# Patient Record
Sex: FEMALE | Race: WHITE | Employment: OTHER | URBAN - METROPOLITAN AREA
[De-identification: names, ages, dates, MRNs, and addresses within clinical notes are randomized per-mention and may not be internally consistent; named-entity substitution may affect disease eponyms.]

---

## 2017-01-12 ENCOUNTER — TELEPHONE (OUTPATIENT)
Dept: INTERNAL MEDICINE CLINIC | Age: 57
End: 2017-01-12

## 2017-01-12 DIAGNOSIS — G89.4 CHRONIC PAIN SYNDROME: ICD-10-CM

## 2017-01-12 RX ORDER — HYDROCODONE BITARTRATE AND ACETAMINOPHEN 10; 325 MG/1; MG/1
1 TABLET ORAL 3 TIMES DAILY PRN
Qty: 90 TABLET | Refills: 0 | Status: SHIPPED | OUTPATIENT
Start: 2017-01-12 | End: 2017-02-15 | Stop reason: SDUPTHER

## 2017-02-15 ENCOUNTER — OFFICE VISIT (OUTPATIENT)
Dept: INTERNAL MEDICINE CLINIC | Age: 57
End: 2017-02-15

## 2017-02-15 VITALS
BODY MASS INDEX: 23.84 KG/M2 | TEMPERATURE: 98.1 F | DIASTOLIC BLOOD PRESSURE: 74 MMHG | SYSTOLIC BLOOD PRESSURE: 118 MMHG | HEART RATE: 68 BPM | WEIGHT: 127.2 LBS | RESPIRATION RATE: 16 BRPM

## 2017-02-15 DIAGNOSIS — M51.16 LUMBAR DISC DISEASE WITH RADICULOPATHY: Primary | ICD-10-CM

## 2017-02-15 DIAGNOSIS — I10 ESSENTIAL HYPERTENSION: ICD-10-CM

## 2017-02-15 DIAGNOSIS — G89.4 CHRONIC PAIN SYNDROME: ICD-10-CM

## 2017-02-15 DIAGNOSIS — F17.200 SMOKER: ICD-10-CM

## 2017-02-15 PROCEDURE — 99213 OFFICE O/P EST LOW 20 MIN: CPT | Performed by: INTERNAL MEDICINE

## 2017-02-15 RX ORDER — HYDROCODONE BITARTRATE AND ACETAMINOPHEN 10; 325 MG/1; MG/1
1 TABLET ORAL 3 TIMES DAILY PRN
Qty: 90 TABLET | Refills: 0 | Status: SHIPPED | OUTPATIENT
Start: 2017-02-15 | End: 2017-03-14 | Stop reason: SDUPTHER

## 2017-02-18 ASSESSMENT — ENCOUNTER SYMPTOMS
SHORTNESS OF BREATH: 0
CONSTIPATION: 1
COUGH: 0
WHEEZING: 0
DIARRHEA: 0
CHEST TIGHTNESS: 0
VOMITING: 0
BACK PAIN: 1
ABDOMINAL PAIN: 0
NAUSEA: 0

## 2017-03-14 ENCOUNTER — TELEPHONE (OUTPATIENT)
Dept: INTERNAL MEDICINE CLINIC | Age: 57
End: 2017-03-14

## 2017-03-14 DIAGNOSIS — G89.4 CHRONIC PAIN SYNDROME: ICD-10-CM

## 2017-03-14 RX ORDER — HYDROCODONE BITARTRATE AND ACETAMINOPHEN 10; 325 MG/1; MG/1
1 TABLET ORAL 3 TIMES DAILY PRN
Qty: 90 TABLET | Refills: 0 | Status: SHIPPED | OUTPATIENT
Start: 2017-03-14 | End: 2017-04-13 | Stop reason: SDUPTHER

## 2017-04-13 DIAGNOSIS — G89.4 CHRONIC PAIN SYNDROME: ICD-10-CM

## 2017-04-13 RX ORDER — HYDROCODONE BITARTRATE AND ACETAMINOPHEN 10; 325 MG/1; MG/1
1 TABLET ORAL 3 TIMES DAILY PRN
Qty: 90 TABLET | Refills: 0 | Status: SHIPPED | OUTPATIENT
Start: 2017-04-13 | End: 2017-05-15 | Stop reason: SDUPTHER

## 2017-05-15 ENCOUNTER — OFFICE VISIT (OUTPATIENT)
Dept: INTERNAL MEDICINE CLINIC | Age: 57
End: 2017-05-15

## 2017-05-15 VITALS
SYSTOLIC BLOOD PRESSURE: 154 MMHG | BODY MASS INDEX: 24.08 KG/M2 | TEMPERATURE: 97.7 F | DIASTOLIC BLOOD PRESSURE: 84 MMHG | WEIGHT: 128.5 LBS | HEART RATE: 88 BPM

## 2017-05-15 DIAGNOSIS — G89.4 CHRONIC PAIN SYNDROME: ICD-10-CM

## 2017-05-15 DIAGNOSIS — N39.0 URINARY TRACT INFECTION, SITE UNSPECIFIED: ICD-10-CM

## 2017-05-15 DIAGNOSIS — I10 ESSENTIAL HYPERTENSION: ICD-10-CM

## 2017-05-15 DIAGNOSIS — D22.9 ATYPICAL MOLE: ICD-10-CM

## 2017-05-15 DIAGNOSIS — F17.200 SMOKER: ICD-10-CM

## 2017-05-15 DIAGNOSIS — N20.0 KIDNEY STONE: Primary | ICD-10-CM

## 2017-05-15 LAB
BILIRUBIN, POC: NORMAL
BLOOD URINE, POC: NORMAL
CLARITY, POC: CLEAR
COLOR, POC: NORMAL
GLUCOSE URINE, POC: NORMAL
KETONES, POC: NORMAL
LEUKOCYTE EST, POC: NORMAL
NITRITE, POC: NORMAL
PH, POC: 7
PROTEIN, POC: NORMAL
SPECIFIC GRAVITY, POC: 1.01
UROBILINOGEN, POC: 0.2

## 2017-05-15 PROCEDURE — 3014F SCREEN MAMMO DOC REV: CPT | Performed by: INTERNAL MEDICINE

## 2017-05-15 PROCEDURE — G8427 DOCREV CUR MEDS BY ELIG CLIN: HCPCS | Performed by: INTERNAL MEDICINE

## 2017-05-15 PROCEDURE — 81002 URINALYSIS NONAUTO W/O SCOPE: CPT | Performed by: INTERNAL MEDICINE

## 2017-05-15 PROCEDURE — 3017F COLORECTAL CA SCREEN DOC REV: CPT | Performed by: INTERNAL MEDICINE

## 2017-05-15 PROCEDURE — G8420 CALC BMI NORM PARAMETERS: HCPCS | Performed by: INTERNAL MEDICINE

## 2017-05-15 PROCEDURE — 99214 OFFICE O/P EST MOD 30 MIN: CPT | Performed by: INTERNAL MEDICINE

## 2017-05-15 PROCEDURE — 4004F PT TOBACCO SCREEN RCVD TLK: CPT | Performed by: INTERNAL MEDICINE

## 2017-05-15 RX ORDER — VARENICLINE TARTRATE 25 MG
KIT ORAL
Qty: 1 EACH | Refills: 0 | Status: SHIPPED | OUTPATIENT
Start: 2017-05-15 | End: 2017-09-11 | Stop reason: SINTOL

## 2017-05-15 RX ORDER — CEFUROXIME AXETIL 250 MG/1
250 TABLET ORAL 2 TIMES DAILY WITH MEALS
Qty: 14 TABLET | Refills: 0 | Status: SHIPPED | OUTPATIENT
Start: 2017-05-15 | End: 2017-08-03 | Stop reason: ALTCHOICE

## 2017-05-15 RX ORDER — LISINOPRIL 20 MG/1
20 TABLET ORAL DAILY
Qty: 30 TABLET | Refills: 0
Start: 2017-05-15 | End: 2017-08-03 | Stop reason: DRUGHIGH

## 2017-05-15 RX ORDER — HYDROCODONE BITARTRATE AND ACETAMINOPHEN 10; 325 MG/1; MG/1
1 TABLET ORAL 3 TIMES DAILY PRN
Qty: 90 TABLET | Refills: 0 | Status: SHIPPED | OUTPATIENT
Start: 2017-05-15 | End: 2017-06-14 | Stop reason: SDUPTHER

## 2017-05-16 ENCOUNTER — TELEPHONE (OUTPATIENT)
Dept: INTERNAL MEDICINE CLINIC | Age: 57
End: 2017-05-16

## 2017-05-16 DIAGNOSIS — N20.0 KIDNEY STONE: Primary | ICD-10-CM

## 2017-05-17 LAB — URINE CULTURE, ROUTINE: NORMAL

## 2017-05-19 ASSESSMENT — ENCOUNTER SYMPTOMS
BACK PAIN: 1
NAUSEA: 0
SHORTNESS OF BREATH: 0
CONSTIPATION: 1
DIARRHEA: 0
VOMITING: 0
WHEEZING: 0
COUGH: 0
ABDOMINAL PAIN: 0
CHEST TIGHTNESS: 0

## 2017-06-14 ENCOUNTER — TELEPHONE (OUTPATIENT)
Dept: INTERNAL MEDICINE CLINIC | Age: 57
End: 2017-06-14

## 2017-06-14 DIAGNOSIS — G89.4 CHRONIC PAIN SYNDROME: ICD-10-CM

## 2017-06-14 RX ORDER — HYDROCODONE BITARTRATE AND ACETAMINOPHEN 10; 325 MG/1; MG/1
1 TABLET ORAL 3 TIMES DAILY PRN
Qty: 90 TABLET | Refills: 0 | Status: SHIPPED | OUTPATIENT
Start: 2017-06-14 | End: 2017-07-13 | Stop reason: SDUPTHER

## 2017-06-22 ENCOUNTER — TELEPHONE (OUTPATIENT)
Dept: INTERNAL MEDICINE CLINIC | Age: 57
End: 2017-06-22

## 2017-06-22 DIAGNOSIS — N30.00 ACUTE CYSTITIS WITHOUT HEMATURIA: Primary | ICD-10-CM

## 2017-07-11 DIAGNOSIS — G89.4 CHRONIC PAIN SYNDROME: ICD-10-CM

## 2017-07-13 RX ORDER — HYDROCODONE BITARTRATE AND ACETAMINOPHEN 10; 325 MG/1; MG/1
1 TABLET ORAL 3 TIMES DAILY PRN
Qty: 90 TABLET | Refills: 0 | Status: SHIPPED | OUTPATIENT
Start: 2017-07-13 | End: 2017-08-03 | Stop reason: SDUPTHER

## 2017-08-03 ENCOUNTER — OFFICE VISIT (OUTPATIENT)
Dept: INTERNAL MEDICINE CLINIC | Age: 57
End: 2017-08-03

## 2017-08-03 VITALS
TEMPERATURE: 97.8 F | WEIGHT: 126.6 LBS | DIASTOLIC BLOOD PRESSURE: 88 MMHG | BODY MASS INDEX: 23.9 KG/M2 | HEIGHT: 61 IN | SYSTOLIC BLOOD PRESSURE: 150 MMHG | HEART RATE: 87 BPM

## 2017-08-03 DIAGNOSIS — M54.31 SCIATICA OF RIGHT SIDE: ICD-10-CM

## 2017-08-03 DIAGNOSIS — G89.4 CHRONIC PAIN SYNDROME: ICD-10-CM

## 2017-08-03 DIAGNOSIS — I10 ESSENTIAL HYPERTENSION: Primary | ICD-10-CM

## 2017-08-03 DIAGNOSIS — J44.9 CHRONIC OBSTRUCTIVE PULMONARY DISEASE, UNSPECIFIED COPD TYPE (HCC): ICD-10-CM

## 2017-08-03 DIAGNOSIS — F17.219 NICOTINE DEPENDENCE, CIGARETTES, WITH UNSPECIFIED NICOTINE-INDUCED DISORDERS: ICD-10-CM

## 2017-08-03 DIAGNOSIS — F41.9 ANXIETY: ICD-10-CM

## 2017-08-03 PROCEDURE — G8420 CALC BMI NORM PARAMETERS: HCPCS | Performed by: INTERNAL MEDICINE

## 2017-08-03 PROCEDURE — 3017F COLORECTAL CA SCREEN DOC REV: CPT | Performed by: INTERNAL MEDICINE

## 2017-08-03 PROCEDURE — G0296 VISIT TO DETERM LDCT ELIG: HCPCS | Performed by: INTERNAL MEDICINE

## 2017-08-03 PROCEDURE — 3014F SCREEN MAMMO DOC REV: CPT | Performed by: INTERNAL MEDICINE

## 2017-08-03 PROCEDURE — 3023F SPIROM DOC REV: CPT | Performed by: INTERNAL MEDICINE

## 2017-08-03 PROCEDURE — G8427 DOCREV CUR MEDS BY ELIG CLIN: HCPCS | Performed by: INTERNAL MEDICINE

## 2017-08-03 PROCEDURE — 99214 OFFICE O/P EST MOD 30 MIN: CPT | Performed by: INTERNAL MEDICINE

## 2017-08-03 PROCEDURE — G8926 SPIRO NO PERF OR DOC: HCPCS | Performed by: INTERNAL MEDICINE

## 2017-08-03 PROCEDURE — 4004F PT TOBACCO SCREEN RCVD TLK: CPT | Performed by: INTERNAL MEDICINE

## 2017-08-03 RX ORDER — LISINOPRIL 40 MG/1
40 TABLET ORAL DAILY
Qty: 30 TABLET | Refills: 3 | Status: SHIPPED | OUTPATIENT
Start: 2017-08-03 | End: 2017-12-11 | Stop reason: SDUPTHER

## 2017-08-03 RX ORDER — HYDROCODONE BITARTRATE AND ACETAMINOPHEN 10; 325 MG/1; MG/1
1 TABLET ORAL 3 TIMES DAILY PRN
Qty: 90 TABLET | Refills: 0 | Status: SHIPPED | OUTPATIENT
Start: 2017-08-03 | End: 2017-09-11 | Stop reason: SDUPTHER

## 2017-08-06 ASSESSMENT — ENCOUNTER SYMPTOMS
NAUSEA: 0
ABDOMINAL PAIN: 0
WHEEZING: 0
VOMITING: 0
COUGH: 1
DIARRHEA: 0
CONSTIPATION: 1
BACK PAIN: 1
CHEST TIGHTNESS: 0
SHORTNESS OF BREATH: 1

## 2017-09-11 ENCOUNTER — OFFICE VISIT (OUTPATIENT)
Dept: INTERNAL MEDICINE CLINIC | Age: 57
End: 2017-09-11

## 2017-09-11 VITALS
HEART RATE: 72 BPM | TEMPERATURE: 98.1 F | WEIGHT: 124.4 LBS | BODY MASS INDEX: 23.49 KG/M2 | SYSTOLIC BLOOD PRESSURE: 108 MMHG | HEIGHT: 61 IN | RESPIRATION RATE: 16 BRPM | DIASTOLIC BLOOD PRESSURE: 64 MMHG

## 2017-09-11 DIAGNOSIS — I10 ESSENTIAL HYPERTENSION: ICD-10-CM

## 2017-09-11 DIAGNOSIS — M54.31 SCIATICA OF RIGHT SIDE: ICD-10-CM

## 2017-09-11 DIAGNOSIS — Z12.4 SCREENING FOR CERVICAL CANCER: ICD-10-CM

## 2017-09-11 DIAGNOSIS — M25.551 RIGHT HIP PAIN: ICD-10-CM

## 2017-09-11 DIAGNOSIS — F17.200 SMOKER: ICD-10-CM

## 2017-09-11 DIAGNOSIS — F33.40 RECURRENT MAJOR DEPRESSIVE DISORDER, IN REMISSION (HCC): ICD-10-CM

## 2017-09-11 DIAGNOSIS — G89.4 CHRONIC PAIN SYNDROME: ICD-10-CM

## 2017-09-11 DIAGNOSIS — Z00.00 PHYSICAL EXAM, ANNUAL: Primary | ICD-10-CM

## 2017-09-11 PROCEDURE — 99396 PREV VISIT EST AGE 40-64: CPT | Performed by: INTERNAL MEDICINE

## 2017-09-11 RX ORDER — HYDROCODONE BITARTRATE AND ACETAMINOPHEN 10; 325 MG/1; MG/1
1 TABLET ORAL 3 TIMES DAILY PRN
Qty: 90 TABLET | Refills: 0 | Status: SHIPPED | OUTPATIENT
Start: 2017-09-11 | End: 2017-10-12 | Stop reason: SDUPTHER

## 2017-09-12 ASSESSMENT — ENCOUNTER SYMPTOMS
NAUSEA: 0
VOMITING: 0
COUGH: 0
CHEST TIGHTNESS: 0
TROUBLE SWALLOWING: 0
SHORTNESS OF BREATH: 0
ABDOMINAL PAIN: 0
SORE THROAT: 0
CONSTIPATION: 0
BACK PAIN: 1
DIARRHEA: 0

## 2017-09-13 RX ORDER — METRONIDAZOLE 500 MG/1
500 TABLET ORAL 3 TIMES DAILY
Qty: 30 TABLET | Refills: 0 | Status: SHIPPED | OUTPATIENT
Start: 2017-09-13 | End: 2017-09-19 | Stop reason: SINTOL

## 2017-09-15 LAB
CHOLESTEROL, TOTAL: 192 MG/DL
CHOLESTEROL, TOTAL: 192 MG/DL
CHOLESTEROL/HDL RATIO: 4
CHOLESTEROL/HDL RATIO: 4
HDLC SERPL-MCNC: 44 MG/DL (ref 35–70)
HDLC SERPL-MCNC: 44 MG/DL (ref 35–70)
LDL CHOLESTEROL CALCULATED: 122 MG/DL (ref 0–160)
LDL CHOLESTEROL CALCULATED: 122 MG/DL (ref 0–160)
TRIGL SERPL-MCNC: 126 MG/DL
TRIGL SERPL-MCNC: 126 MG/DL
VLDLC SERPL CALC-MCNC: 25 MG/DL
VLDLC SERPL CALC-MCNC: 25 MG/DL

## 2017-09-19 ENCOUNTER — TELEPHONE (OUTPATIENT)
Dept: INTERNAL MEDICINE CLINIC | Age: 57
End: 2017-09-19

## 2017-09-19 RX ORDER — CLINDAMYCIN HYDROCHLORIDE 300 MG/1
300 CAPSULE ORAL 2 TIMES DAILY
Qty: 14 CAPSULE | Refills: 0 | Status: SHIPPED | OUTPATIENT
Start: 2017-09-19 | End: 2017-09-26

## 2017-09-22 ENCOUNTER — TELEPHONE (OUTPATIENT)
Dept: INTERNAL MEDICINE CLINIC | Age: 57
End: 2017-09-22

## 2017-09-22 DIAGNOSIS — N30.00 ACUTE CYSTITIS WITHOUT HEMATURIA: Primary | ICD-10-CM

## 2017-09-22 RX ORDER — NITROFURANTOIN 25; 75 MG/1; MG/1
100 CAPSULE ORAL 2 TIMES DAILY
Qty: 14 CAPSULE | Refills: 0 | Status: SHIPPED | OUTPATIENT
Start: 2017-09-22 | End: 2017-09-29

## 2017-10-12 ENCOUNTER — TELEPHONE (OUTPATIENT)
Dept: INTERNAL MEDICINE CLINIC | Age: 57
End: 2017-10-12

## 2017-10-12 DIAGNOSIS — G89.4 CHRONIC PAIN SYNDROME: ICD-10-CM

## 2017-10-12 RX ORDER — HYDROCODONE BITARTRATE AND ACETAMINOPHEN 10; 325 MG/1; MG/1
1 TABLET ORAL 3 TIMES DAILY PRN
Qty: 90 TABLET | Refills: 0 | Status: SHIPPED | OUTPATIENT
Start: 2017-10-12 | End: 2017-11-27 | Stop reason: SDUPTHER

## 2017-11-27 ENCOUNTER — TELEPHONE (OUTPATIENT)
Dept: INTERNAL MEDICINE CLINIC | Age: 57
End: 2017-11-27

## 2017-11-27 DIAGNOSIS — G89.4 CHRONIC PAIN SYNDROME: ICD-10-CM

## 2017-11-27 RX ORDER — HYDROCODONE BITARTRATE AND ACETAMINOPHEN 10; 325 MG/1; MG/1
1 TABLET ORAL 3 TIMES DAILY PRN
Qty: 90 TABLET | Refills: 0 | Status: SHIPPED | OUTPATIENT
Start: 2017-11-27 | End: 2018-01-03 | Stop reason: SDUPTHER

## 2017-12-11 ENCOUNTER — OFFICE VISIT (OUTPATIENT)
Dept: INTERNAL MEDICINE CLINIC | Age: 57
End: 2017-12-11

## 2017-12-11 VITALS
WEIGHT: 126 LBS | SYSTOLIC BLOOD PRESSURE: 110 MMHG | HEIGHT: 61 IN | HEART RATE: 92 BPM | DIASTOLIC BLOOD PRESSURE: 70 MMHG | TEMPERATURE: 98.1 F | BODY MASS INDEX: 23.79 KG/M2 | OXYGEN SATURATION: 99 %

## 2017-12-11 DIAGNOSIS — R90.82 WHITE MATTER DISEASE: ICD-10-CM

## 2017-12-11 DIAGNOSIS — F17.200 SMOKER: ICD-10-CM

## 2017-12-11 DIAGNOSIS — G89.4 CHRONIC PAIN SYNDROME: ICD-10-CM

## 2017-12-11 DIAGNOSIS — F41.9 ANXIETY: ICD-10-CM

## 2017-12-11 DIAGNOSIS — R25.1 OCCASIONAL TREMORS: ICD-10-CM

## 2017-12-11 DIAGNOSIS — I10 ESSENTIAL HYPERTENSION: ICD-10-CM

## 2017-12-11 DIAGNOSIS — M54.31 SCIATICA OF RIGHT SIDE: ICD-10-CM

## 2017-12-11 DIAGNOSIS — H53.9 VISION CHANGES: Primary | ICD-10-CM

## 2017-12-11 DIAGNOSIS — L98.9 LESION OF FINGER: ICD-10-CM

## 2017-12-11 PROCEDURE — G8420 CALC BMI NORM PARAMETERS: HCPCS | Performed by: INTERNAL MEDICINE

## 2017-12-11 PROCEDURE — G8484 FLU IMMUNIZE NO ADMIN: HCPCS | Performed by: INTERNAL MEDICINE

## 2017-12-11 PROCEDURE — 99214 OFFICE O/P EST MOD 30 MIN: CPT | Performed by: INTERNAL MEDICINE

## 2017-12-11 PROCEDURE — G8427 DOCREV CUR MEDS BY ELIG CLIN: HCPCS | Performed by: INTERNAL MEDICINE

## 2017-12-11 PROCEDURE — 4004F PT TOBACCO SCREEN RCVD TLK: CPT | Performed by: INTERNAL MEDICINE

## 2017-12-11 PROCEDURE — 3014F SCREEN MAMMO DOC REV: CPT | Performed by: INTERNAL MEDICINE

## 2017-12-11 PROCEDURE — 3017F COLORECTAL CA SCREEN DOC REV: CPT | Performed by: INTERNAL MEDICINE

## 2017-12-11 RX ORDER — NICOTINE 21 MG/24HR
1 PATCH, TRANSDERMAL 24 HOURS TRANSDERMAL EVERY 24 HOURS
Qty: 30 PATCH | Refills: 0 | Status: SHIPPED | OUTPATIENT
Start: 2017-12-11 | End: 2018-06-13

## 2017-12-11 RX ORDER — LISINOPRIL 40 MG/1
40 TABLET ORAL DAILY
Qty: 90 TABLET | Refills: 1 | Status: SHIPPED | OUTPATIENT
Start: 2017-12-11 | End: 2018-08-01 | Stop reason: SDUPTHER

## 2017-12-11 RX ORDER — POLYETHYLENE GLYCOL 3350 17 G
2 POWDER IN PACKET (EA) ORAL EVERY 4 HOURS PRN
Qty: 100 EACH | Refills: 3 | Status: SHIPPED | OUTPATIENT
Start: 2017-12-11 | End: 2018-06-13

## 2017-12-11 ASSESSMENT — ENCOUNTER SYMPTOMS: BACK PAIN: 1

## 2017-12-11 NOTE — PATIENT INSTRUCTIONS
Please call your pharmacy if you need any refills of your medication(s). Please call our office at [phone] if you don't hear from us about your test results. Bring an accurate list of your medications with you at every appointment to ensure that we have the correct information.     Our office hours are: Monday - Friday 8:30 am- 5 pm    Phone lines turn on at 8:30 am

## 2017-12-11 NOTE — PROGRESS NOTES
SUBJECTIVE:  Sergio Quinonez is a 65 y.o. female being evaluated for:    Chief Complaint   Patient presents with    3 Month Follow-Up     medication refill, HTN, Chronic pain, has been having some visional problems concerned about whit brain matter       HPI   Patient having visual disturbances again. Described as looking through a fog. MRI in past with white matter changes. MIld jerking      Index finger on right hand with lesion there for 3 weeks  Started after deer hunting. Sore using neosporin. Will not heal  Arthritic in the area      Chronic pan right low back into groin. Numbness in vaginal area worseining. TO have surgery but unable to stop smoking     Depression doing better     Allergies   Allergen Reactions    Avelox [Moxifloxacin] Swelling and Rash    Chantix [Varenicline]      irritability and worsening depression     Codeine Nausea Only    Sulfa Antibiotics Swelling and Rash     Current Outpatient Prescriptions   Medication Sig Dispense Refill    nicotine (NICODERM CQ) 21 MG/24HR Place 1 patch onto the skin every 24 hours 30 patch 0    nicotine polacrilex (COMMIT) 2 MG lozenge Take 1 lozenge by mouth every 4 hours as needed for Smoking cessation 100 each 3    sertraline (ZOLOFT) 50 MG tablet Take 1 tablet by mouth daily 90 tablet 1    lisinopril (PRINIVIL;ZESTRIL) 40 MG tablet Take 1 tablet by mouth daily 90 tablet 1    HYDROcodone-acetaminophen (NORCO)  MG per tablet Take 1 tablet by mouth 3 times daily as needed for Pain . 90 tablet 0    umeclidinium-vilanterol (ANORO ELLIPTA) 62.5-25 MCG/INH AEPB inhaler Inhale 1 puff into the lungs daily 1 each 5     No current facility-administered medications for this visit. Social History     Social History    Marital status:      Spouse name: N/A    Number of children: N/A    Years of education: N/A     Occupational History    Not on file.      Social History Main Topics    Smoking status: Current Every Day Smoker Packs/day: 1.00     Years: 34.00     Types: Cigarettes    Smokeless tobacco: Never Used    Alcohol use No    Drug use: No    Sexual activity: Not on file     Other Topics Concern    Not on file     Social History Narrative    No narrative on file      Past Medical History:   Diagnosis Date    Anxiety     Chronic back pain     Chronic pain     Depression     GERD (gastroesophageal reflux disease)     Lumbar disc disease     Migraine     Osteoarthritis     Sciatica of right side     Tobacco abuse     Unspecified essential hypertension      Past Surgical History:   Procedure Laterality Date    APPENDECTOMY      KNEE SURGERY Left     SHOULDER SURGERY Left        Review of Systems   Constitutional: Positive for activity change. Negative for fatigue. HENT: Negative for nosebleeds and sore throat. Eyes: Positive for visual disturbance. Respiratory: Negative for cough and shortness of breath. Cardiovascular: Negative for chest pain, palpitations and leg swelling. Gastrointestinal: Negative for abdominal pain, constipation, diarrhea, nausea and vomiting. Genitourinary: Negative for dysuria, vaginal discharge and vaginal pain. Musculoskeletal: Positive for arthralgias (hip ), back pain and gait problem. Skin:        Nodule on distal PIP joint index finger right hand   Neurological: Positive for tremors. Negative for dizziness, light-headedness and headaches. Paraesthesias down right leg    Psychiatric/Behavioral: Positive for sleep disturbance. Negative for dysphoric mood and suicidal ideas. The patient is nervous/anxious. OBJECTIVE:  /70 (Site: Left Arm, Position: Sitting, Cuff Size: Medium Adult)   Pulse 92   Temp 98.1 °F (36.7 °C) (Oral)   Ht 5' 1.25\" (1.556 m)   Wt 126 lb (57.2 kg)   LMP  (Approximate)   SpO2 99%   BMI 23.61 kg/m²      Body mass index is 23.61 kg/m².      Physical Exam    ASSESSMENT/PLAN:    Apurva Zaidi was seen today for 3 month Bring an accurate list of your medications with you at every appointment to ensure that we have the correct information.     Our office hours are: Monday - Friday 8:30 am- 5 pm    Phone lines turn on at 8:30 am

## 2017-12-13 ASSESSMENT — ENCOUNTER SYMPTOMS
NAUSEA: 0
VOMITING: 0
DIARRHEA: 0
COUGH: 0
CONSTIPATION: 0
ABDOMINAL PAIN: 0
SORE THROAT: 0
SHORTNESS OF BREATH: 0

## 2018-01-03 ENCOUNTER — OFFICE VISIT (OUTPATIENT)
Dept: INTERNAL MEDICINE CLINIC | Age: 58
End: 2018-01-03

## 2018-01-03 VITALS
OXYGEN SATURATION: 93 % | HEART RATE: 80 BPM | SYSTOLIC BLOOD PRESSURE: 112 MMHG | WEIGHT: 128 LBS | TEMPERATURE: 97.9 F | DIASTOLIC BLOOD PRESSURE: 70 MMHG | BODY MASS INDEX: 24.17 KG/M2 | HEIGHT: 61 IN

## 2018-01-03 DIAGNOSIS — G89.4 CHRONIC PAIN SYNDROME: ICD-10-CM

## 2018-01-03 DIAGNOSIS — J01.10 ACUTE NON-RECURRENT FRONTAL SINUSITIS: Primary | ICD-10-CM

## 2018-01-03 PROCEDURE — G8427 DOCREV CUR MEDS BY ELIG CLIN: HCPCS | Performed by: INTERNAL MEDICINE

## 2018-01-03 PROCEDURE — 4004F PT TOBACCO SCREEN RCVD TLK: CPT | Performed by: INTERNAL MEDICINE

## 2018-01-03 PROCEDURE — G8420 CALC BMI NORM PARAMETERS: HCPCS | Performed by: INTERNAL MEDICINE

## 2018-01-03 PROCEDURE — 3014F SCREEN MAMMO DOC REV: CPT | Performed by: INTERNAL MEDICINE

## 2018-01-03 PROCEDURE — 99213 OFFICE O/P EST LOW 20 MIN: CPT | Performed by: INTERNAL MEDICINE

## 2018-01-03 PROCEDURE — 3017F COLORECTAL CA SCREEN DOC REV: CPT | Performed by: INTERNAL MEDICINE

## 2018-01-03 PROCEDURE — G8484 FLU IMMUNIZE NO ADMIN: HCPCS | Performed by: INTERNAL MEDICINE

## 2018-01-03 RX ORDER — PREDNISONE 20 MG/1
20 TABLET ORAL DAILY
Qty: 10 TABLET | Refills: 0 | Status: SHIPPED | OUTPATIENT
Start: 2018-01-03 | End: 2018-01-13

## 2018-01-03 RX ORDER — AMOXICILLIN AND CLAVULANATE POTASSIUM 875; 125 MG/1; MG/1
1 TABLET, FILM COATED ORAL 2 TIMES DAILY
Qty: 20 TABLET | Refills: 0 | Status: SHIPPED | OUTPATIENT
Start: 2018-01-03 | End: 2018-01-13

## 2018-01-03 RX ORDER — HYDROCODONE BITARTRATE AND ACETAMINOPHEN 10; 325 MG/1; MG/1
1 TABLET ORAL 3 TIMES DAILY PRN
Qty: 90 TABLET | Refills: 0 | Status: SHIPPED | OUTPATIENT
Start: 2018-01-03 | End: 2018-02-12 | Stop reason: SDUPTHER

## 2018-01-03 NOTE — PROGRESS NOTES
SUBJECTIVE:  Shad Nicholas is a 65 y.o. female being evaluated for:    Chief Complaint   Patient presents with    Sinusitis     feeling bad for 3 weeks and getting worse, head and chest congestion    Cough       HPI   Sick for 3 weeks. Head congestion. Nasal drainage is green. Terrible headache between and above her eyes. No ear pain. DIzzy with blowing her nose. COuhging Not productive but hears rattling  No wheezing  No SOB  No fevers or chills.  with similar symptoms Smoking 1/2 pack a day. Needs to stop smoking to have spine surgeyr   Allergies   Allergen Reactions    Avelox [Moxifloxacin] Swelling and Rash    Chantix [Varenicline]      irritability and worsening depression     Codeine Nausea Only    Sulfa Antibiotics Swelling and Rash     Current Outpatient Prescriptions   Medication Sig Dispense Refill    nicotine (NICODERM CQ) 21 MG/24HR Place 1 patch onto the skin every 24 hours 30 patch 0    nicotine polacrilex (COMMIT) 2 MG lozenge Take 1 lozenge by mouth every 4 hours as needed for Smoking cessation 100 each 3    sertraline (ZOLOFT) 50 MG tablet Take 1 tablet by mouth daily 90 tablet 1    lisinopril (PRINIVIL;ZESTRIL) 40 MG tablet Take 1 tablet by mouth daily 90 tablet 1    HYDROcodone-acetaminophen (NORCO)  MG per tablet Take 1 tablet by mouth 3 times daily as needed for Pain . 90 tablet 0    umeclidinium-vilanterol (ANORO ELLIPTA) 62.5-25 MCG/INH AEPB inhaler Inhale 1 puff into the lungs daily 1 each 5     No current facility-administered medications for this visit. Social History     Social History    Marital status:      Spouse name: N/A    Number of children: N/A    Years of education: N/A     Occupational History    Not on file.      Social History Main Topics    Smoking status: Current Every Day Smoker     Packs/day: 1.00     Years: 34.00     Types: Cigarettes    Smokeless tobacco: Never Used    Alcohol use No    Drug use: No    Sexual activity: Not on file     Other Topics Concern    Not on file     Social History Narrative    No narrative on file      Past Medical History:   Diagnosis Date    Anxiety     Chronic back pain     Chronic pain     Depression     GERD (gastroesophageal reflux disease)     Lumbar disc disease     Migraine     Osteoarthritis     Sciatica of right side     Tobacco abuse     Unspecified essential hypertension      Past Surgical History:   Procedure Laterality Date    APPENDECTOMY      KNEE SURGERY Left     SHOULDER SURGERY Left        Review of Systems    OBJECTIVE:  /70   Pulse 80   Temp 97.9 °F (36.6 °C) (Oral)   Ht 5' 1.25\" (1.556 m)   Wt 128 lb (58.1 kg)   LMP  (Approximate)   SpO2 93%   BMI 23.99 kg/m²      Body mass index is 23.99 kg/m². Physical Exam    ASSESSMENT/PLAN:    There are no diagnoses linked to this encounter. No orders of the defined types were placed in this encounter. No Follow-up on file. Patient Instructions   Please call your pharmacy if you need any refills of your medication(s). Please call our office at [phone] if you don't hear from us about your test results. Bring an accurate list of your medications with you at every appointment to ensure that we have the correct information.     Our office hours are: Monday - Friday 8:30 am- 5 pm    Phone lines turn on at 8:30 am

## 2018-01-16 ENCOUNTER — TELEPHONE (OUTPATIENT)
Dept: INTERNAL MEDICINE CLINIC | Age: 58
End: 2018-01-16

## 2018-01-16 RX ORDER — AMOXICILLIN 875 MG/1
875 TABLET, COATED ORAL 2 TIMES DAILY
Qty: 20 TABLET | Refills: 0 | Status: SHIPPED | OUTPATIENT
Start: 2018-01-16 | End: 2018-01-18 | Stop reason: SDUPTHER

## 2018-01-16 RX ORDER — METHYLPREDNISOLONE 4 MG/1
TABLET ORAL
Qty: 1 KIT | Refills: 0 | Status: SHIPPED | OUTPATIENT
Start: 2018-01-16 | End: 2018-01-18 | Stop reason: SDUPTHER

## 2018-01-18 ENCOUNTER — TELEPHONE (OUTPATIENT)
Dept: INTERNAL MEDICINE CLINIC | Age: 58
End: 2018-01-18

## 2018-01-18 RX ORDER — METHYLPREDNISOLONE 4 MG/1
TABLET ORAL
Qty: 1 KIT | Refills: 0 | Status: SHIPPED | OUTPATIENT
Start: 2018-01-18 | End: 2018-01-24

## 2018-01-18 RX ORDER — AMOXICILLIN 875 MG/1
875 TABLET, COATED ORAL 2 TIMES DAILY
Qty: 20 TABLET | Refills: 0 | Status: SHIPPED | OUTPATIENT
Start: 2018-01-18 | End: 2018-03-13

## 2018-02-12 ENCOUNTER — TELEPHONE (OUTPATIENT)
Dept: INTERNAL MEDICINE CLINIC | Age: 58
End: 2018-02-12

## 2018-02-12 DIAGNOSIS — G89.4 CHRONIC PAIN SYNDROME: ICD-10-CM

## 2018-02-12 RX ORDER — HYDROCODONE BITARTRATE AND ACETAMINOPHEN 10; 325 MG/1; MG/1
1 TABLET ORAL 3 TIMES DAILY PRN
Qty: 90 TABLET | Refills: 0 | Status: SHIPPED | OUTPATIENT
Start: 2018-02-12 | End: 2018-03-13 | Stop reason: DRUGHIGH

## 2018-03-13 ENCOUNTER — OFFICE VISIT (OUTPATIENT)
Dept: INTERNAL MEDICINE CLINIC | Age: 58
End: 2018-03-13

## 2018-03-13 VITALS
WEIGHT: 129 LBS | HEART RATE: 90 BPM | HEIGHT: 61 IN | DIASTOLIC BLOOD PRESSURE: 70 MMHG | TEMPERATURE: 97.9 F | OXYGEN SATURATION: 96 % | BODY MASS INDEX: 24.35 KG/M2 | SYSTOLIC BLOOD PRESSURE: 110 MMHG

## 2018-03-13 DIAGNOSIS — J44.9 CHRONIC OBSTRUCTIVE PULMONARY DISEASE, UNSPECIFIED COPD TYPE (HCC): ICD-10-CM

## 2018-03-13 DIAGNOSIS — M51.16 LUMBAR DISC DISEASE WITH RADICULOPATHY: ICD-10-CM

## 2018-03-13 DIAGNOSIS — C43.71 MALIGNANT MELANOMA OF RIGHT LOWER EXTREMITY INCLUDING HIP (HCC): ICD-10-CM

## 2018-03-13 DIAGNOSIS — G89.4 CHRONIC PAIN SYNDROME: Primary | ICD-10-CM

## 2018-03-13 DIAGNOSIS — F17.200 SMOKER: ICD-10-CM

## 2018-03-13 DIAGNOSIS — A49.02 MRSA (METHICILLIN RESISTANT STAPHYLOCOCCUS AUREUS) INFECTION: ICD-10-CM

## 2018-03-13 DIAGNOSIS — I10 ESSENTIAL HYPERTENSION: ICD-10-CM

## 2018-03-13 PROCEDURE — 99214 OFFICE O/P EST MOD 30 MIN: CPT | Performed by: INTERNAL MEDICINE

## 2018-03-13 RX ORDER — ALBUTEROL SULFATE 90 UG/1
2 AEROSOL, METERED RESPIRATORY (INHALATION) EVERY 6 HOURS PRN
Qty: 1 INHALER | Refills: 3 | Status: SHIPPED | OUTPATIENT
Start: 2018-03-13 | End: 2020-01-28 | Stop reason: SDUPTHER

## 2018-03-13 RX ORDER — HYDROCODONE BITARTRATE AND ACETAMINOPHEN 7.5; 325 MG/1; MG/1
1 TABLET ORAL EVERY 6 HOURS PRN
Qty: 90 TABLET | Refills: 0 | Status: SHIPPED | OUTPATIENT
Start: 2018-03-13 | End: 2018-04-16 | Stop reason: SDUPTHER

## 2018-03-13 RX ORDER — BUDESONIDE AND FORMOTEROL FUMARATE DIHYDRATE 160; 4.5 UG/1; UG/1
2 AEROSOL RESPIRATORY (INHALATION) 2 TIMES DAILY
Qty: 1 INHALER | Refills: 5 | Status: SHIPPED | OUTPATIENT
Start: 2018-03-13 | End: 2019-05-09 | Stop reason: SDUPTHER

## 2018-03-13 ASSESSMENT — PATIENT HEALTH QUESTIONNAIRE - PHQ9
SUM OF ALL RESPONSES TO PHQ QUESTIONS 1-9: 0
2. FEELING DOWN, DEPRESSED OR HOPELESS: 0
1. LITTLE INTEREST OR PLEASURE IN DOING THINGS: 0
SUM OF ALL RESPONSES TO PHQ9 QUESTIONS 1 & 2: 0

## 2018-03-13 NOTE — PROGRESS NOTES
Years: 34.00     Types: Cigarettes    Smokeless tobacco: Never Used    Alcohol use No    Drug use: No    Sexual activity: Not on file     Other Topics Concern    Not on file     Social History Narrative    No narrative on file      Past Medical History:   Diagnosis Date    Anxiety     Chronic back pain     Chronic pain     Depression     GERD (gastroesophageal reflux disease)     Lumbar disc disease     Migraine     Osteoarthritis     Sciatica of right side     Tobacco abuse     Unspecified essential hypertension      Past Surgical History:   Procedure Laterality Date    APPENDECTOMY      KNEE SURGERY Left     SHOULDER SURGERY Left        Review of Systems   Constitutional: Negative for fatigue. HENT: Negative for nosebleeds. Eyes: Positive for visual disturbance. Respiratory: Negative for cough and shortness of breath. Cardiovascular: Negative for chest pain, palpitations and leg swelling. Gastrointestinal: Negative for abdominal pain, constipation, diarrhea, nausea and vomiting. Musculoskeletal: Positive for arthralgias (hip ), back pain and gait problem. Skin: Positive for wound. Neurological: Negative for dizziness, light-headedness and headaches. Paraesthesias down right leg    Psychiatric/Behavioral: Positive for sleep disturbance. Negative for dysphoric mood and suicidal ideas. OBJECTIVE:  /70 (Site: Left Arm, Position: Sitting, Cuff Size: Medium Adult)   Pulse 90   Temp 97.9 °F (36.6 °C) (Oral)   Ht 5' 1.25\" (1.556 m)   Wt 129 lb (58.5 kg)   LMP  (Approximate)   SpO2 96%   BMI 24.18 kg/m²      Body mass index is 24.18 kg/m². Physical Exam   Constitutional: She is oriented to person, place, and time. She appears well-developed and well-nourished. No distress. HENT:   Head: Normocephalic and atraumatic. Eyes: Conjunctivae are normal.   Neck: Neck supple. No thyromegaly present. Cardiovascular: Normal rate and regular rhythm.   Exam albuterol sulfate HFA (PROVENTIL HFA) 108 (90 Base) MCG/ACT inhaler     Sig: Inhale 2 puffs into the lungs every 6 hours as needed for Wheezing     Dispense:  1 Inhaler     Refill:  3    budesonide-formoterol (SYMBICORT) 160-4.5 MCG/ACT AERO     Sig: Inhale 2 puffs into the lungs 2 times daily Rinse mouth after use. Dispense:  1 Inhaler     Refill:  5        Return in about 3 months (around 6/13/2018), or if symptoms worsen or fail to improve. Patient Instructions   Please call your pharmacy if you need any refills of your medication(s). Please call our office at [phone] if you don't hear from us about your test results. Bring an accurate list of your medications with you at every appointment to ensure that we have the correct information.     Our office hours are: Monday - Friday 8:30 am- 5 pm    Phone lines turn on at 8:30 am

## 2018-03-17 ASSESSMENT — ENCOUNTER SYMPTOMS
DIARRHEA: 0
BACK PAIN: 1
VOMITING: 0
NAUSEA: 0
CONSTIPATION: 0
COUGH: 0
SHORTNESS OF BREATH: 0
ABDOMINAL PAIN: 0

## 2018-04-16 ENCOUNTER — TELEPHONE (OUTPATIENT)
Dept: INTERNAL MEDICINE CLINIC | Age: 58
End: 2018-04-16

## 2018-04-16 DIAGNOSIS — G89.4 CHRONIC PAIN SYNDROME: ICD-10-CM

## 2018-04-16 RX ORDER — HYDROCODONE BITARTRATE AND ACETAMINOPHEN 7.5; 325 MG/1; MG/1
1 TABLET ORAL EVERY 6 HOURS PRN
Qty: 90 TABLET | Refills: 0 | Status: SHIPPED | OUTPATIENT
Start: 2018-04-16 | End: 2018-05-15 | Stop reason: SDUPTHER

## 2018-05-15 ENCOUNTER — TELEPHONE (OUTPATIENT)
Dept: INTERNAL MEDICINE CLINIC | Age: 58
End: 2018-05-15

## 2018-05-15 DIAGNOSIS — G89.4 CHRONIC PAIN SYNDROME: ICD-10-CM

## 2018-05-15 RX ORDER — HYDROCODONE BITARTRATE AND ACETAMINOPHEN 7.5; 325 MG/1; MG/1
1 TABLET ORAL EVERY 6 HOURS PRN
Qty: 90 TABLET | Refills: 0 | Status: SHIPPED | OUTPATIENT
Start: 2018-05-15 | End: 2018-06-13 | Stop reason: SDUPTHER

## 2018-06-13 ENCOUNTER — OFFICE VISIT (OUTPATIENT)
Dept: INTERNAL MEDICINE CLINIC | Age: 58
End: 2018-06-13

## 2018-06-13 VITALS
RESPIRATION RATE: 16 BRPM | WEIGHT: 131.4 LBS | DIASTOLIC BLOOD PRESSURE: 76 MMHG | BODY MASS INDEX: 24.63 KG/M2 | SYSTOLIC BLOOD PRESSURE: 122 MMHG | TEMPERATURE: 98.4 F | HEART RATE: 75 BPM

## 2018-06-13 DIAGNOSIS — G89.4 CHRONIC PAIN SYNDROME: ICD-10-CM

## 2018-06-13 DIAGNOSIS — J44.1 COPD EXACERBATION (HCC): Primary | ICD-10-CM

## 2018-06-13 DIAGNOSIS — M54.31 SCIATICA OF RIGHT SIDE: ICD-10-CM

## 2018-06-13 DIAGNOSIS — I10 ESSENTIAL HYPERTENSION: ICD-10-CM

## 2018-06-13 DIAGNOSIS — R31.29 OTHER MICROSCOPIC HEMATURIA: ICD-10-CM

## 2018-06-13 DIAGNOSIS — R33.9 URINARY RETENTION: ICD-10-CM

## 2018-06-13 DIAGNOSIS — F41.9 ANXIETY: ICD-10-CM

## 2018-06-13 LAB
BILIRUBIN, POC: NORMAL
BLOOD URINE, POC: NORMAL
CLARITY, POC: CLEAR
COLOR, POC: YELLOW
GLUCOSE URINE, POC: NORMAL
KETONES, POC: NORMAL
LEUKOCYTE EST, POC: NORMAL
NITRITE, POC: NORMAL
PH, POC: 6
PROTEIN, POC: NORMAL
SPECIFIC GRAVITY, POC: 1.01
UROBILINOGEN, POC: NORMAL

## 2018-06-13 PROCEDURE — 99214 OFFICE O/P EST MOD 30 MIN: CPT | Performed by: INTERNAL MEDICINE

## 2018-06-13 PROCEDURE — 81002 URINALYSIS NONAUTO W/O SCOPE: CPT | Performed by: INTERNAL MEDICINE

## 2018-06-13 RX ORDER — SERTRALINE HYDROCHLORIDE 100 MG/1
100 TABLET, FILM COATED ORAL DAILY
Qty: 30 TABLET | Refills: 3 | Status: SHIPPED | OUTPATIENT
Start: 2018-06-13 | End: 2019-01-17 | Stop reason: SDUPTHER

## 2018-06-13 RX ORDER — PREDNISONE 20 MG/1
20 TABLET ORAL DAILY
Qty: 10 TABLET | Refills: 0 | Status: SHIPPED | OUTPATIENT
Start: 2018-06-13 | End: 2018-06-23

## 2018-06-13 RX ORDER — HYDROCODONE BITARTRATE AND ACETAMINOPHEN 7.5; 325 MG/1; MG/1
1 TABLET ORAL EVERY 8 HOURS PRN
Qty: 90 TABLET | Refills: 0 | Status: SHIPPED | OUTPATIENT
Start: 2018-06-13 | End: 2018-07-30 | Stop reason: SDUPTHER

## 2018-06-15 LAB — URINE CULTURE, ROUTINE: NORMAL

## 2018-06-17 ASSESSMENT — ENCOUNTER SYMPTOMS
COUGH: 1
BACK PAIN: 1
CONSTIPATION: 1
CHEST TIGHTNESS: 1
ABDOMINAL PAIN: 0
NAUSEA: 0
VOMITING: 0
SORE THROAT: 1
SHORTNESS OF BREATH: 1
DIARRHEA: 0
RHINORRHEA: 1
WHEEZING: 0

## 2018-06-28 ENCOUNTER — TELEPHONE (OUTPATIENT)
Dept: INTERNAL MEDICINE CLINIC | Age: 58
End: 2018-06-28

## 2018-06-28 RX ORDER — OMEPRAZOLE 20 MG/1
20 CAPSULE, DELAYED RELEASE ORAL
Qty: 30 CAPSULE | Refills: 3 | Status: SHIPPED | OUTPATIENT
Start: 2018-06-28 | End: 2018-11-05 | Stop reason: SDUPTHER

## 2018-07-16 ENCOUNTER — TELEPHONE (OUTPATIENT)
Dept: INTERNAL MEDICINE CLINIC | Age: 58
End: 2018-07-16

## 2018-07-30 ENCOUNTER — TELEPHONE (OUTPATIENT)
Dept: INTERNAL MEDICINE CLINIC | Age: 58
End: 2018-07-30

## 2018-07-30 DIAGNOSIS — G89.4 CHRONIC PAIN SYNDROME: ICD-10-CM

## 2018-07-30 RX ORDER — HYDROCODONE BITARTRATE AND ACETAMINOPHEN 7.5; 325 MG/1; MG/1
1 TABLET ORAL EVERY 8 HOURS PRN
Qty: 90 TABLET | Refills: 0 | Status: SHIPPED | OUTPATIENT
Start: 2018-07-30 | End: 2018-09-05 | Stop reason: SDUPTHER

## 2018-08-01 DIAGNOSIS — I10 ESSENTIAL HYPERTENSION: ICD-10-CM

## 2018-08-02 RX ORDER — LISINOPRIL 40 MG/1
40 TABLET ORAL DAILY
Qty: 90 TABLET | Refills: 0 | Status: SHIPPED | OUTPATIENT
Start: 2018-08-02 | End: 2018-11-06 | Stop reason: SDUPTHER

## 2018-09-05 ENCOUNTER — TELEPHONE (OUTPATIENT)
Dept: INTERNAL MEDICINE CLINIC | Age: 58
End: 2018-09-05

## 2018-09-05 DIAGNOSIS — G89.4 CHRONIC PAIN SYNDROME: ICD-10-CM

## 2018-09-05 RX ORDER — HYDROCODONE BITARTRATE AND ACETAMINOPHEN 7.5; 325 MG/1; MG/1
1 TABLET ORAL EVERY 8 HOURS PRN
Qty: 90 TABLET | Refills: 0 | Status: SHIPPED | OUTPATIENT
Start: 2018-09-05 | End: 2018-11-05 | Stop reason: SDUPTHER

## 2018-09-05 NOTE — TELEPHONE ENCOUNTER
Pt needs a new rx on HYDROcodone-acetaminophen (NORCO) 7.5-325 MG per tablet to be sent to [facility] in [place].

## 2018-10-17 ENCOUNTER — TELEPHONE (OUTPATIENT)
Dept: INTERNAL MEDICINE CLINIC | Age: 58
End: 2018-10-17

## 2018-10-17 DIAGNOSIS — R33.9 URINARY RETENTION: ICD-10-CM

## 2018-10-17 DIAGNOSIS — M51.16 LUMBAR DISC DISEASE WITH RADICULOPATHY: ICD-10-CM

## 2018-10-17 DIAGNOSIS — M54.31 SCIATICA OF RIGHT SIDE: ICD-10-CM

## 2018-10-17 DIAGNOSIS — G89.4 CHRONIC PAIN SYNDROME: Primary | ICD-10-CM

## 2018-10-17 NOTE — TELEPHONE ENCOUNTER
Patient states she will precert the injections herself.  We just need to fax the order to her at [phone]

## 2018-10-17 NOTE — TELEPHONE ENCOUNTER
Patient calling and would like Dr. Ray Rubio to order 3 Lesi injections for her back pain. She states she is due to have back surgery but needs something for pain till she can have surgery. She will have it done at [facility]. She states she will precert it herself .  Fax order to [phone]

## 2018-10-18 ENCOUNTER — TELEPHONE (OUTPATIENT)
Dept: INTERNAL MEDICINE CLINIC | Age: 58
End: 2018-10-18

## 2018-11-01 ENCOUNTER — TELEPHONE (OUTPATIENT)
Dept: INTERNAL MEDICINE CLINIC | Age: 58
End: 2018-11-01

## 2018-11-05 ENCOUNTER — OFFICE VISIT (OUTPATIENT)
Dept: INTERNAL MEDICINE CLINIC | Age: 58
End: 2018-11-05
Payer: COMMERCIAL

## 2018-11-05 VITALS
TEMPERATURE: 98.1 F | SYSTOLIC BLOOD PRESSURE: 100 MMHG | WEIGHT: 130.2 LBS | OXYGEN SATURATION: 98 % | RESPIRATION RATE: 16 BRPM | BODY MASS INDEX: 24.4 KG/M2 | DIASTOLIC BLOOD PRESSURE: 68 MMHG | HEART RATE: 68 BPM

## 2018-11-05 DIAGNOSIS — Z87.891 PERSONAL HISTORY OF TOBACCO USE: ICD-10-CM

## 2018-11-05 DIAGNOSIS — I10 ESSENTIAL HYPERTENSION: ICD-10-CM

## 2018-11-05 DIAGNOSIS — C44.90 SKIN CANCER: ICD-10-CM

## 2018-11-05 DIAGNOSIS — M54.31 SCIATICA, RIGHT SIDE: ICD-10-CM

## 2018-11-05 DIAGNOSIS — H53.9 VISION CHANGES: ICD-10-CM

## 2018-11-05 DIAGNOSIS — G89.4 CHRONIC PAIN SYNDROME: ICD-10-CM

## 2018-11-05 DIAGNOSIS — G93.9 CHRONIC NON-SPECIFIC WHITE MATTER LESIONS ON MRI: ICD-10-CM

## 2018-11-05 DIAGNOSIS — R41.3 MEMORY LOSS: Primary | ICD-10-CM

## 2018-11-05 DIAGNOSIS — Z12.39 BREAST CANCER SCREENING: ICD-10-CM

## 2018-11-05 DIAGNOSIS — I65.21 ATHEROSCLEROSIS OF RIGHT CAROTID ARTERY: ICD-10-CM

## 2018-11-05 DIAGNOSIS — R47.01 APHASIA: ICD-10-CM

## 2018-11-05 DIAGNOSIS — M51.16 LUMBAR DISC DISEASE WITH RADICULOPATHY: ICD-10-CM

## 2018-11-05 DIAGNOSIS — F17.200 SMOKER: ICD-10-CM

## 2018-11-05 PROCEDURE — G0296 VISIT TO DETERM LDCT ELIG: HCPCS | Performed by: INTERNAL MEDICINE

## 2018-11-05 PROCEDURE — 99214 OFFICE O/P EST MOD 30 MIN: CPT | Performed by: INTERNAL MEDICINE

## 2018-11-05 RX ORDER — OMEPRAZOLE 20 MG/1
20 CAPSULE, DELAYED RELEASE ORAL
Qty: 30 CAPSULE | Refills: 5 | Status: SHIPPED | OUTPATIENT
Start: 2018-11-05 | End: 2021-02-16 | Stop reason: SDUPTHER

## 2018-11-05 RX ORDER — HYDROCODONE BITARTRATE AND ACETAMINOPHEN 7.5; 325 MG/1; MG/1
1 TABLET ORAL EVERY 8 HOURS PRN
Qty: 90 TABLET | Refills: 0 | Status: SHIPPED | OUTPATIENT
Start: 2018-11-05 | End: 2019-01-02 | Stop reason: SDUPTHER

## 2018-11-05 NOTE — PROGRESS NOTES
SUBJECTIVE:  Devere Sandifer is a 65 y.o. female being evaluated for:    Chief Complaint   Patient presents with    Results     Patient here to discuss her Vascular screenings.  Back Pain     Patient not able to get her LESI approved she didn't have all the clinical information needed. Patient wants to do thru [facility]. HPI   Back is doing terrible  Pain shooting down her right leg TRied to get epidural covered and could not  Pain is bad and now having numbness in her vagina  Cannot have surgery as cannot stop smoking  Smoking 1 pack a day. Did vascular screening test at [facility] with  Large plaque in right internal carotid   Vision looks as though looking through a thick sheet of plastic  Just got new glasses for this Numbness in vagina and right leg which is likely from back  Not anywhere else      Continued problems with memory   Mixing up her words  No further migraines  Saw neurologist in [place]  And felt due to hx of migraines  Problem is worsening   Allergies   Allergen Reactions    Avelox [Moxifloxacin] Swelling and Rash    Chantix [Varenicline]      irritability and worsening depression     Codeine Nausea Only    Sulfa Antibiotics Swelling and Rash     Current Outpatient Prescriptions   Medication Sig Dispense Refill    HYDROcodone-acetaminophen (NORCO) 7.5-325 MG per tablet Take 1 tablet by mouth every 8 hours as needed for Pain for up to 30 days. . 90 tablet 0    omeprazole (PRILOSEC) 20 MG delayed release capsule Take 1 capsule by mouth daily (with breakfast) 30 capsule 5    lisinopril (PRINIVIL;ZESTRIL) 40 MG tablet TAKE 1 TABLET BY MOUTH DAILY 90 tablet 0    sertraline (ZOLOFT) 100 MG tablet Take 1 tablet by mouth daily 30 tablet 3    albuterol sulfate HFA (PROVENTIL HFA) 108 (90 Base) MCG/ACT inhaler Inhale 2 puffs into the lungs every 6 hours as needed for Wheezing 1 Inhaler 3    budesonide-formoterol (SYMBICORT) 160-4.5 MCG/ACT AERO Inhale 2 puffs into the lungs 2 times daily Rinse mouth after use. 1 Inhaler 5     No current facility-administered medications for this visit. Social History     Social History    Marital status:      Spouse name: N/A    Number of children: N/A    Years of education: N/A     Occupational History    Not on file. Social History Main Topics    Smoking status: Current Every Day Smoker     Packs/day: 1.00     Years: 34.00     Types: Cigarettes    Smokeless tobacco: Never Used    Alcohol use No    Drug use: No    Sexual activity: Not on file     Other Topics Concern    Not on file     Social History Narrative    No narrative on file      Past Medical History:   Diagnosis Date    Anxiety     Chronic back pain     Chronic pain     Depression     GERD (gastroesophageal reflux disease)     Lumbar disc disease     Migraine     Osteoarthritis     Sciatica of right side     Tobacco abuse     Unspecified essential hypertension      Past Surgical History:   Procedure Laterality Date    APPENDECTOMY      KNEE SURGERY Left     SHOULDER SURGERY Left        Review of Systems   Constitutional: Negative for fatigue, fever and unexpected weight change. HENT: Negative for nosebleeds. Eyes: Positive for visual disturbance. Respiratory: Negative for cough, chest tightness, shortness of breath and wheezing. Cardiovascular: Negative for chest pain, palpitations and leg swelling. Gastrointestinal: Positive for constipation (Chronic). Negative for abdominal pain, diarrhea, nausea and vomiting. Genitourinary: Negative for difficulty urinating and vaginal pain (Numbness). Stress incontinence   Musculoskeletal: Positive for arthralgias (hip ), back pain and gait problem. Skin: Positive for color change (Changing mole on her neck). Neurological: Positive for speech difficulty (Using the wrong words) and headaches (Have actually improved). Negative for dizziness and light-headedness.         Right sciatica Forgetful   Psychiatric/Behavioral: Positive for decreased concentration and sleep disturbance. Negative for dysphoric mood and suicidal ideas. The patient is nervous/anxious. OBJECTIVE:  /68 (Site: Right Upper Arm, Position: Sitting, Cuff Size: Medium Adult)   Pulse 68   Temp 98.1 °F (36.7 °C) (Oral)   Resp 16   Wt 130 lb 3.2 oz (59.1 kg)   LMP  (Approximate)   SpO2 98%   BMI 24.40 kg/m²      Body mass index is 24.4 kg/m². Physical Exam   Constitutional: She is oriented to person, place, and time. She appears well-developed and well-nourished. HENT:   Head: Normocephalic and atraumatic. Right Ear: External ear normal.   Left Ear: External ear normal.   Mouth/Throat: Oropharynx is clear and moist.   Tympanic membranes clear   Eyes: Pupils are equal, round, and reactive to light. Conjunctivae and EOM are normal.   Neck: Normal range of motion. Neck supple. No thyromegaly present. Cardiovascular: Normal rate, regular rhythm and normal heart sounds. Exam reveals no gallop and no friction rub. No murmur heard. Pulmonary/Chest: Effort normal. She exhibits no tenderness. Decreased and bronchial breath sounds   Abdominal: Soft. She exhibits no distension. There is no tenderness. No hepatosplenomegaly   Musculoskeletal: She exhibits tenderness (Right paraspinal). She exhibits no edema. Decreased flexion and extension of back   Lymphadenopathy:     She has no cervical adenopathy. Neurological: She is alert and oriented to person, place, and time. No cranial nerve deficit. Coordination normal.   Skin: Skin is warm and dry. Atypical mole on neck   Psychiatric: She has a normal mood and affect. Her behavior is normal.       ASSESSMENT/PLAN:    Radha Schaefer was seen today for results and back pain. Diagnoses and all orders for this visit:    Memory loss  -     MRI BRAIN WO CONTRAST; Future    Aphasia  -     MRI BRAIN WO CONTRAST;  Future    Vision changes  -     MRI BRAIN WO

## 2018-11-05 NOTE — PATIENT INSTRUCTIONS
undetected and untreated. If you are still smoking, the single most important thing that you can do to reduce your risk of dying of lung cancer is to quit. For this screening to be covered by Medicare and most other insurers, strict criteria must be met. If you do not meet these criteria, but still wish to undergo LDCT testing, you will be required to sign a waiver indicating your willingness to pay for the scan.

## 2018-11-12 ASSESSMENT — ENCOUNTER SYMPTOMS
BACK PAIN: 1
CONSTIPATION: 1
COLOR CHANGE: 1
DIARRHEA: 0
COUGH: 0
SHORTNESS OF BREATH: 0
NAUSEA: 0
ABDOMINAL PAIN: 0
CHEST TIGHTNESS: 0
VOMITING: 0
WHEEZING: 0

## 2018-11-13 DIAGNOSIS — G93.9 CHRONIC NON-SPECIFIC WHITE MATTER LESIONS ON MRI: ICD-10-CM

## 2018-11-13 DIAGNOSIS — R47.01 APHASIA: ICD-10-CM

## 2018-11-13 DIAGNOSIS — H53.9 VISION CHANGES: ICD-10-CM

## 2018-11-13 DIAGNOSIS — R41.3 MEMORY LOSS: ICD-10-CM

## 2018-11-14 ENCOUNTER — TELEPHONE (OUTPATIENT)
Dept: INTERNAL MEDICINE CLINIC | Age: 58
End: 2018-11-14

## 2018-12-05 DIAGNOSIS — Q76.49 VERTEBRAL ANOMALY: Primary | ICD-10-CM

## 2018-12-05 DIAGNOSIS — I77.1 SUBCLAVIAN ARTERIAL STENOSIS (HCC): ICD-10-CM

## 2018-12-06 ENCOUNTER — TELEPHONE (OUTPATIENT)
Dept: INTERNAL MEDICINE CLINIC | Age: 58
End: 2018-12-06

## 2018-12-06 DIAGNOSIS — I77.9 VERTEBRAL ARTERY DISEASE (HCC): Primary | ICD-10-CM

## 2018-12-06 DIAGNOSIS — I77.1 SUBCLAVIAN ARTERIAL STENOSIS (HCC): ICD-10-CM

## 2018-12-06 NOTE — TELEPHONE ENCOUNTER
----- Message from Anderson Olivares MD sent at 12/5/2018  6:29 PM EST -----  Carotids show some minimal disease but there is some vertebral and subclavian arterial disease. With problems I would send to a vascular surgeon.  Question if one at [facility] is all right

## 2018-12-06 NOTE — TELEPHONE ENCOUNTER
patient informed of results. Patient will see Dr Prince Alvarez at [facility].     Fax to patient @ [phone]

## 2019-01-02 ENCOUNTER — TELEPHONE (OUTPATIENT)
Dept: INTERNAL MEDICINE CLINIC | Age: 59
End: 2019-01-02

## 2019-01-02 DIAGNOSIS — G89.4 CHRONIC PAIN SYNDROME: ICD-10-CM

## 2019-01-02 RX ORDER — HYDROCODONE BITARTRATE AND ACETAMINOPHEN 7.5; 325 MG/1; MG/1
1 TABLET ORAL EVERY 8 HOURS PRN
Qty: 90 TABLET | Refills: 0 | Status: SHIPPED | OUTPATIENT
Start: 2019-01-02 | End: 2019-02-14 | Stop reason: SDUPTHER

## 2019-01-17 DIAGNOSIS — F41.9 ANXIETY: ICD-10-CM

## 2019-01-17 RX ORDER — SERTRALINE HYDROCHLORIDE 100 MG/1
100 TABLET, FILM COATED ORAL DAILY
Qty: 90 TABLET | Refills: 1 | Status: SHIPPED | OUTPATIENT
Start: 2019-01-17 | End: 2019-08-20 | Stop reason: SDUPTHER

## 2019-02-12 DIAGNOSIS — I10 ESSENTIAL HYPERTENSION: ICD-10-CM

## 2019-02-12 RX ORDER — LISINOPRIL 40 MG/1
40 TABLET ORAL DAILY
Qty: 90 TABLET | Refills: 0 | Status: SHIPPED | OUTPATIENT
Start: 2019-02-12 | End: 2019-05-09 | Stop reason: SDUPTHER

## 2019-02-14 ENCOUNTER — OFFICE VISIT (OUTPATIENT)
Dept: INTERNAL MEDICINE CLINIC | Age: 59
End: 2019-02-14
Payer: COMMERCIAL

## 2019-02-14 VITALS
HEART RATE: 94 BPM | OXYGEN SATURATION: 99 % | BODY MASS INDEX: 25.53 KG/M2 | TEMPERATURE: 98.4 F | WEIGHT: 136.2 LBS | RESPIRATION RATE: 16 BRPM | DIASTOLIC BLOOD PRESSURE: 66 MMHG | SYSTOLIC BLOOD PRESSURE: 112 MMHG

## 2019-02-14 DIAGNOSIS — R07.9 CHEST PAIN, UNSPECIFIED TYPE: Primary | ICD-10-CM

## 2019-02-14 DIAGNOSIS — G89.4 CHRONIC PAIN SYNDROME: ICD-10-CM

## 2019-02-14 DIAGNOSIS — R06.09 DOE (DYSPNEA ON EXERTION): ICD-10-CM

## 2019-02-14 DIAGNOSIS — H65.91 RIGHT SEROUS OTITIS MEDIA, UNSPECIFIED CHRONICITY: ICD-10-CM

## 2019-02-14 DIAGNOSIS — R39.89 URINARY PROBLEM: ICD-10-CM

## 2019-02-14 LAB
BILIRUBIN, POC: NORMAL
BLOOD URINE, POC: NORMAL
CLARITY, POC: CLEAR
COLOR, POC: NORMAL
GLUCOSE URINE, POC: NORMAL
KETONES, POC: NORMAL
LEUKOCYTE EST, POC: NORMAL
NITRITE, POC: NORMAL
PH, POC: 7.5
PROTEIN, POC: NORMAL
SPECIFIC GRAVITY, POC: 1.02
UROBILINOGEN, POC: 0.2

## 2019-02-14 PROCEDURE — 81002 URINALYSIS NONAUTO W/O SCOPE: CPT | Performed by: INTERNAL MEDICINE

## 2019-02-14 PROCEDURE — 93000 ELECTROCARDIOGRAM COMPLETE: CPT | Performed by: INTERNAL MEDICINE

## 2019-02-14 PROCEDURE — 99214 OFFICE O/P EST MOD 30 MIN: CPT | Performed by: INTERNAL MEDICINE

## 2019-02-14 RX ORDER — FLUTICASONE PROPIONATE 50 MCG
2 SPRAY, SUSPENSION (ML) NASAL DAILY
Qty: 3 BOTTLE | Refills: 1 | Status: SHIPPED | OUTPATIENT
Start: 2019-02-14 | End: 2021-02-16

## 2019-02-14 RX ORDER — ASPIRIN 81 MG/1
81 TABLET ORAL DAILY
Qty: 30 TABLET | Refills: 0 | Status: SHIPPED | OUTPATIENT
Start: 2019-02-14 | End: 2019-08-21 | Stop reason: SDUPTHER

## 2019-02-14 RX ORDER — HYDROCODONE BITARTRATE AND ACETAMINOPHEN 7.5; 325 MG/1; MG/1
1 TABLET ORAL EVERY 8 HOURS PRN
Qty: 90 TABLET | Refills: 0 | Status: SHIPPED | OUTPATIENT
Start: 2019-02-14 | End: 2019-03-19 | Stop reason: SDUPTHER

## 2019-02-14 ASSESSMENT — ENCOUNTER SYMPTOMS: BACK PAIN: 1

## 2019-02-18 DIAGNOSIS — E78.5 HYPERLIPIDEMIA, UNSPECIFIED HYPERLIPIDEMIA TYPE: Primary | ICD-10-CM

## 2019-02-18 RX ORDER — ATORVASTATIN CALCIUM 10 MG/1
10 TABLET, FILM COATED ORAL DAILY
Qty: 30 TABLET | Refills: 3 | Status: SHIPPED | OUTPATIENT
Start: 2019-02-18 | End: 2019-11-13 | Stop reason: SDUPTHER

## 2019-02-24 ASSESSMENT — ENCOUNTER SYMPTOMS
NAUSEA: 0
CONSTIPATION: 1
VOMITING: 0
ABDOMINAL PAIN: 0
SHORTNESS OF BREATH: 1
COUGH: 1

## 2019-03-13 ENCOUNTER — TELEPHONE (OUTPATIENT)
Dept: INTERNAL MEDICINE CLINIC | Age: 59
End: 2019-03-13

## 2019-03-19 ENCOUNTER — TELEPHONE (OUTPATIENT)
Dept: INTERNAL MEDICINE CLINIC | Age: 59
End: 2019-03-19

## 2019-03-19 DIAGNOSIS — G89.4 CHRONIC PAIN SYNDROME: ICD-10-CM

## 2019-03-19 RX ORDER — HYDROCODONE BITARTRATE AND ACETAMINOPHEN 7.5; 325 MG/1; MG/1
1 TABLET ORAL EVERY 8 HOURS PRN
Qty: 90 TABLET | Refills: 0 | Status: SHIPPED | OUTPATIENT
Start: 2019-03-19 | End: 2019-04-22 | Stop reason: SDUPTHER

## 2019-04-22 ENCOUNTER — TELEPHONE (OUTPATIENT)
Dept: INTERNAL MEDICINE CLINIC | Age: 59
End: 2019-04-22

## 2019-04-22 DIAGNOSIS — G89.4 CHRONIC PAIN SYNDROME: ICD-10-CM

## 2019-04-22 RX ORDER — HYDROCODONE BITARTRATE AND ACETAMINOPHEN 7.5; 325 MG/1; MG/1
1 TABLET ORAL EVERY 8 HOURS PRN
Qty: 90 TABLET | Refills: 0 | Status: SHIPPED | OUTPATIENT
Start: 2019-04-22 | End: 2019-05-29 | Stop reason: SDUPTHER

## 2019-05-09 DIAGNOSIS — I10 ESSENTIAL HYPERTENSION: ICD-10-CM

## 2019-05-09 DIAGNOSIS — J44.9 CHRONIC OBSTRUCTIVE PULMONARY DISEASE, UNSPECIFIED COPD TYPE (HCC): ICD-10-CM

## 2019-05-10 RX ORDER — LISINOPRIL 40 MG/1
40 TABLET ORAL DAILY
Qty: 90 TABLET | Refills: 1 | Status: SHIPPED | OUTPATIENT
Start: 2019-05-10 | End: 2019-09-14 | Stop reason: SDUPTHER

## 2019-05-10 RX ORDER — BUDESONIDE AND FORMOTEROL FUMARATE DIHYDRATE 160; 4.5 UG/1; UG/1
AEROSOL RESPIRATORY (INHALATION)
Qty: 10.2 G | Refills: 5 | Status: SHIPPED | OUTPATIENT
Start: 2019-05-10 | End: 2019-05-29 | Stop reason: CLARIF

## 2019-05-16 ENCOUNTER — TELEPHONE (OUTPATIENT)
Dept: INTERNAL MEDICINE CLINIC | Age: 59
End: 2019-05-16

## 2019-05-16 DIAGNOSIS — M79.89 PAIN AND SWELLING OF LOWER LEG, LEFT: Primary | ICD-10-CM

## 2019-05-16 DIAGNOSIS — M79.662 PAIN AND SWELLING OF LOWER LEG, LEFT: Primary | ICD-10-CM

## 2019-05-16 NOTE — TELEPHONE ENCOUNTER
Fell 10 days ago running the dogs in the woods. Left Lower Leg swollen, painful, feverish, no redness, back of calf tight and firm    Requesting an order for a venous doppler left lower leg, fax to [phone]. Per Dr Sarah Bear ok to order venous doppler left lower leg    Patient was offered an appt for today but wanted to wait to see what the results of the doppler are, she'll call back if she feels she needs to get in today.

## 2019-05-22 ENCOUNTER — TELEPHONE (OUTPATIENT)
Dept: INTERNAL MEDICINE CLINIC | Age: 59
End: 2019-05-22

## 2019-05-22 RX ORDER — AZITHROMYCIN 250 MG/1
250 TABLET, FILM COATED ORAL SEE ADMIN INSTRUCTIONS
Qty: 6 TABLET | Refills: 0 | Status: SHIPPED | OUTPATIENT
Start: 2019-05-22 | End: 2019-05-27

## 2019-05-22 NOTE — TELEPHONE ENCOUNTER
Pt has right ear pain from fluid buildup. Pt wants to know if meds can be sent to [facility] in [place].

## 2019-05-29 ENCOUNTER — OFFICE VISIT (OUTPATIENT)
Dept: INTERNAL MEDICINE CLINIC | Age: 59
End: 2019-05-29
Payer: COMMERCIAL

## 2019-05-29 VITALS
TEMPERATURE: 97.9 F | HEART RATE: 77 BPM | WEIGHT: 135 LBS | OXYGEN SATURATION: 98 % | DIASTOLIC BLOOD PRESSURE: 68 MMHG | SYSTOLIC BLOOD PRESSURE: 106 MMHG | HEIGHT: 61 IN | BODY MASS INDEX: 25.49 KG/M2

## 2019-05-29 DIAGNOSIS — I10 ESSENTIAL HYPERTENSION: ICD-10-CM

## 2019-05-29 DIAGNOSIS — Z12.39 BREAST CANCER SCREENING: ICD-10-CM

## 2019-05-29 DIAGNOSIS — G89.4 CHRONIC PAIN SYNDROME: Primary | ICD-10-CM

## 2019-05-29 DIAGNOSIS — F17.200 SMOKER: ICD-10-CM

## 2019-05-29 DIAGNOSIS — M51.36 DDD (DEGENERATIVE DISC DISEASE), LUMBAR: ICD-10-CM

## 2019-05-29 DIAGNOSIS — Z87.891 PERSONAL HISTORY OF TOBACCO USE: ICD-10-CM

## 2019-05-29 DIAGNOSIS — J44.9 CHRONIC OBSTRUCTIVE PULMONARY DISEASE, UNSPECIFIED COPD TYPE (HCC): ICD-10-CM

## 2019-05-29 PROCEDURE — 99214 OFFICE O/P EST MOD 30 MIN: CPT | Performed by: INTERNAL MEDICINE

## 2019-05-29 RX ORDER — HYDROCODONE BITARTRATE AND ACETAMINOPHEN 7.5; 325 MG/1; MG/1
1 TABLET ORAL EVERY 8 HOURS PRN
COMMUNITY
End: 2019-05-29 | Stop reason: SDUPTHER

## 2019-05-29 RX ORDER — HYDROCODONE BITARTRATE AND ACETAMINOPHEN 7.5; 325 MG/1; MG/1
1 TABLET ORAL EVERY 8 HOURS PRN
Qty: 90 TABLET | Refills: 0 | Status: SHIPPED | OUTPATIENT
Start: 2019-05-29 | End: 2019-06-27 | Stop reason: SDUPTHER

## 2019-05-29 NOTE — PROGRESS NOTES
SUBJECTIVE:  Yosi Chavez is a 63 y.o. female being evaluated for:    Chief Complaint   Patient presents with    Lower Back Pain     3 mo f/u low back pain-refill hydrocodone       HPI   Chronic pain  Low back  Down right leg  Stable  Urinary incontinence. Just happens  Has not seen Dr Cristóbal Dick in a while  Still smoking 1 pack a day. Numb tingling on her left calf Tried injections and helped for only a very short time. Chantix made her crazy in her head. Not able to do her normal activities. Trouble doing her laundry ect     symbicort  helped with breathing issues but no longer covered by her insurance. Allergies   Allergen Reactions    Avelox [Moxifloxacin] Swelling and Rash    Chantix [Varenicline] Other (See Comments)     irritability and worsening depression     Codeine Nausea Only    Sulfa Antibiotics Swelling and Rash     Current Outpatient Medications   Medication Sig Dispense Refill    HYDROcodone-acetaminophen (NORCO) 7.5-325 MG per tablet Take 1 tablet by mouth every 8 hours as needed for Pain for up to 30 days. 90 tablet 0    Fluticasone-Umeclidin-Vilant 100-62.5-25 MCG/INH AEPB Inhale 1 puff into the lungs daily 1 each 5    lisinopril (PRINIVIL;ZESTRIL) 40 MG tablet TAKE 1 TABLET BY MOUTH DAILY 90 tablet 1    atorvastatin (LIPITOR) 10 MG tablet Take 1 tablet by mouth daily 30 tablet 3    fluticasone (FLONASE) 50 MCG/ACT nasal spray 2 sprays by Each Nare route daily 3 Bottle 1    aspirin EC 81 MG EC tablet Take 1 tablet by mouth daily 30 tablet 0    sertraline (ZOLOFT) 100 MG tablet TAKE 1 TABLET BY MOUTH DAILY 90 tablet 1    omeprazole (PRILOSEC) 20 MG delayed release capsule Take 1 capsule by mouth daily (with breakfast) 30 capsule 5    albuterol sulfate HFA (PROVENTIL HFA) 108 (90 Base) MCG/ACT inhaler Inhale 2 puffs into the lungs every 6 hours as needed for Wheezing 1 Inhaler 3     No current facility-administered medications for this visit.           Social History leg       Review of Systems   Constitutional: Positive for activity change and unexpected weight change (Upset with her weight gain due to inactivity). HENT: Positive for ear pain and postnasal drip. Negative for nosebleeds. Respiratory: Positive for cough and shortness of breath. Cardiovascular: Negative for chest pain, palpitations and leg swelling. Gastrointestinal: Positive for constipation (Chronic constipation). Negative for abdominal pain, nausea and vomiting. Genitourinary:        Urinary incontinence   Musculoskeletal: Positive for back pain. Neurological: Negative for dizziness, light-headedness and headaches. Sciatica down her right leg   Psychiatric/Behavioral: Negative for dysphoric mood. Depression has been doing well       OBJECTIVE:  /68   Pulse 77   Temp 97.9 °F (36.6 °C) (Oral)   Ht 5' 1\" (1.549 m)   Wt 135 lb (61.2 kg)   LMP  (LMP Unknown)   SpO2 98%   Breastfeeding? No   BMI 25.51 kg/m²      Body mass index is 25.51 kg/m². Physical Exam   Constitutional: She is oriented to person, place, and time. She appears well-developed and well-nourished. No distress. HENT:   Head: Normocephalic and atraumatic. Eyes: Conjunctivae are normal.   Neck: Normal range of motion. Neck supple. No thyromegaly present. Cardiovascular: Normal rate, regular rhythm and normal heart sounds. Exam reveals no gallop and no friction rub. No murmur heard. Pulmonary/Chest: Effort normal. She exhibits no tenderness. Decreased and bronchial breath sounds   Abdominal: Soft. She exhibits no distension. There is no tenderness. No hepatosplenomegaly   Musculoskeletal: She exhibits tenderness (Right paraspinal). She exhibits no edema. Decreased flexion and extension of back   Lymphadenopathy:     She has no cervical adenopathy. Neurological: She is alert and oriented to person, place, and time. Coordination normal.   Skin: Skin is warm and dry.    Psychiatric: She has a lung cancer. A low-dose CT scan uses much less radiation than a normal CT scan and shows a more detailed image of the lungs than a standard X-ray. The goal of lung cancer screening is to find cancer early, before it has a chance to grow, spread, or cause problems. One large study found that smokers who were screened with low-dose CT scans were less likely to die of lung cancer than those who were screened with standard X-ray. Below is a summary of the things you need to know regarding screening for lung cancer with low-dose computed tomography (LDCT). This is a screening program that involves routine annual screening with LDCT studies of the lung. The LDCTs are done using low-dose radiation that is not thought to increase your cancer risk. If you have other serious medical conditions (other cancers, congestive heart failure) that limit your life expectancy to less than 10 years, you should not undergo lung cancer screening with LDCT. The chance is 20%-60% that the LDCT result will show abnormalities. This would require additional testing which could include repeat imaging or even invasive procedures. Most (about 95%) of \"abnormal\" LDCT results are false in the sense that no lung cancer is ultimately found. Additionally, some (about 10%) of the cancers found would not affect your life expectancy, even if undetected and untreated. If you are still smoking, the single most important thing that you can do to reduce your risk of dying of lung cancer is to quit. For this screening to be covered by Medicare and most other insurers, strict criteria must be met. If you do not meet these criteria, but still wish to undergo LDCT testing, you will be required to sign a waiver indicating your willingness to pay for the scan.      Low Dose CT (LDCT) Lung Screening criteria met   Age 50-69   Pack year smoking >30   Still smoking or less than 15 year since quit   No sign or symptoms of lung cancer   > 11 months since last LDCT     Risks and benefits of lung cancer screening with LDCT scans discussed:    Significance of positive screen - False-positive LDCT results often occur. 95% of all positive results do not lead to a diagnosis of cancer. Usually further imaging can resolve most false-positive results; however, some patients may require invasive procedures. Over diagnosis risk - 10% to 12% of screen-detected lung cancer cases are over diagnosed--that is, the cancer would not have been detected in the patient's lifetime without the screening. Need for follow up screens annually to continue lung cancer screening effectiveness     Risks associated with radiation from annual LDCT- Radiation exposure is about the same as for a mammogram, which is about 1/3 of the annual background radiation exposure from everyday life. Starting screening at age 54 is not likely to increase cancer risk from radiation exposure. Patients with comorbidities resulting in life expectancy of < 10 years, or that would preclude treatment of an abnormality identified on CT, should not be screened due to lack of benefit.     To obtain maximal benefit from this screening, smoking cessation and long-term abstinence from smoking is critical

## 2019-05-29 NOTE — LETTER
MEDICATION AGREEMENT     Dali Adames  [birth year]      For certain conditions, multiple classes of medications may be used to help better manage your symptoms, and to improve your ability to function at home, work and in social settings. However, these medications do have risks, which will be discussed with you, including addiction and dependency. The following prescribed medications need frequent monitoring and will require you to partner and assist in your healthcare. Medication  Dose, instructions and quantity as indicated on current prescription bottle Diagnosis/Reason(s) for Taking Category   HYDROcodone-acetaminophen (NORCO) 7.5-325 MG per tablet  Take 1 tablet by mouth every 8 hours as needed #90 per 30   Chronic pain syndrome (G89.4) DDD (degenerative disc disease), lumbar M51.36                            Benefits and goals of Controlled Substance Medications: There are two potential goals for your treatment: (1) decreased pain and suffering (2) improved daily life functions. There are many possible treatments for your chronic condition(s), and, in addition to controlled substance medications, we will try alternatives such as physical therapy, yoga, massage, home daily exercise, meditation, relaxation techniques, injections, chiropractic manipulations, surgery, cognitive therapy, hypnosis and many medications that are not habit-forming. Use of controlled substance medications may be helpful, but they are unlikely to resolve all of your symptoms or restore all function. Risks of Controlled Substance Medications:    Opioid pain medications: These medications can lead to problems such as addiction/dependence, sedation, lightheadedness/dizziness, memory issues, falls, constipation, nausea, or vomiting. They may also impair the ability to drive or operate machinery.   Additionally, these medications may lower testosterone levels, leading to loss of bone strength, stamina and sex drive. They may cause problems with breathing, sleep apnea and reduced coughing, which are especially dangerous for patients with lung disease. Overdose or dangerous interactions with alcohol and other medications may occur, leading to death. Hyperalgesia may develop, in which patients receiving opioids for the treatment of pain may actually become more sensitive to certain painful stimuli, and in some cases, experience pain from ordinarily non-painful stimuli. Women between the ages of 14-53 who could become pregnant should carefully weigh the risks and benefits of opioids with their physicians, as these medications increase the risk of pregnancy complications, including miscarriage,  delivery and stillbirth. It is also possible for babies to be born addicted to opioids. Opioid dependence withdrawal symptoms may include; feelings of uneasiness, increased pain, irritability, belly pain, diarrhea, sweats and goose-flesh. Benzodiazepines and non-benzodiazepine sleep medications: These medications can lead to problems such as addiction/dependence, sedation, fatigue, lightheadedness, dizziness, incoordination, falls, depression, hallucinations, and impaired judgment, memory and concentration. The ability to drive and operate machinery may also be affected. Abnormal sleep-related behaviors have been reported, including sleep walking, driving, making telephone calls, eating, or having sex while not fully awake. These medications can suppress breathing and worsen sleep apnea, particularly when combined with alcohol or other sedating medications, potentially leading to death. Dependence withdrawal symptoms may include tremors, anxiety, hallucinations and seizures. Stimulants:  Common adverse effects include addiction/dependence, increased blood pressure and heart rate, decreased appetite, nausea, involuntary weight loss, insomnia, irritability, and headaches.   These risks may increase when these medications are combined with other stimulants, such as caffeine pills or energy drinks, certain weight loss supplements and oral decongestants. Dependence withdrawal symptoms may include depressed mood, loss of interest, suicidal thoughts, anxiety, fatigue, appetite changes and agitation. Testosterone replacement therapy:  Potential side effects include increased risk of stroke and heart attack, blood clots, increased blood pressure, increased cholesterol, enlarged prostate, sleep apnea, irritability/aggression and other mood disorders, and decreased fertility. Other:     1. I understand that I have the following responsibilities:  · I will take medications at the dose and frequency prescribed. · I will not increase or change how I take my medications without the approval of the health care provider who signs this Medication Agreement. · I will arrange for refills at the prescribed interval ONLY during regular office hours. I will not ask for refills earlier than agreed, after-hours, on holidays or on weekends. · I will obtain all refills for these medications at  ·  [facility] [address] [place] [address], [facility] [phone] - F [phone]_, with full consent for my provider and pharmacist to exchange information in writing or verbally. · I will not request any pain medications or controlled substances from other providers and will inform this provider of all other medications I am taking. · I will inform my other health care providers that I am taking these medications and of the existence of this [address]. In the event of an emergency, I will provide the same information to the emergency department providers. · I will protect my prescriptions and medications. I understand that lost or misplaced prescriptions will not be replaced.   · I will keep medications only for my own use and will not share them with others. I will keep all medications away from children. · I agree to participate in any medical, psychological or psychiatric assessments recommended by my provider. · I will actively participate in any program designed to improve function, including social, physical, psychological and daily or work activities. 2. I will not use illegal or street drugs or another person's prescription. If I have an addiction problem with drugs or alcohol and my provider asks me to enter a program to address this issue, I agree to follow through. Such programs may include:  · 12-Step program and securing a sponsor  · Individual counseling   · Inpatient or outpatient treatment  · Other:_____________________________________________________________________________________________________________________________________________    If in treatment, I will request that a copy of the programs initial evaluation and treatment recommendations be sent to this provider and will not expect refills until that is received. I will also request written monthly updates be sent to this provider to verify my continuing treatment. 3. I will consent to drug screening upon my providers request to assure I am only taking the prescribed drugs, described in this MEDICATION AGREEMENT. I understand that a drug screen is a laboratory test in which a sample of my urine, blood or saliva is checked to see what drugs I have been taking. 4. I agree that I will treat the providers and staff at this office with respect at all times. I will keep all of my scheduled appointments, but if I need to cancel my appointment, I will do so a minimum of 24 hours before it is scheduled. 5. I understand that this provider may stop prescribing the medications listed if:  · I do not show any improvement in pain, or my activity has not improved. · I develop rapid tolerance or loss of improvement, as described in my treatment plan.

## 2019-06-08 ASSESSMENT — ENCOUNTER SYMPTOMS
COUGH: 1
VOMITING: 0
NAUSEA: 0
BACK PAIN: 1
CONSTIPATION: 1
ABDOMINAL PAIN: 0
SHORTNESS OF BREATH: 1

## 2019-06-27 ENCOUNTER — TELEPHONE (OUTPATIENT)
Dept: INTERNAL MEDICINE CLINIC | Age: 59
End: 2019-06-27

## 2019-06-27 DIAGNOSIS — G89.4 CHRONIC PAIN SYNDROME: ICD-10-CM

## 2019-06-27 DIAGNOSIS — M51.36 DDD (DEGENERATIVE DISC DISEASE), LUMBAR: ICD-10-CM

## 2019-06-27 RX ORDER — HYDROCODONE BITARTRATE AND ACETAMINOPHEN 7.5; 325 MG/1; MG/1
1 TABLET ORAL EVERY 8 HOURS PRN
Qty: 135 TABLET | Refills: 0 | Status: SHIPPED | OUTPATIENT
Start: 2019-06-27 | End: 2019-08-11

## 2019-06-27 NOTE — TELEPHONE ENCOUNTER
Pt needs a new rx on HYDROcodone-acetaminophen (NORCO) 7.5-325 MG per tablet to be sent to [facility] in [place].

## 2019-07-26 ENCOUNTER — TELEPHONE (OUTPATIENT)
Dept: INTERNAL MEDICINE CLINIC | Age: 59
End: 2019-07-26

## 2019-07-26 RX ORDER — AMOXICILLIN 250 MG/1
250 CAPSULE ORAL 3 TIMES DAILY
Qty: 21 CAPSULE | Refills: 0 | Status: SHIPPED | OUTPATIENT
Start: 2019-07-26 | End: 2019-08-02

## 2019-08-12 DIAGNOSIS — G89.4 CHRONIC PAIN SYNDROME: ICD-10-CM

## 2019-08-12 DIAGNOSIS — M51.36 DDD (DEGENERATIVE DISC DISEASE), LUMBAR: ICD-10-CM

## 2019-08-12 RX ORDER — HYDROCODONE BITARTRATE AND ACETAMINOPHEN 7.5; 325 MG/1; MG/1
1 TABLET ORAL EVERY 8 HOURS PRN
Qty: 90 TABLET | Refills: 0 | Status: SHIPPED | OUTPATIENT
Start: 2019-08-12 | End: 2019-09-16 | Stop reason: SDUPTHER

## 2019-08-20 DIAGNOSIS — F41.9 ANXIETY: ICD-10-CM

## 2019-08-20 RX ORDER — SERTRALINE HYDROCHLORIDE 100 MG/1
100 TABLET, FILM COATED ORAL DAILY
Qty: 90 TABLET | Refills: 1 | Status: SHIPPED | OUTPATIENT
Start: 2019-08-20 | End: 2019-12-13 | Stop reason: SDUPTHER

## 2019-08-21 DIAGNOSIS — R07.9 CHEST PAIN, UNSPECIFIED TYPE: ICD-10-CM

## 2019-08-21 DIAGNOSIS — R06.09 DOE (DYSPNEA ON EXERTION): ICD-10-CM

## 2019-08-21 RX ORDER — ASPIRIN 81 MG/1
TABLET, COATED ORAL
Qty: 30 TABLET | Refills: 5 | Status: SHIPPED | OUTPATIENT
Start: 2019-08-21 | End: 2021-02-16

## 2019-09-14 DIAGNOSIS — M51.36 DDD (DEGENERATIVE DISC DISEASE), LUMBAR: ICD-10-CM

## 2019-09-14 DIAGNOSIS — E78.5 HYPERLIPIDEMIA, UNSPECIFIED HYPERLIPIDEMIA TYPE: Primary | ICD-10-CM

## 2019-09-14 DIAGNOSIS — Z79.899 MEDICATION MANAGEMENT: ICD-10-CM

## 2019-09-14 DIAGNOSIS — I10 ESSENTIAL HYPERTENSION: ICD-10-CM

## 2019-09-16 ENCOUNTER — TELEPHONE (OUTPATIENT)
Dept: INTERNAL MEDICINE CLINIC | Age: 59
End: 2019-09-16

## 2019-09-16 DIAGNOSIS — M51.36 DDD (DEGENERATIVE DISC DISEASE), LUMBAR: ICD-10-CM

## 2019-09-16 DIAGNOSIS — G89.4 CHRONIC PAIN SYNDROME: ICD-10-CM

## 2019-09-16 RX ORDER — HYDROCODONE BITARTRATE AND ACETAMINOPHEN 7.5; 325 MG/1; MG/1
1 TABLET ORAL EVERY 8 HOURS PRN
Qty: 90 TABLET | Refills: 0 | Status: SHIPPED | OUTPATIENT
Start: 2019-09-16 | End: 2019-10-16 | Stop reason: SDUPTHER

## 2019-09-16 RX ORDER — LISINOPRIL 40 MG/1
TABLET ORAL
Qty: 90 TABLET | Refills: 0 | Status: SHIPPED | OUTPATIENT
Start: 2019-09-16 | End: 2019-12-13 | Stop reason: SDUPTHER

## 2019-10-02 ENCOUNTER — OFFICE VISIT (OUTPATIENT)
Dept: INTERNAL MEDICINE CLINIC | Age: 59
End: 2019-10-02
Payer: COMMERCIAL

## 2019-10-02 VITALS
DIASTOLIC BLOOD PRESSURE: 72 MMHG | OXYGEN SATURATION: 98 % | SYSTOLIC BLOOD PRESSURE: 106 MMHG | HEART RATE: 88 BPM | BODY MASS INDEX: 25.41 KG/M2 | HEIGHT: 61 IN | TEMPERATURE: 98 F | WEIGHT: 134.6 LBS

## 2019-10-02 DIAGNOSIS — R31.29 OTHER MICROSCOPIC HEMATURIA: ICD-10-CM

## 2019-10-02 DIAGNOSIS — I10 ESSENTIAL HYPERTENSION: ICD-10-CM

## 2019-10-02 DIAGNOSIS — R19.02 ABDOMINAL MASS, LEFT UPPER QUADRANT: ICD-10-CM

## 2019-10-02 DIAGNOSIS — Z87.891 PERSONAL HISTORY OF TOBACCO USE: ICD-10-CM

## 2019-10-02 DIAGNOSIS — R10.12 LEFT UPPER QUADRANT PAIN: ICD-10-CM

## 2019-10-02 DIAGNOSIS — Z85.820 HISTORY OF MELANOMA: ICD-10-CM

## 2019-10-02 DIAGNOSIS — F17.200 SMOKER: ICD-10-CM

## 2019-10-02 DIAGNOSIS — M54.31 SCIATICA OF RIGHT SIDE: ICD-10-CM

## 2019-10-02 DIAGNOSIS — G89.4 CHRONIC PAIN SYNDROME: Primary | ICD-10-CM

## 2019-10-02 DIAGNOSIS — E78.5 HYPERLIPIDEMIA, UNSPECIFIED HYPERLIPIDEMIA TYPE: ICD-10-CM

## 2019-10-02 DIAGNOSIS — R35.0 FREQUENT URINATION: ICD-10-CM

## 2019-10-02 LAB
BILIRUBIN, POC: ABNORMAL
BLOOD URINE, POC: ABNORMAL
CLARITY, POC: CLEAR
COLOR, POC: ABNORMAL
GLUCOSE URINE, POC: ABNORMAL
KETONES, POC: ABNORMAL
LEUKOCYTE EST, POC: ABNORMAL
NITRITE, POC: ABNORMAL
PH, POC: 7
PROTEIN, POC: ABNORMAL
SPECIFIC GRAVITY, POC: 1.01
UROBILINOGEN, POC: 0.2

## 2019-10-02 PROCEDURE — 99214 OFFICE O/P EST MOD 30 MIN: CPT | Performed by: INTERNAL MEDICINE

## 2019-10-02 PROCEDURE — G0296 VISIT TO DETERM LDCT ELIG: HCPCS | Performed by: INTERNAL MEDICINE

## 2019-10-02 PROCEDURE — 90471 IMMUNIZATION ADMIN: CPT | Performed by: INTERNAL MEDICINE

## 2019-10-02 PROCEDURE — 81002 URINALYSIS NONAUTO W/O SCOPE: CPT | Performed by: INTERNAL MEDICINE

## 2019-10-02 PROCEDURE — 90732 PPSV23 VACC 2 YRS+ SUBQ/IM: CPT | Performed by: INTERNAL MEDICINE

## 2019-10-04 LAB — URINE CULTURE, ROUTINE: NORMAL

## 2019-10-09 ENCOUNTER — TELEPHONE (OUTPATIENT)
Dept: INTERNAL MEDICINE CLINIC | Age: 59
End: 2019-10-09

## 2019-10-13 ASSESSMENT — ENCOUNTER SYMPTOMS
SHORTNESS OF BREATH: 0
WHEEZING: 0
NAUSEA: 0
VOMITING: 0
CONSTIPATION: 1
COUGH: 1
BACK PAIN: 1
DIARRHEA: 0
ABDOMINAL PAIN: 1

## 2019-10-16 ENCOUNTER — TELEPHONE (OUTPATIENT)
Dept: INTERNAL MEDICINE CLINIC | Age: 59
End: 2019-10-16

## 2019-10-16 DIAGNOSIS — G89.4 CHRONIC PAIN SYNDROME: ICD-10-CM

## 2019-10-16 DIAGNOSIS — M51.36 DDD (DEGENERATIVE DISC DISEASE), LUMBAR: ICD-10-CM

## 2019-10-16 RX ORDER — HYDROCODONE BITARTRATE AND ACETAMINOPHEN 7.5; 325 MG/1; MG/1
1 TABLET ORAL EVERY 8 HOURS PRN
Qty: 90 TABLET | Refills: 0 | Status: SHIPPED | OUTPATIENT
Start: 2019-10-16 | End: 2019-11-18 | Stop reason: SDUPTHER

## 2019-11-13 RX ORDER — ATORVASTATIN CALCIUM 10 MG/1
10 TABLET, FILM COATED ORAL DAILY
Qty: 30 TABLET | Refills: 2 | Status: SHIPPED | OUTPATIENT
Start: 2019-11-13 | End: 2020-03-12

## 2019-11-18 ENCOUNTER — TELEPHONE (OUTPATIENT)
Dept: INTERNAL MEDICINE CLINIC | Age: 59
End: 2019-11-18

## 2019-11-18 DIAGNOSIS — G89.4 CHRONIC PAIN SYNDROME: ICD-10-CM

## 2019-11-18 DIAGNOSIS — M51.36 DDD (DEGENERATIVE DISC DISEASE), LUMBAR: ICD-10-CM

## 2019-11-18 RX ORDER — HYDROCODONE BITARTRATE AND ACETAMINOPHEN 7.5; 325 MG/1; MG/1
1 TABLET ORAL EVERY 8 HOURS PRN
Qty: 90 TABLET | Refills: 0 | Status: SHIPPED | OUTPATIENT
Start: 2019-11-18 | End: 2019-12-18 | Stop reason: SDUPTHER

## 2019-12-13 DIAGNOSIS — I10 ESSENTIAL HYPERTENSION: ICD-10-CM

## 2019-12-13 DIAGNOSIS — F41.9 ANXIETY: ICD-10-CM

## 2019-12-13 RX ORDER — LISINOPRIL 40 MG/1
TABLET ORAL
Qty: 90 TABLET | Refills: 0 | Status: SHIPPED | OUTPATIENT
Start: 2019-12-13 | End: 2020-03-12

## 2019-12-13 RX ORDER — SERTRALINE HYDROCHLORIDE 100 MG/1
TABLET, FILM COATED ORAL
Qty: 90 TABLET | Refills: 0 | Status: SHIPPED | OUTPATIENT
Start: 2019-12-13 | End: 2020-06-08

## 2019-12-18 DIAGNOSIS — M51.36 DDD (DEGENERATIVE DISC DISEASE), LUMBAR: ICD-10-CM

## 2019-12-18 DIAGNOSIS — G89.4 CHRONIC PAIN SYNDROME: ICD-10-CM

## 2019-12-18 RX ORDER — HYDROCODONE BITARTRATE AND ACETAMINOPHEN 7.5; 325 MG/1; MG/1
1 TABLET ORAL EVERY 8 HOURS PRN
Qty: 90 TABLET | Refills: 0 | Status: SHIPPED | OUTPATIENT
Start: 2019-12-18 | End: 2020-01-15 | Stop reason: SDUPTHER

## 2020-01-02 ENCOUNTER — TELEPHONE (OUTPATIENT)
Dept: INTERNAL MEDICINE CLINIC | Age: 60
End: 2020-01-02

## 2020-01-06 ENCOUNTER — TELEPHONE (OUTPATIENT)
Dept: INTERNAL MEDICINE CLINIC | Age: 60
End: 2020-01-06

## 2020-01-06 NOTE — TELEPHONE ENCOUNTER
Called ins [phone]  ID: [id number]    We don't have her [facility] Ins card, I tried calling the # we have in the system and it will not take the ID # and I am unable to talk to a representative   I have tried calling the patient's ph# listed and cannot leave a message. I will try to call patient again later.

## 2020-01-13 ENCOUNTER — TELEPHONE (OUTPATIENT)
Dept: INTERNAL MEDICINE CLINIC | Age: 60
End: 2020-01-13

## 2020-01-13 NOTE — LETTER
[facility]  [address] [place] [address] [address] [id number]  Phone: [phone]  Fax: [phone]    Rosemary Dominguez MD        January 16, 2020    [address] [place] [address]      Dear Nam Lock: We have not been able to reach you by phone regarding your recent test results. Dr Sreedhar More reviewed your results and it shows Pulmonary nodule is actually decreased in size proving to be benign, probably an infectious or inflammatory process. Continue yearly screening CTs. If you have any questions or concerns, please don't hesitate to call.     Sincerely,        Rosemary Dominguez MD

## 2020-01-15 ENCOUNTER — TELEPHONE (OUTPATIENT)
Dept: INTERNAL MEDICINE CLINIC | Age: 60
End: 2020-01-15

## 2020-01-15 RX ORDER — HYDROCODONE BITARTRATE AND ACETAMINOPHEN 7.5; 325 MG/1; MG/1
1 TABLET ORAL EVERY 8 HOURS PRN
Qty: 90 TABLET | Refills: 0 | Status: SHIPPED | OUTPATIENT
Start: 2020-01-16 | End: 2020-02-18 | Stop reason: SDUPTHER

## 2020-01-15 NOTE — TELEPHONE ENCOUNTER
Pt has a 3 month controlled med f/u on 1-28 but she'll run out of her HYDROcodone-acetaminophen ([address]) 7.5-325 MG per tablet before then. Pt wants to know if dr Marylin Ledezma will refill the med for her since she has an appt scheduled? Pt uses [facility] in [place]. General

## 2020-01-28 ENCOUNTER — OFFICE VISIT (OUTPATIENT)
Dept: INTERNAL MEDICINE CLINIC | Age: 60
End: 2020-01-28
Payer: COMMERCIAL

## 2020-01-28 VITALS
DIASTOLIC BLOOD PRESSURE: 72 MMHG | WEIGHT: 138.6 LBS | RESPIRATION RATE: 16 BRPM | BODY MASS INDEX: 26.19 KG/M2 | SYSTOLIC BLOOD PRESSURE: 134 MMHG | OXYGEN SATURATION: 96 % | TEMPERATURE: 98.3 F | HEART RATE: 78 BPM

## 2020-01-28 PROCEDURE — 99214 OFFICE O/P EST MOD 30 MIN: CPT | Performed by: INTERNAL MEDICINE

## 2020-01-28 RX ORDER — ALBUTEROL SULFATE 90 UG/1
2 AEROSOL, METERED RESPIRATORY (INHALATION) EVERY 6 HOURS PRN
Qty: 1 INHALER | Refills: 3 | Status: SHIPPED | OUTPATIENT
Start: 2020-01-28 | End: 2022-03-15 | Stop reason: SDUPTHER

## 2020-01-28 NOTE — PROGRESS NOTES
activity change and unexpected weight change (Upset with her weight gain due to inactivity). Respiratory: Negative for cough, shortness of breath and wheezing. Cardiovascular: Negative for chest pain, palpitations and leg swelling. Gastrointestinal: Positive for constipation (Chronic and prior to her use of narcotics). Negative for abdominal pain, diarrhea, nausea and vomiting. Genitourinary: Negative for difficulty urinating and dysuria. Urinary incontinence   Musculoskeletal: Positive for back pain. Neurological: Negative for dizziness, light-headedness and headaches. Paresthesias down leg   Psychiatric/Behavioral: Negative for dysphoric mood and suicidal ideas. The patient is not nervous/anxious. Depression has been doing well       OBJECTIVE:  /72 (Site: Left Upper Arm, Position: Sitting, Cuff Size: Medium Adult)   Pulse 78   Temp 98.3 °F (36.8 °C) (Oral)   Resp 16   Wt 138 lb 9.6 oz (62.9 kg)   LMP  (LMP Unknown)   SpO2 96%   BMI 26.19 kg/m²      Body mass index is 26.19 kg/m². Physical Exam  Constitutional:       General: She is not in acute distress. Appearance: Normal appearance. She is well-developed. HENT:      Head: Normocephalic and atraumatic. Mouth/Throat:      Pharynx: Oropharynx is clear. Eyes:      Conjunctiva/sclera: Conjunctivae normal.   Neck:      Musculoskeletal: Normal range of motion and neck supple. Thyroid: No thyromegaly. Vascular: No carotid bruit. Comments: Thyromegaly not present  Cardiovascular:      Rate and Rhythm: Normal rate and regular rhythm. Heart sounds: Normal heart sounds. No murmur. No friction rub. No gallop. Pulmonary:      Effort: Pulmonary effort is normal.   Chest:      Chest wall: No tenderness. Abdominal:      General: There is no distension. Palpations: Abdomen is soft. Tenderness: There is no abdominal tenderness.       Comments: No hepatosplenomegaly   Musculoskeletal: 5    nicotine (NICOTROL) 10 MG inhaler     Sig: Inhale 1 puff into the lungs as needed for Smoking cessation     Dispense:  100 Inhaler     Refill:  1        Return in about 3 months (around 4/28/2020), or if symptoms worsen or fail to improve. Patient Instructions   Please call your pharmacy if you need any refills of your medication(s). Please call our office at [phone] if you don't hear from us about your test results. Bring an accurate list of your medications with you at every appointment to ensure that we have the correct information.     Our office hours are: Monday - Friday 8:30 am- 5 pm    Phone lines turn on at 8:30 am

## 2020-02-09 ASSESSMENT — ENCOUNTER SYMPTOMS
WHEEZING: 0
SHORTNESS OF BREATH: 0
VOMITING: 0
COUGH: 0
CONSTIPATION: 1
ABDOMINAL PAIN: 0
BACK PAIN: 1
NAUSEA: 0
DIARRHEA: 0

## 2020-02-18 RX ORDER — HYDROCODONE BITARTRATE AND ACETAMINOPHEN 7.5; 325 MG/1; MG/1
1 TABLET ORAL EVERY 8 HOURS PRN
Qty: 90 TABLET | Refills: 0 | Status: SHIPPED | OUTPATIENT
Start: 2020-02-18 | End: 2020-03-17 | Stop reason: SDUPTHER

## 2020-02-18 RX ORDER — HYDROCODONE BITARTRATE AND ACETAMINOPHEN 7.5; 325 MG/1; MG/1
1 TABLET ORAL EVERY 8 HOURS PRN
Qty: 90 TABLET | Refills: 0 | Status: SHIPPED | OUTPATIENT
Start: 2020-02-18 | End: 2020-02-18 | Stop reason: SDUPTHER

## 2020-02-18 NOTE — TELEPHONE ENCOUNTER
Patient requesting a medication refill.  BUT PT WAS ALSO WANTING A CALL BACK IN REGARDS TO THIS MATTER          Medication: HYDROcodone-acetaminophen ([facility]) 7.5-325 MG per tablet [[id number]              Pharmacy:  [place] [id number] [phone]              Last office visit:01/28/2020  Next office visit: Visit date not found

## 2020-02-18 NOTE — TELEPHONE ENCOUNTER
Pt advised. Aware Dr. Sreedhar Trujillo will try to escribe med again and if it does not go through aware she will need to pickup Rx.

## 2020-03-17 RX ORDER — HYDROCODONE BITARTRATE AND ACETAMINOPHEN 7.5; 325 MG/1; MG/1
1 TABLET ORAL EVERY 8 HOURS PRN
Qty: 90 TABLET | Refills: 0 | Status: SHIPPED | OUTPATIENT
Start: 2020-03-17 | End: 2020-04-17 | Stop reason: SDUPTHER

## 2020-04-13 RX ORDER — ATORVASTATIN CALCIUM 10 MG/1
TABLET, FILM COATED ORAL
Qty: 90 TABLET | Refills: 0 | Status: SHIPPED | OUTPATIENT
Start: 2020-04-13 | End: 2020-07-08

## 2020-04-13 NOTE — TELEPHONE ENCOUNTER
Due for visit at end of month  Can be video but is due for labs as she works at [employer] she should be able to get  Mail order to her / fax to [employer]  Already ordered

## 2020-04-17 ENCOUNTER — TELEPHONE (OUTPATIENT)
Dept: FAMILY MEDICINE CLINIC | Age: 60
End: 2020-04-17

## 2020-04-20 RX ORDER — HYDROCODONE BITARTRATE AND ACETAMINOPHEN 7.5; 325 MG/1; MG/1
1 TABLET ORAL EVERY 8 HOURS PRN
Qty: 90 TABLET | Refills: 0 | Status: SHIPPED | OUTPATIENT
Start: 2020-04-20 | End: 2020-05-20 | Stop reason: SDUPTHER

## 2020-04-21 NOTE — TELEPHONE ENCOUNTER
Not able to leave  @ [phone]    Per previous refill request message on 4/11/20:  Can schedule a video visit. Also due for blood work, does she want orders mailed to her or faxed to [address] [place] [address]?

## 2020-04-27 NOTE — TELEPHONE ENCOUNTER
Spoke with the patient to set up a VV. Her hrs at work have temporarily changed during the covid-19(8-5)  She will call back to set up a VV.

## 2020-05-20 ENCOUNTER — VIRTUAL VISIT (OUTPATIENT)
Dept: FAMILY MEDICINE CLINIC | Age: 60
End: 2020-05-20
Payer: COMMERCIAL

## 2020-05-20 PROCEDURE — 99442 PR PHYS/QHP TELEPHONE EVALUATION 11-20 MIN: CPT | Performed by: INTERNAL MEDICINE

## 2020-05-20 RX ORDER — HYDROCODONE BITARTRATE AND ACETAMINOPHEN 7.5; 325 MG/1; MG/1
1 TABLET ORAL EVERY 8 HOURS PRN
Qty: 90 TABLET | Refills: 0 | Status: SHIPPED | OUTPATIENT
Start: 2020-05-20 | End: 2020-06-22 | Stop reason: SDUPTHER

## 2020-06-08 RX ORDER — LISINOPRIL 40 MG/1
TABLET ORAL
Qty: 90 TABLET | Refills: 0 | Status: SHIPPED | OUTPATIENT
Start: 2020-06-08 | End: 2020-09-08 | Stop reason: SDUPTHER

## 2020-06-08 RX ORDER — SERTRALINE HYDROCHLORIDE 100 MG/1
TABLET, FILM COATED ORAL
Qty: 90 TABLET | Refills: 1 | Status: SHIPPED | OUTPATIENT
Start: 2020-06-08 | End: 2020-12-05

## 2020-06-14 ASSESSMENT — ENCOUNTER SYMPTOMS
COUGH: 0
BACK PAIN: 1
CONSTIPATION: 1
ABDOMINAL PAIN: 0
DIARRHEA: 0
SHORTNESS OF BREATH: 0
NAUSEA: 0
VOMITING: 0

## 2020-06-14 NOTE — PROGRESS NOTES
Steff Hutton is a 63 y.o. female evaluated via telephone on 5/20/2020. Consent:  She and/or health care decision maker is aware that that she may receive a bill for this telephone service, depending on her insurance coverage, and has provided verbal consent to proceed: Yes      Documentation:  I communicated with the patient and/or health care decision maker about chronic pain . Details of this discussion including any medical advice provided: see note below       I affirm this is a Patient Initiated Episode with a Patient who has not had a related appointment within my department in the past 7 days or scheduled within the next 24 hours. Patient identification was verified at the start of the visit: Yes    Total Time: minutes: 11-20 minutes    Note: not billable if this call serves to triage the patient into an appointment for the relevant concern      [address]:  Steff Hutton is a 63 y.o. female being evaluated for:    Chief Complaint   Patient presents with    Chronic Pain     3 mo f/u chronic pain       HPI   Low back pain has been worse with the rain we are having. Pain is now going down both legs previously was just the right. She was treated with an injection which helped but now the pain is seem to go down the left leg. There is no trouble urinating or burning with urination. She is also having some neck pain and right arm tingling. He is seeing both Dr. Kaleigh Yadav and Dr. Mi Negron for her back. Epidural steroids helped minimally. Probably needs surgery. Is not sure who she is going to follow-up with. Patient has COPD and continues to smoke. She has had a CT scan with and very small 1 mm nodule in the right upper lobe. She does have a history of melanoma repeat CAT scan was not enlarging    Hypertension which has been controlled.   History of depression which has been controlled much better   Allergies   Allergen Reactions    Avelox [Moxifloxacin] Swelling years: 25.50     Types: Cigarettes     Start date: 1/1/1985    Smokeless tobacco: Never Used    Tobacco comment: using nicotrol inhaler   Substance and Sexual Activity    Alcohol use: No    Drug use: No    Sexual activity: Yes     Partners: Male   Lifestyle    Physical activity     Days per week: Not on file     Minutes per session: Not on file    Stress: Not on file   Relationships    Social connections     Talks on phone: Not on file     Gets together: Not on file     Attends Jewish service: Not on file     Active member of club or organization: Not on file     Attends meetings of clubs or organizations: Not on file     Relationship status: Not on file    Intimate partner violence     Fear of current or ex partner: Not on file     Emotionally abused: Not on file     Physically abused: Not on file     Forced sexual activity: Not on file   Other Topics Concern    Not on file   Social History Narrative    Not on file      Past Medical History:   Diagnosis Date    Anxiety     Chronic back pain     Chronic pain     Depression     GERD (gastroesophageal reflux disease)     Lumbar disc disease     Migraine     Osteoarthritis     Sciatica of right side     Tobacco abuse     Unspecified essential hypertension      Past Surgical History:   Procedure Laterality Date    APPENDECTOMY      KNEE SURGERY Left     SHOULDER SURGERY Left     SKIN CANCER DESTRUCTION  02/2018    melanoma removed from right leg       Review of Systems   Constitutional: Positive for activity change. Respiratory: Negative for cough and shortness of breath. Cardiovascular: Negative for chest pain, palpitations and leg swelling. Gastrointestinal: Positive for constipation (Chronic and prior to her use of narcotics). Negative for abdominal pain, diarrhea, nausea and vomiting. Genitourinary: Negative for difficulty urinating and dysuria. Urinary incontinence   Musculoskeletal: Positive for back pain. Neurological: Negative for dizziness, light-headedness and headaches. Paresthesias down legs   Psychiatric/Behavioral: Negative for dysphoric mood. The patient is not nervous/anxious. Depression has been doing well       OBJECTIVE:  LMP  (LMP Unknown)   Breastfeeding No      There is no height or weight on file to calculate BMI. Vitals not provided    Physical Exam comfortable on the phone. No wheezing or shortness of breath answers all questions    ASSESSMENT/PLAN:    Miguelina Velasquez was seen today for chronic pain. Diagnoses and all orders for this visit:    Chronic pain syndrome OAR[facility] was reviewed, on chronic narcotics at some point really does need do something with her back  -     HYDROcodone-acetaminophen (NORCO) 7.5-325 MG per tablet; Take 1 tablet by mouth every 8 hours as needed for Pain for up to 30 days. DDD (degenerative disc disease), lumbar  -     HYDROcodone-acetaminophen (NORCO) 7.5-325 MG per tablet; Take 1 tablet by mouth every 8 hours as needed for Pain for up to 30 days. Chronic obstructive pulmonary disease, unspecified COPD type ([facility] ambulatory nodule repeat in a year. Breathing is stable  -     fluticasone-umeclidin-vilant (Alex [place]) 100-62.5-25 MCG/INH AEPB; Inhale 1 puff into the lungs daily        Orders Placed This Encounter   Medications    HYDROcodone-acetaminophen (NORCO) 7.5-325 MG per tablet     Sig: Take 1 tablet by mouth every 8 hours as needed for Pain for up to 30 days. Dispense:  90 tablet     Refill:  0     Reduce doses taken as pain becomes manageable    fluticasone-umeclidin-vilant (TRELEGY ELLIPTA) 100-62.5-25 MCG/INH AEPB     Sig: Inhale 1 puff into the lungs daily     Dispense:  1 each     Refill:  5        Return in about 3 months (around 8/20/2020), or if symptoms worsen or fail to improve. There are no Patient Instructions on file for this visit.

## 2020-06-22 RX ORDER — HYDROCODONE BITARTRATE AND ACETAMINOPHEN 7.5; 325 MG/1; MG/1
1 TABLET ORAL EVERY 8 HOURS PRN
Qty: 90 TABLET | Refills: 0 | Status: SHIPPED | OUTPATIENT
Start: 2020-06-22 | End: 2020-07-22 | Stop reason: SDUPTHER

## 2020-06-23 ENCOUNTER — TELEPHONE (OUTPATIENT)
Dept: FAMILY MEDICINE CLINIC | Age: 60
End: 2020-06-23

## 2020-06-23 RX ORDER — NITROFURANTOIN 25; 75 MG/1; MG/1
100 CAPSULE ORAL 2 TIMES DAILY
Qty: 14 CAPSULE | Refills: 0 | Status: SHIPPED | OUTPATIENT
Start: 2020-06-23 | End: 2020-06-30

## 2020-06-23 NOTE — TELEPHONE ENCOUNTER
Patient feels like she has a UTI, urgency, pressure, back pain and burning. Can something be called into [facility].         Please call,  [phone] cell [phone] work

## 2020-06-23 NOTE — TELEPHONE ENCOUNTER
Can call in antibiotics but will also send an order for urinalysis and culture if persisting needs to get done.   I will fax order to [facility]

## 2020-07-08 RX ORDER — ATORVASTATIN CALCIUM 10 MG/1
TABLET, FILM COATED ORAL
Qty: 90 TABLET | Refills: 0 | Status: SHIPPED | OUTPATIENT
Start: 2020-07-08 | End: 2020-09-08 | Stop reason: SDUPTHER

## 2020-07-15 DIAGNOSIS — M51.36 DDD (DEGENERATIVE DISC DISEASE), LUMBAR: ICD-10-CM

## 2020-07-15 DIAGNOSIS — I10 ESSENTIAL HYPERTENSION: ICD-10-CM

## 2020-07-15 DIAGNOSIS — E78.00 HYPERCHOLESTEREMIA: ICD-10-CM

## 2020-07-15 DIAGNOSIS — Z79.899 MEDICATION MANAGEMENT: ICD-10-CM

## 2020-07-16 ENCOUNTER — TELEPHONE (OUTPATIENT)
Dept: FAMILY MEDICINE CLINIC | Age: 60
End: 2020-07-16

## 2020-07-16 NOTE — TELEPHONE ENCOUNTER
----- Message from Reina Albrecht MD sent at 7/15/2020  6:43 PM EDT -----  Regarding: Laboratory values  Labs reveal normal kidney function, liver function glucose and blood count. Cholesterol is high at 251 with an LDL of 142 and triglycerides of 343. This is going up. We could increase your Lipitor up to 20 mg daily and recheck in 4 months? .  Please let me know what she wants to do

## 2020-07-22 RX ORDER — HYDROCODONE BITARTRATE AND ACETAMINOPHEN 7.5; 325 MG/1; MG/1
1 TABLET ORAL EVERY 8 HOURS PRN
Qty: 90 TABLET | Refills: 0 | Status: SHIPPED | OUTPATIENT
Start: 2020-07-22 | End: 2020-08-19 | Stop reason: SDUPTHER

## 2020-07-22 NOTE — TELEPHONE ENCOUNTER
Patient calling for refill on Hydrocodone-acetaminophen to [facility] [place].       Please advise, [phone]

## 2020-07-31 NOTE — TELEPHONE ENCOUNTER
Message states \"the person you are calling is unavailable right now, try your call later\" , Unable to lm

## 2020-08-19 ENCOUNTER — TELEPHONE (OUTPATIENT)
Dept: FAMILY MEDICINE CLINIC | Age: 60
End: 2020-08-19

## 2020-08-19 RX ORDER — HYDROCODONE BITARTRATE AND ACETAMINOPHEN 7.5; 325 MG/1; MG/1
1 TABLET ORAL EVERY 8 HOURS PRN
Qty: 90 TABLET | Refills: 0 | Status: SHIPPED | OUTPATIENT
Start: 2020-08-19 | End: 2020-09-23 | Stop reason: SDUPTHER

## 2020-08-24 ENCOUNTER — OFFICE VISIT (OUTPATIENT)
Dept: FAMILY MEDICINE CLINIC | Age: 60
End: 2020-08-24
Payer: COMMERCIAL

## 2020-08-24 VITALS
RESPIRATION RATE: 16 BRPM | WEIGHT: 137.4 LBS | TEMPERATURE: 97.3 F | DIASTOLIC BLOOD PRESSURE: 68 MMHG | SYSTOLIC BLOOD PRESSURE: 102 MMHG | OXYGEN SATURATION: 98 % | HEART RATE: 93 BPM | BODY MASS INDEX: 25.96 KG/M2

## 2020-08-24 PROCEDURE — 99213 OFFICE O/P EST LOW 20 MIN: CPT | Performed by: INTERNAL MEDICINE

## 2020-08-24 ASSESSMENT — ENCOUNTER SYMPTOMS
ABDOMINAL PAIN: 0
CONSTIPATION: 0
DIARRHEA: 0
SHORTNESS OF BREATH: 1
BACK PAIN: 1
WHEEZING: 0
BLOOD IN STOOL: 0
NAUSEA: 0
COUGH: 1
VOMITING: 0

## 2020-08-24 NOTE — PROGRESS NOTES
SUBJECTIVE:  Claudia Almanza is a 63 y.o. female being evaluated for:    Chief Complaint   Patient presents with    Hyperlipidemia    Chronic Pain     3 month follow up for med refills       HPI   Patient chronic low back pain and disc disease  Just saw Dr Kelsie Jacksno recently via video  Doing epidurals and helping  Pain still down both legs right and left at times  Leg feels wobbly at times  Dr Kelsie Jackson thinks she needs surgery  Patient keeps putting it off   Allergies   Allergen Reactions    Avelox [Moxifloxacin] Swelling and Rash    Sulfa Antibiotics Swelling and Rash    Chantix [Varenicline] Other (See Comments)     irritability and worsening depression     Codeine Nausea Only     Current Outpatient Medications   Medication Sig Dispense Refill    HYDROcodone-acetaminophen (NORCO) 7.5-325 MG per tablet Take 1 tablet by mouth every 8 hours as needed for Pain for up to 30 days. 90 tablet 0    atorvastatin (LIPITOR) 10 MG tablet TAKE 1 TABLET BY MOUTH DAILY 90 tablet 0    sertraline (ZOLOFT) 100 MG tablet TAKE 1 TABLET BY MOUTH EVERY DAY 90 tablet 1    lisinopril (PRINIVIL;ZESTRIL) 40 MG tablet TAKE 1 TABLET BY MOUTH EVERY DAY 90 tablet 0    fluticasone-umeclidin-vilant (TRELEGY ELLIPTA) 100-62.5-25 MCG/INH AEPB Inhale 1 puff into the lungs daily 1 each 5    albuterol sulfate HFA (PROVENTIL HFA) 108 (90 Base) MCG/ACT inhaler Inhale 2 puffs into the lungs every 6 hours as needed for Wheezing 1 Inhaler 3    nicotine (NICOTROL) 10 MG inhaler Inhale 1 puff into the lungs as needed for Smoking cessation 100 Inhaler 1    ASPIRIN LOW DOSE 81 MG EC tablet TAKE 1 TABLET BY MOUTH DAILY 30 tablet 5    fluticasone (FLONASE) 50 MCG/ACT nasal spray 2 sprays by Each Nare route daily 3 Bottle 1    omeprazole (PRILOSEC) 20 MG delayed release capsule Take 1 capsule by mouth daily (with breakfast) 30 capsule 5     No current facility-administered medications for this visit.           Social History     Socioeconomic History  Marital status:      Spouse name: Not on file    Number of children: Not on file    Years of education: Not on file    Highest education level: Not on file   Occupational History    Not on file   Social Needs    Financial resource strain: Not on file    Food insecurity     Worry: Not on file     Inability: Not on file    Transportation needs     Medical: Not on file     Non-medical: Not on file   Tobacco Use    Smoking status: Current Every Day Smoker     Packs/day: 0.75     Years: 34.00     Pack years: 25.50     Types: Cigarettes     Start date: 1/1/1985    Smokeless tobacco: Never Used    Tobacco comment: using nicotrol inhaler   Substance and Sexual Activity    Alcohol use: No    Drug use: No    Sexual activity: Yes     Partners: Male   Lifestyle    Physical activity     Days per week: Not on file     Minutes per session: Not on file    Stress: Not on file   Relationships    Social connections     Talks on phone: Not on file     Gets together: Not on file     Attends Methodist service: Not on file     Active member of club or organization: Not on file     Attends meetings of clubs or organizations: Not on file     Relationship status: Not on file    Intimate partner violence     Fear of current or ex partner: Not on file     Emotionally abused: Not on file     Physically abused: Not on file     Forced sexual activity: Not on file   Other Topics Concern    Not on file   Social History Narrative    Not on file      Past Medical History:   Diagnosis Date    Anxiety     Chronic back pain     Chronic pain     Depression     GERD (gastroesophageal reflux disease)     Hyperlipidemia     Lumbar disc disease     Migraine     Osteoarthritis     Sciatica of right side     Tobacco abuse     Unspecified essential hypertension      Past Surgical History:   Procedure Laterality Date    APPENDECTOMY      KNEE SURGERY Left     SHOULDER SURGERY Left     SKIN CANCER DESTRUCTION 02/2018    melanoma removed from right leg       Review of Systems   Constitutional: Negative for activity change, fatigue and unexpected weight change. HENT: Negative for nosebleeds. Eyes: Negative for visual disturbance. Respiratory: Positive for cough (no change ) and shortness of breath. Negative for wheezing. Cardiovascular: Negative for chest pain, palpitations and leg swelling. Gastrointestinal: Negative for abdominal pain, blood in stool, constipation, diarrhea, nausea and vomiting. Genitourinary: Negative for dysuria. Musculoskeletal: Positive for arthralgias (arthritis in hands ) and back pain. Negative for gait problem. Neurological: Negative for dizziness, light-headedness and headaches. Psychiatric/Behavioral: Negative for dysphoric mood. OBJECTIVE:  /68 (Site: Left Upper Arm, Position: Sitting, Cuff Size: Medium Adult)   Pulse 93   Temp 97.3 °F (36.3 °C) (Oral)   Resp 16   Wt 137 lb 6.4 oz (62.3 kg)   LMP  (LMP Unknown)   SpO2 98%   BMI 25.96 kg/m²      Body mass index is 25.96 kg/m². Physical Exam  Constitutional:       General: She is not in acute distress. Appearance: Normal appearance. She is well-developed. HENT:      Head: Normocephalic and atraumatic. Mouth/Throat:      Pharynx: Oropharynx is clear. Eyes:      Conjunctiva/sclera: Conjunctivae normal.   Neck:      Musculoskeletal: Normal range of motion and neck supple. Thyroid: No thyromegaly. Vascular: No carotid bruit. Comments: Thyromegaly not present  Cardiovascular:      Rate and Rhythm: Normal rate and regular rhythm. Heart sounds: Normal heart sounds. No murmur. No friction rub. No gallop. Pulmonary:      Effort: Pulmonary effort is normal.   Chest:      Chest wall: No tenderness. Abdominal:      General: There is no distension. Palpations: Abdomen is soft. Tenderness: There is no abdominal tenderness.       Comments: No hepatosplenomegaly Musculoskeletal:         General: Tenderness (Right paraspinal) present. No swelling. Lymphadenopathy:      Cervical: No cervical adenopathy. Skin:     General: Skin is warm and dry. Neurological:      General: No focal deficit present. Mental Status: She is alert. Gait: Gait normal.   Psychiatric:         Behavior: Behavior normal.         Thought Content: Thought content normal.         ASSESSMENT/PLAN:    Wesley muniz was seen today for hyperlipidemia and chronic pain. Diagnoses and all orders for this visit:    Chronic pain syndrome reviewed oarrs  Discussed as now getting epidurals to lower dose to 5 mg with next visit  Considering surgery with [facility]     DDD (degenerative disc disease), lumbar    Essential hypertension controlled and normal labs in July     Borderline high cholesterol  To watch diet more     Smoker discussed cessation  Meds with proles          No orders of the defined types were placed in this encounter. Return in about 3 months (around 11/24/2020), or if symptoms worsen or fail to improve. There are no Patient Instructions on file for this visit.

## 2020-09-08 RX ORDER — LISINOPRIL 40 MG/1
TABLET ORAL
Qty: 90 TABLET | Refills: 1 | Status: SHIPPED | OUTPATIENT
Start: 2020-09-08 | End: 2021-02-16 | Stop reason: SDUPTHER

## 2020-09-08 RX ORDER — ATORVASTATIN CALCIUM 10 MG/1
TABLET, FILM COATED ORAL
Qty: 90 TABLET | Refills: 1 | Status: SHIPPED | OUTPATIENT
Start: 2020-09-08 | End: 2021-02-16

## 2020-09-23 ENCOUNTER — TELEPHONE (OUTPATIENT)
Dept: FAMILY MEDICINE CLINIC | Age: 60
End: 2020-09-23

## 2020-09-23 RX ORDER — HYDROCODONE BITARTRATE AND ACETAMINOPHEN 7.5; 325 MG/1; MG/1
1 TABLET ORAL EVERY 8 HOURS PRN
Qty: 90 TABLET | Refills: 0 | Status: SHIPPED | OUTPATIENT
Start: 2020-09-23 | End: 2020-10-22 | Stop reason: SDUPTHER

## 2020-10-22 ENCOUNTER — TELEPHONE (OUTPATIENT)
Dept: FAMILY MEDICINE CLINIC | Age: 60
End: 2020-10-22

## 2020-10-22 RX ORDER — HYDROCODONE BITARTRATE AND ACETAMINOPHEN 7.5; 325 MG/1; MG/1
1 TABLET ORAL EVERY 8 HOURS PRN
Qty: 90 TABLET | Refills: 0 | Status: SHIPPED | OUTPATIENT
Start: 2020-10-22 | End: 2020-11-16 | Stop reason: SDUPTHER

## 2020-10-22 NOTE — TELEPHONE ENCOUNTER
Pt is calling for a refill on   HYDROcodone-acetaminophen (Meghan Moore) 7.5-325 MG per tablet     [facility] [id number] [facility] [address] - [address] [facility]

## 2020-11-16 ENCOUNTER — OFFICE VISIT (OUTPATIENT)
Dept: FAMILY MEDICINE CLINIC | Age: 60
End: 2020-11-16
Payer: COMMERCIAL

## 2020-11-16 VITALS
WEIGHT: 136.8 LBS | DIASTOLIC BLOOD PRESSURE: 70 MMHG | HEART RATE: 89 BPM | TEMPERATURE: 97.6 F | HEIGHT: 61 IN | RESPIRATION RATE: 16 BRPM | BODY MASS INDEX: 25.83 KG/M2 | OXYGEN SATURATION: 98 % | SYSTOLIC BLOOD PRESSURE: 110 MMHG

## 2020-11-16 PROCEDURE — 99213 OFFICE O/P EST LOW 20 MIN: CPT | Performed by: INTERNAL MEDICINE

## 2020-11-16 RX ORDER — HYDROCODONE BITARTRATE AND ACETAMINOPHEN 7.5; 325 MG/1; MG/1
1 TABLET ORAL EVERY 8 HOURS PRN
Qty: 90 TABLET | Refills: 0 | Status: SHIPPED | OUTPATIENT
Start: 2020-11-19 | End: 2020-12-21 | Stop reason: SDUPTHER

## 2020-11-16 RX ORDER — INDOMETHACIN 50 MG/1
50 CAPSULE ORAL
Qty: 90 CAPSULE | Refills: 0 | Status: SHIPPED | OUTPATIENT
Start: 2020-11-16 | End: 2020-12-07

## 2020-11-16 RX ORDER — TIZANIDINE 4 MG/1
4 TABLET ORAL 3 TIMES DAILY PRN
Qty: 30 TABLET | Refills: 0 | Status: SHIPPED | OUTPATIENT
Start: 2020-11-16 | End: 2021-02-16 | Stop reason: SDUPTHER

## 2020-11-16 NOTE — PROGRESS NOTES
SUBJECTIVE:  Char Wang is a 63 y.o. female being evaluated for:    Chief Complaint   Patient presents with    Chronic Pain     Patient here for a 3 month follow up on chronic pain for refills on pain meds. HPI   Back is the same  Getting injections from Dr Zakia Andres and new injections did help  Went 5 months and wearing off   TO call and reschedule  No pain shooting down her leg  Pain in her left hip  NO falls or trauma       Allergies   Allergen Reactions    Avelox [Moxifloxacin] Swelling and Rash    Sulfa Antibiotics Swelling and Rash    Chantix [Varenicline] Other (See Comments)     irritability and worsening depression     Codeine Nausea Only     Current Outpatient Medications   Medication Sig Dispense Refill    HYDROcodone-acetaminophen (NORCO) 7.5-325 MG per tablet Take 1 tablet by mouth every 8 hours as needed for Pain for up to 30 days.  90 tablet 0    indomethacin (INDOCIN) 50 MG capsule Take 1 capsule by mouth 3 times daily (with meals) 90 capsule 0    tiZANidine (ZANAFLEX) 4 MG tablet Take 1 tablet by mouth 3 times daily as needed (muscle spasm) 1/2 to 1 tablet tid prn 30 tablet 0    lisinopril (PRINIVIL;ZESTRIL) 40 MG tablet TAKE 1 TABLET BY MOUTH EVERY DAY 90 tablet 1    atorvastatin (LIPITOR) 10 MG tablet TAKE 1 TABLET BY MOUTH DAILY 90 tablet 1    sertraline (ZOLOFT) 100 MG tablet TAKE 1 TABLET BY MOUTH EVERY DAY 90 tablet 1    fluticasone-umeclidin-vilant (TRELEGY ELLIPTA) 100-62.5-25 MCG/INH AEPB Inhale 1 puff into the lungs daily 1 each 5    albuterol sulfate HFA (PROVENTIL HFA) 108 (90 Base) MCG/ACT inhaler Inhale 2 puffs into the lungs every 6 hours as needed for Wheezing 1 Inhaler 3    ASPIRIN LOW DOSE 81 MG EC tablet TAKE 1 TABLET BY MOUTH DAILY 30 tablet 5    fluticasone (FLONASE) 50 MCG/ACT nasal spray 2 sprays by Each Nare route daily 3 Bottle 1    omeprazole (PRILOSEC) 20 MG delayed release capsule Take 1 capsule by mouth daily (with breakfast) 30 capsule 5    nicotine (NICOTROL) 10 MG inhaler Inhale 1 puff into the lungs as needed for Smoking cessation (Patient not taking: Reported on 11/16/2020) 100 Inhaler 1     No current facility-administered medications for this visit.           Social History     Socioeconomic History    Marital status:      Spouse name: Not on file    Number of children: Not on file    Years of education: Not on file    Highest education level: Not on file   Occupational History    Not on file   Social Needs    Financial resource strain: Not on file    Food insecurity     Worry: Not on file     Inability: Not on file    Transportation needs     Medical: Not on file     Non-medical: Not on file   Tobacco Use    Smoking status: Current Every Day Smoker     Packs/day: 0.75     Years: 34.00     Pack years: 25.50     Types: Cigarettes     Start date: 1/1/1985    Smokeless tobacco: Never Used    Tobacco comment: has nicotrol inhaler but ot using   Substance and Sexual Activity    Alcohol use: No    Drug use: No    Sexual activity: Yes     Partners: Male   Lifestyle    Physical activity     Days per week: Not on file     Minutes per session: Not on file    Stress: Not on file   Relationships    Social connections     Talks on phone: Not on file     Gets together: Not on file     Attends Rastafarian service: Not on file     Active member of club or organization: Not on file     Attends meetings of clubs or organizations: Not on file     Relationship status: Not on file    Intimate partner violence     Fear of current or ex partner: Not on file     Emotionally abused: Not on file     Physically abused: Not on file     Forced sexual activity: Not on file   Other Topics Concern    Not on file   Social History Narrative    Not on file      Past Medical History:   Diagnosis Date    Anxiety     Chronic back pain     Chronic pain     Depression     GERD (gastroesophageal reflux disease)     Hyperlipidemia     Lumbar disc disease  Migraine     Osteoarthritis     Sciatica of right side     Tobacco abuse     Unspecified essential hypertension      Past Surgical History:   Procedure Laterality Date    APPENDECTOMY      KNEE SURGERY Left     SHOULDER SURGERY Left     SKIN CANCER DESTRUCTION  02/2018    melanoma removed from right leg       Review of Systems   Constitutional: Negative for activity change and fatigue. HENT: Negative for nosebleeds. Respiratory: Positive for cough (no change ) and shortness of breath. Negative for wheezing. Cardiovascular: Negative for chest pain, palpitations and leg swelling. Gastrointestinal: Negative for abdominal pain, blood in stool, constipation, diarrhea, nausea and vomiting. Genitourinary: Negative for difficulty urinating and dysuria. Musculoskeletal: Positive for arthralgias and back pain. Negative for gait problem. Neurological: Negative for dizziness, light-headedness and headaches. No sciatica    Psychiatric/Behavioral: Negative for dysphoric mood. OBJECTIVE:  /70 (Site: Right Upper Arm, Position: Sitting, Cuff Size: Medium Adult)   Pulse 89   Temp 97.6 °F (36.4 °C) (Oral)   Resp 16   Ht 5' 1\" (1.549 m)   Wt 136 lb 12.8 oz (62.1 kg)   LMP  (LMP Unknown)   SpO2 98%   BMI 25.85 kg/m²      Body mass index is 25.85 kg/m². Physical Exam  Constitutional:       General: She is not in acute distress. Appearance: Normal appearance. She is well-developed. HENT:      Head: Normocephalic and atraumatic. Mouth/Throat:      Pharynx: Oropharynx is clear. Eyes:      Conjunctiva/sclera: Conjunctivae normal.   Neck:      Musculoskeletal: Normal range of motion and neck supple. Thyroid: No thyromegaly. Vascular: No carotid bruit. Comments: Thyromegaly not present  Cardiovascular:      Rate and Rhythm: Normal rate and regular rhythm. Heart sounds: Normal heart sounds. No murmur. No friction rub. No gallop.     Pulmonary: Effort: Pulmonary effort is normal.   Chest:      Chest wall: No tenderness. Abdominal:      General: There is no distension. Palpations: Abdomen is soft. Tenderness: There is no abdominal tenderness. Comments: No hepatosplenomegaly   Musculoskeletal:         General: Tenderness (Right paraspinal) present. No swelling. Lymphadenopathy:      Cervical: No cervical adenopathy. Skin:     General: Skin is warm and dry. Neurological:      General: No focal deficit present. Mental Status: She is alert. Gait: Gait normal.   Psychiatric:         Behavior: Behavior normal.         Thought Content: Thought content normal.         ASSESSMENT/PLAN:    Jerri Goldman was seen today for chronic pain. Diagnoses and all orders for this visit:    Chronic pain syndrome reviewed oarrs  Pain form completed   -     HYDROcodone-acetaminophen (NORCO) 7.5-325 MG per tablet; Take 1 tablet by mouth every 8 hours as needed for Pain for up to 30 days. DDD (degenerative disc disease), lumbar  -     HYDROcodone-acetaminophen (NORCO) 7.5-325 MG per tablet; Take 1 tablet by mouth every 8 hours as needed for Pain for up to 30 days. -     indomethacin (INDOCIN) 50 MG capsule; Take 1 capsule by mouth 3 times daily (with meals)  -     tiZANidine (ZANAFLEX) 4 MG tablet; Take 1 tablet by mouth 3 times daily as needed (muscle spasm) 1/2 to 1 tablet tid prn      Now seeing [facility] and injection therapy   Orders Placed This Encounter   Medications    HYDROcodone-acetaminophen (NORCO) 7.5-325 MG per tablet     Sig: Take 1 tablet by mouth every 8 hours as needed for Pain for up to 30 days.      Dispense:  90 tablet     Refill:  0     Reduce doses taken as pain becomes manageable    indomethacin (INDOCIN) 50 MG capsule     Sig: Take 1 capsule by mouth 3 times daily (with meals)     Dispense:  90 capsule     Refill:  0    tiZANidine (ZANAFLEX) 4 MG tablet     Sig: Take 1 tablet by mouth 3 times daily as needed (muscle spasm) 1/2 to 1 tablet tid prn     Dispense:  30 tablet     Refill:  0        Return in about 3 months (around 2/16/2021), or if symptoms worsen or fail to improve, for Physical with PAP . There are no Patient Instructions on file for this visit.

## 2020-11-27 ASSESSMENT — ENCOUNTER SYMPTOMS
ABDOMINAL PAIN: 0
BACK PAIN: 1
COUGH: 1
NAUSEA: 0
BLOOD IN STOOL: 0
SHORTNESS OF BREATH: 1
VOMITING: 0
WHEEZING: 0
DIARRHEA: 0
CONSTIPATION: 0

## 2020-12-05 RX ORDER — SERTRALINE HYDROCHLORIDE 100 MG/1
TABLET, FILM COATED ORAL
Qty: 90 TABLET | Refills: 1 | Status: SHIPPED | OUTPATIENT
Start: 2020-12-05 | End: 2021-05-27

## 2020-12-08 RX ORDER — INDOMETHACIN 50 MG/1
CAPSULE ORAL
Qty: 90 CAPSULE | Refills: 1 | Status: SHIPPED | OUTPATIENT
Start: 2020-12-08 | End: 2021-02-16

## 2020-12-21 ENCOUNTER — TELEPHONE (OUTPATIENT)
Dept: FAMILY MEDICINE CLINIC | Age: 60
End: 2020-12-21

## 2020-12-21 RX ORDER — HYDROCODONE BITARTRATE AND ACETAMINOPHEN 7.5; 325 MG/1; MG/1
1 TABLET ORAL EVERY 8 HOURS PRN
Qty: 90 TABLET | Refills: 0 | Status: SHIPPED | OUTPATIENT
Start: 2020-12-21 | End: 2021-01-20 | Stop reason: SDUPTHER

## 2020-12-21 NOTE — TELEPHONE ENCOUNTER
Pt is needing a refill on   [facility] [address] [facility], [facility] [address] [facility]

## 2021-01-20 ENCOUNTER — TELEPHONE (OUTPATIENT)
Dept: FAMILY MEDICINE CLINIC | Age: 61
End: 2021-01-20

## 2021-01-20 DIAGNOSIS — M51.36 DDD (DEGENERATIVE DISC DISEASE), LUMBAR: ICD-10-CM

## 2021-01-20 DIAGNOSIS — G89.4 CHRONIC PAIN SYNDROME: ICD-10-CM

## 2021-01-20 RX ORDER — HYDROCODONE BITARTRATE AND ACETAMINOPHEN 7.5; 325 MG/1; MG/1
1 TABLET ORAL EVERY 8 HOURS PRN
Qty: 90 TABLET | Refills: 0 | Status: SHIPPED | OUTPATIENT
Start: 2021-01-20 | End: 2021-02-16 | Stop reason: SDUPTHER

## 2021-01-20 NOTE — TELEPHONE ENCOUNTER
Pt is calling for a refill on   HYDROcodone-acetaminophen ([address] [place] [address]) 7.5-325 MG per tablet     Pinkie Harada [address] [facility] [address] - [address] [facility]   10 [facility], [address] [place] [id number]   Phone:  [phone]  Fax:  [phone]

## 2021-02-16 ENCOUNTER — OFFICE VISIT (OUTPATIENT)
Dept: FAMILY MEDICINE CLINIC | Age: 61
End: 2021-02-16
Payer: COMMERCIAL

## 2021-02-16 VITALS
WEIGHT: 140 LBS | HEART RATE: 93 BPM | HEIGHT: 61 IN | BODY MASS INDEX: 26.43 KG/M2 | OXYGEN SATURATION: 97 % | SYSTOLIC BLOOD PRESSURE: 112 MMHG | DIASTOLIC BLOOD PRESSURE: 68 MMHG | TEMPERATURE: 97.7 F

## 2021-02-16 DIAGNOSIS — I10 ESSENTIAL HYPERTENSION: ICD-10-CM

## 2021-02-16 DIAGNOSIS — F17.200 SMOKER: ICD-10-CM

## 2021-02-16 DIAGNOSIS — C43.71 MALIGNANT MELANOMA OF RIGHT LOWER EXTREMITY INCLUDING HIP (HCC): ICD-10-CM

## 2021-02-16 DIAGNOSIS — K21.9 GASTROESOPHAGEAL REFLUX DISEASE, UNSPECIFIED WHETHER ESOPHAGITIS PRESENT: ICD-10-CM

## 2021-02-16 DIAGNOSIS — M50.90 CERVICAL DISC DISEASE: ICD-10-CM

## 2021-02-16 DIAGNOSIS — J44.9 CHRONIC OBSTRUCTIVE PULMONARY DISEASE, UNSPECIFIED COPD TYPE (HCC): ICD-10-CM

## 2021-02-16 DIAGNOSIS — M51.36 DDD (DEGENERATIVE DISC DISEASE), LUMBAR: ICD-10-CM

## 2021-02-16 DIAGNOSIS — G89.4 CHRONIC PAIN SYNDROME: ICD-10-CM

## 2021-02-16 DIAGNOSIS — Z12.4 CERVICAL CANCER SCREENING: ICD-10-CM

## 2021-02-16 DIAGNOSIS — Z00.00 ROUTINE GENERAL MEDICAL EXAMINATION AT A HEALTH CARE FACILITY: Primary | ICD-10-CM

## 2021-02-16 PROCEDURE — 99396 PREV VISIT EST AGE 40-64: CPT | Performed by: INTERNAL MEDICINE

## 2021-02-16 PROCEDURE — G8484 FLU IMMUNIZE NO ADMIN: HCPCS | Performed by: INTERNAL MEDICINE

## 2021-02-16 RX ORDER — HYDROCODONE BITARTRATE AND ACETAMINOPHEN 7.5; 325 MG/1; MG/1
1 TABLET ORAL EVERY 8 HOURS PRN
Qty: 90 TABLET | Refills: 0 | Status: SHIPPED | OUTPATIENT
Start: 2021-02-16 | End: 2021-03-16 | Stop reason: SDUPTHER

## 2021-02-16 RX ORDER — LISINOPRIL 40 MG/1
TABLET ORAL
Qty: 90 TABLET | Refills: 1 | Status: SHIPPED | OUTPATIENT
Start: 2021-02-16 | End: 2021-03-04

## 2021-02-16 RX ORDER — METHYLPREDNISOLONE 4 MG/1
TABLET ORAL
Qty: 1 KIT | Refills: 0 | Status: SHIPPED | OUTPATIENT
Start: 2021-02-16 | End: 2021-02-22

## 2021-02-16 RX ORDER — TIZANIDINE 4 MG/1
TABLET ORAL
Qty: 90 TABLET | Refills: 1 | Status: SHIPPED | OUTPATIENT
Start: 2021-02-16 | End: 2021-09-13 | Stop reason: SDUPTHER

## 2021-02-16 RX ORDER — OMEPRAZOLE 20 MG/1
20 CAPSULE, DELAYED RELEASE ORAL
Qty: 90 CAPSULE | Refills: 5 | Status: SHIPPED
Start: 2021-02-16 | End: 2021-06-01 | Stop reason: SINTOL

## 2021-02-16 ASSESSMENT — ENCOUNTER SYMPTOMS
ABDOMINAL PAIN: 0
BACK PAIN: 1
CONSTIPATION: 0
DIARRHEA: 0
COUGH: 0
VOMITING: 0
SHORTNESS OF BREATH: 0
BLOOD IN STOOL: 0
NAUSEA: 0
TROUBLE SWALLOWING: 0
SINUS PRESSURE: 0

## 2021-02-16 NOTE — PATIENT INSTRUCTIONS
Please call your pharmacy if you need any refills of your medication(s). Please call our office at [phone] if you don't hear from us about your test results. Bring an accurate list of your medications with you at every appointment to ensure that we have the correct information. Our office hours are: Monday - Friday 8:30 am- 5 pm    Phone lines turn on at 8:30 am      Patient Education        Cervical Disc Disease: Care Instructions  Your Care Instructions     Cervical disc disease results from damage, disease, or wear and tear to the discs between the bones (vertebra) in your neck. The discs act as shock absorbers for the spine and keep the spine flexible. When a disc is damaged, it can bulge out and press against the nerve roots or spinal cord. This is sometimes called a herniated or \"slipped disc. \" This pressure can cause pain and numbness or tingling in your arms and hands. It can also cause weakness in your legs. An accident can damage a disc and cause it to break open (rupture). Aging and hard physical work can also cause damage to cervical discs. The first treatments for cervical disc disease include physical therapy, special neck exercises, heat, and pain medicine. If these fail, your doctor may inject steroids and pain medicine into your neck. Surgery is usually done only if other treatments have not worked. Follow-up care is a key part of your treatment and safety. Be sure to make and go to all appointments, and call your doctor if you are having problems. It's also a good idea to know your test results and keep a list of the medicines you take. How can you care for yourself at home? · Take pain medicines exactly as directed. ? If the doctor gave you a prescription medicine for pain, take it as prescribed. ? If you are not taking a prescription pain medicine, ask your doctor if you can take an over-the-counter medicine. · Don't spend too long in one position. Take short breaks to move around and change positions. · Wear a seat belt and shoulder harness when you are in a car. · Sleep with a pillow under your head and neck that keeps your neck straight. · Follow your doctor's instructions for gentle neck-stretching exercises. · Do not smoke. Smoking can slow healing of your discs. If you need help quitting, talk to your doctor about stop-smoking programs and medicines. These can increase your chances of quitting for good. · Avoid strenuous work or exercise until your doctor says it is okay. When should you call for help? Call 911 anytime you think you may need emergency care. For example, call if:    · You are unable to move an arm or a leg at all. Call your doctor now or seek immediate medical care if:    · You have new or worse symptoms in your arms, legs, belly, or buttocks. Symptoms may include:  ? Numbness or tingling. ? Weakness. ? Pain.     · You lose bladder or bowel control. Watch closely for changes in your health, and be sure to contact your doctor if:    · You do not get better as expected. Where can you learn more? Go to https://Shop Points.Smove. org and sign in to your blogTV account. Enter N118 in the [facility] box to learn more about \"Cervical Disc Disease: Care Instructions. \"     If you do not have an account, please click on the \"Sign Up Now\" link. Current as of: March 2, 2020               Content Version: 12.6  © 2006-2020 [employer]. Care instructions adapted under license by [facility] riskmethods [facility] ([facility]). If you have questions about a medical condition or this instruction, always ask your healthcare professional. [place] [address] any warranty or liability for your use of this information.          Patient Education        Well Visit, Women 48 to 72: Care Instructions  Your Care Instructions Physical exams can help you stay healthy. Your doctor has checked your overall health and may have suggested ways to take good care of yourself. He or she also may have recommended tests. At home, you can help prevent illness with healthy eating, regular exercise, and other steps. Follow-up care is a key part of your treatment and safety. Be sure to make and go to all appointments, and call your doctor if you are having problems. It's also a good idea to know your test results and keep a list of the medicines you take. How can you care for yourself at home? · Reach and stay at a healthy weight. This will lower your risk for many problems, such as obesity, diabetes, heart disease, and high blood pressure. · Get at least 30 minutes of exercise on most days of the week. Walking is a good choice. You also may want to do other activities, such as running, swimming, cycling, or playing tennis or team sports. · Do not smoke. Smoking can make health problems worse. If you need help quitting, talk to your doctor about stop-smoking programs and medicines. These can increase your chances of quitting for good. · Protect your skin from too much sun. When you're outdoors from 10 a.m. to 4 p.m., stay in the shade or cover up with clothing and a hat with a wide brim. Wear sunglasses that block UV rays. Even when it's cloudy, put broad-spectrum sunscreen (SPF 30 or higher) on any exposed skin. · See a dentist one or two times a year for checkups and to have your teeth cleaned. · Wear a seat belt in the car. Follow your doctor's advice about when to have certain tests. These tests can spot problems early. · Cholesterol. Your doctor will tell you how often to have this done based on your age, family history, or other things that can increase your risk for heart attack and stroke. · Blood pressure. Have your blood pressure checked during a routine doctor visit. Your doctor will tell you how often to check your blood pressure based on your age, your blood pressure results, and other factors. · Mammogram. Ask your doctor how often you should have a mammogram, which is an X-ray of your breasts. A mammogram can spot breast cancer before it can be felt and when it is easiest to treat. · Pap test and pelvic exam. Ask your doctor how often you should have a Pap test. You may not need to have a Pap test as often as you used to. · Vision. Have your eyes checked every year or two or as often as your doctor suggests. Some experts recommend that you have yearly exams for glaucoma and other age-related eye problems starting at age 48. · Hearing. Tell your doctor if you notice any change in your hearing. You can have tests to find out how well you hear. · Diabetes. Ask your doctor whether you should have tests for diabetes. · Colorectal cancer. Your risk for colorectal cancer gets higher as you get older. Some experts say that adults should start regular screening at age 48 and stop at age 76. Others say to start before age 48 or continue after age 76. Talk with your doctor about your risk and when to start and stop screening. · Thyroid disease. Talk to your doctor about whether to have your thyroid checked as part of a regular physical exam. Women have an increased chance of a thyroid problem. · Osteoporosis. You should begin tests for bone density at age 72. If you are younger than 72, ask your doctor whether you have factors that may increase your risk for this disease. You may want to have this test before age 72. · Heart attack and stroke risk. At least every 4 to 6 years, you should have your risk for heart attack and stroke assessed. Your doctor uses factors such as your age, blood pressure, cholesterol, and whether you smoke or have diabetes to show what your risk for a heart attack or stroke is over the next 10 years. When should you call for help? Watch closely for changes in your health, and be sure to contact your doctor if you have any problems or symptoms that concern you. Where can you learn more? Go to https://Yo-Fi WellnesskyaraItugo.[employer]. org and sign in to your CEDU account. Enter P181 in the [facility] box to learn more about \"Well Visit, Women 50 to 72: Care Instructions. \"     If you do not have an account, please click on the \"Sign Up Now\" link. Current as of: May 27, 2020               Content Version: 12.6  © 2006-2020 [employer]. Care instructions adapted under license by [facility] [place] ([facility]). If you have questions about a medical condition or this instruction, always ask your healthcare professional. [place] [address] any warranty or liability for your use of this information. Patient Education        Well Visit, Women 48 to 72: Care Instructions  Your Care Instructions     Physical exams can help you stay healthy. Your doctor has checked your overall health and may have suggested ways to take good care of yourself. He or she also may have recommended tests. At home, you can help prevent illness with healthy eating, regular exercise, and other steps. Follow-up care is a key part of your treatment and safety. Be sure to make and go to all appointments, and call your doctor if you are having problems. It's also a good idea to know your test results and keep a list of the medicines you take. How can you care for yourself at home? · Reach and stay at a healthy weight. This will lower your risk for many problems, such as obesity, diabetes, heart disease, and high blood pressure. · Get at least 30 minutes of exercise on most days of the week. Walking is a good choice. You also may want to do other activities, such as running, swimming, cycling, or playing tennis or team sports. · Do not smoke. Smoking can make health problems worse. If you need help quitting, talk to your doctor about stop-smoking programs and medicines. These can increase your chances of quitting for good. · Protect your skin from too much sun. When you're outdoors from 10 a.m. to 4 p.m., stay in the shade or cover up with clothing and a hat with a wide brim. Wear sunglasses that block UV rays. Even when it's cloudy, put broad-spectrum sunscreen (SPF 30 or higher) on any exposed skin. · See a dentist one or two times a year for checkups and to have your teeth cleaned. · Wear a seat belt in the car. Follow your doctor's advice about when to have certain tests. These tests can spot problems early. · Cholesterol. Your doctor will tell you how often to have this done based on your age, family history, or other things that can increase your risk for heart attack and stroke. · Blood pressure. Have your blood pressure checked during a routine doctor visit. Your doctor will tell you how often to check your blood pressure based on your age, your blood pressure results, and other factors. · Mammogram. Ask your doctor how often you should have a mammogram, which is an X-ray of your breasts. A mammogram can spot breast cancer before it can be felt and when it is easiest to treat. · Pap test and pelvic exam. Ask your doctor how often you should have a Pap test. You may not need to have a Pap test as often as you used to. Current as of: May 27, 2020               Content Version: 12.6  © 0425-8945 [email or web address]. Care instructions adapted under license by [facility] ([facility]). If you have questions about a medical condition or this instruction, always ask your healthcare professional. [address] any warranty or liability for your use of this information.

## 2021-02-16 NOTE — PROGRESS NOTES
SUBJECTIVE:  Shahriar Feldman is a 62 y.o. female being evaluated for:    Chief Complaint   Patient presents with    Annual Exam     annual physical, pap       HPI  Here for physical     Neck hurting her for a long time  Worse for the past the month. No trauma or unusual activity   Pain shooting down her left arm to her  Medial last 2 fingers  Headaches daily  Back of head  Worse since her neck was hurting  Some stress learning epic and on the computer all the time     Back  Is the same  No better or No worse  Had injections at [facility]  4 months ago or more  They did help and is do for more  Pain down her legs  Putting off surgery   No trouble urinating   Allergies   Allergen Reactions    Avelox [Moxifloxacin] Swelling and Rash    Sulfa Antibiotics Swelling and Rash    Chantix [Varenicline] Other (See Comments)     irritability and worsening depression     Codeine Nausea Only     Current Outpatient Medications   Medication Sig Dispense Refill    HYDROcodone-acetaminophen (NORCO) 7.5-325 MG per tablet Take 1 tablet by mouth every 8 hours as needed for Pain for up to 30 days. 90 tablet 0    lisinopril (PRINIVIL;ZESTRIL) 40 MG tablet TAKE 1 TABLET BY MOUTH EVERY DAY 90 tablet 1    omeprazole (PRILOSEC) 20 MG delayed release capsule Take 1 capsule by mouth daily (with breakfast) 90 capsule 5    tiZANidine (ZANAFLEX) 4 MG tablet 1/2 to 1 tablet tid prn 90 tablet 1    methylPREDNISolone (MEDROL DOSEPACK) 4 MG tablet Take by mouth as directed per package 1 kit 0    sertraline (ZOLOFT) 100 MG tablet TAKE 1 TABLET BY MOUTH DAILY 90 tablet 1    fluticasone-umeclidin-vilant (TRELEGY ELLIPTA) 100-62.5-25 MCG/INH AEPB Inhale 1 puff into the lungs daily 1 each 5    albuterol sulfate HFA (PROVENTIL HFA) 108 (90 Base) MCG/ACT inhaler Inhale 2 puffs into the lungs every 6 hours as needed for Wheezing 1 Inhaler 3     No current facility-administered medications for this visit.           Social History 02/2018    melanoma removed from right leg     Family History   Problem Relation Age of Onset    Heart Disease Mother     Stroke Mother     Emphysema Mother     COPD Mother     Diabetes Father     Atrial Fibrillation Father     Stroke Father     Cancer Sister         multiple myloma    No Known Problems Maternal Grandmother     No Known Problems Maternal Grandfather     No Known Problems Paternal Grandmother     No Known Problems Paternal Grandfather        Review of Systems   Constitutional: Positive for unexpected weight change. Negative for activity change and fatigue. HENT: Negative for congestion, nosebleeds, sinus pressure and trouble swallowing. Eyes: Negative for visual disturbance (glasses ). Respiratory: Negative for cough and shortness of breath. Cardiovascular: Negative for chest pain, palpitations and leg swelling. Gastrointestinal: Negative for abdominal pain, blood in stool, constipation, diarrhea, nausea and vomiting. Endocrine:        Self breast exams negative    Genitourinary: Negative for vaginal bleeding. Musculoskeletal: Positive for back pain and neck pain. Skin:        No skin lesions  See Dr Shaye Peterson in [facility]    Neurological: Positive for headaches. Negative for dizziness and light-headedness. Psychiatric/Behavioral: Negative for dysphoric mood, sleep disturbance and suicidal ideas. The patient is not nervous/anxious. OBJECTIVE:  /68 (Site: Right Upper Arm, Position: Sitting, Cuff Size: Medium Adult)   Pulse 93   Temp 97.7 °F (36.5 °C) (Temporal)   Ht 5' 1\" (1.549 m)   Wt 140 lb (63.5 kg)   LMP  (LMP Unknown)   SpO2 97%   Breastfeeding No   BMI 26.45 kg/m²      Body mass index is 26.45 kg/m². Physical Exam  Constitutional:       General: She is not in acute distress. Appearance: Normal appearance. She is well-developed. HENT:      Head: Normocephalic and atraumatic.       Right Ear: Tympanic membrane, ear canal and external ear normal.      Left Ear: Tympanic membrane, ear canal and external ear normal.      Nose: Nose normal.   Eyes:      Conjunctiva/sclera: Conjunctivae normal.   Neck:      Musculoskeletal: Normal range of motion and neck supple. Muscular tenderness present. Thyroid: No thyromegaly. Vascular: No carotid bruit or JVD. Cardiovascular:      Rate and Rhythm: Normal rate and regular rhythm. Heart sounds: Normal heart sounds. No murmur. No friction rub. No gallop. Pulmonary:      Effort: Pulmonary effort is normal.      Breath sounds: Normal breath sounds. Abdominal:      General: Bowel sounds are normal. There is no distension. Palpations: Abdomen is soft. Tenderness: There is no abdominal tenderness. Comments: No HSM   Genitourinary:     Vagina: Normal.      Comments: No ovarian fullness or mass,  Musculoskeletal: Normal range of motion. General: Tenderness (swelling and tenderness at C7 and tightness across upper back ) present. No swelling or deformity. Lymphadenopathy:      Cervical: No cervical adenopathy. Comments: No cervical, inguinal or axillary nodes    Skin:     General: Skin is warm and dry. Neurological:      General: No focal deficit present. Mental Status: She is alert and oriented to person, place, and time. Gait: Gait normal.   Psychiatric:         Behavior: Behavior normal.         Thought Content: Thought content normal.         ASSESSMENT/PLAN:    Donna Flores was seen today for annual exam.    Diagnoses and all orders for this visit:    Routine general medical examination at a health care facility    Cervical disc disease  -     tiZANidine (ZANAFLEX) 4 MG tablet; 1/2 to 1 tablet tid prn  -     XR CERVICAL SPINE (2-3 VIEWS); Future  -     methylPREDNISolone (MEDROL DOSEPACK) 4 MG tablet; Take by mouth as directed per package    Chronic pain syndrome oarrs was reviewed   -     HYDROcodone-acetaminophen (NORCO) 7.5-325 MG per tablet;  Take 1 tablet by mouth every 8 hours as needed for Pain for up to 30 days. DDD (degenerative disc disease), lumbar to follow up with ortho cincy considering another epidural series. Still trying to hold off on surgery  -     HYDROcodone-acetaminophen (NORCO) 7.5-325 MG per tablet; Take 1 tablet by mouth every 8 hours as needed for Pain for up to 30 days. Essential hypertension controlled  -     lisinopril (PRINIVIL;ZESTRIL) 40 MG tablet; TAKE 1 TABLET BY MOUTH EVERY DAY    Gastroesophageal reflux disease, unspecified whether esophagitis present increasing problems wants to renew her Prilosec  -     omeprazole (PRILOSEC) 20 MG delayed release capsule; Take 1 capsule by mouth daily (with breakfast)    Chronic obstructive pulmonary disease, unspecified COPD type ([address] able on inhalers and doing fine    Malignant melanoma of right lower extremity including hip (HCC) no sign of recurrence and follows up with Dr. Roselie Goodell for skin checks    Smoker only recommended cessation not motivated at this time    Cervical cancer screening  -     PAP SMEAR        Orders Placed This Encounter   Medications    HYDROcodone-acetaminophen (NORCO) 7.5-325 MG per tablet     Sig: Take 1 tablet by mouth every 8 hours as needed for Pain for up to 30 days.      Dispense:  90 tablet     Refill:  0     Reduce doses taken as pain becomes manageable    lisinopril (PRINIVIL;ZESTRIL) 40 MG tablet     Sig: TAKE 1 TABLET BY MOUTH EVERY DAY     Dispense:  90 tablet     Refill:  1    omeprazole (PRILOSEC) 20 MG delayed release capsule     Sig: Take 1 capsule by mouth daily (with breakfast)     Dispense:  90 capsule     Refill:  5    tiZANidine (ZANAFLEX) 4 MG tablet     Si/2 to 1 tablet tid prn     Dispense:  90 tablet     Refill:  1    methylPREDNISolone (MEDROL DOSEPACK) 4 MG tablet     Sig: Take by mouth as directed per package     Dispense:  1 kit     Refill:  0        Return in about 3 months (around 2021), or if symptoms worsen or fail to improve, for pain follow up . Patient Instructions     Please call your pharmacy if you need any refills of your medication(s). Please call our office at [phone] if you don't hear from us about your test results. Bring an accurate list of your medications with you at every appointment to ensure that we have the correct information. Our office hours are: Monday - Friday 8:30 am- 5 pm    Phone lines turn on at 8:30 am      Patient Education        Cervical Disc Disease: Care Instructions  Your Care Instructions     Cervical disc disease results from damage, disease, or wear and tear to the discs between the bones (vertebra) in your neck. The discs act as shock absorbers for the spine and keep the spine flexible. When a disc is damaged, it can bulge out and press against the nerve roots or spinal cord. This is sometimes called a herniated or \"slipped disc. \" This pressure can cause pain and numbness or tingling in your arms and hands. It can also cause weakness in your legs. An accident can damage a disc and cause it to break open (rupture). Aging and hard physical work can also cause damage to cervical discs. The first treatments for cervical disc disease include physical therapy, special neck exercises, heat, and pain medicine. If these fail, your doctor may inject steroids and pain medicine into your neck. Surgery is usually done only if other treatments have not worked. Follow-up care is a key part of your treatment and safety. Be sure to make and go to all appointments, and call your doctor if you are having problems. It's also a good idea to know your test results and keep a list of the medicines you take. How can you care for yourself at home? · Take pain medicines exactly as directed. ? If the doctor gave you a prescription medicine for pain, take it as prescribed.   ? If you are not taking a prescription pain medicine, ask your doctor if you can take an over-the-counter medicine. · Don't spend too long in one position. Take short breaks to move around and change positions. · Wear a seat belt and shoulder harness when you are in a car. · Sleep with a pillow under your head and neck that keeps your neck straight. · Follow your doctor's instructions for gentle neck-stretching exercises. · Do not smoke. Smoking can slow healing of your discs. If you need help quitting, talk to your doctor about stop-smoking programs and medicines. These can increase your chances of quitting for good. · Avoid strenuous work or exercise until your doctor says it is okay. When should you call for help? Call 911 anytime you think you may need emergency care. For example, call if:    · You are unable to move an arm or a leg at all. Call your doctor now or seek immediate medical care if:    · You have new or worse symptoms in your arms, legs, belly, or buttocks. Symptoms may include:  ? Numbness or tingling. ? Weakness. ? Pain.     · You lose bladder or bowel control. Watch closely for changes in your health, and be sure to contact your doctor if:    · You do not get better as expected. Where can you learn more? Go to https://Engine Yard.Lolly Wolly Doodle. org and sign in to your PolyRemedy account. Enter N118 in the LIVELENZ box to learn more about \"Cervical Disc Disease: Care Instructions. \"     If you do not have an account, please click on the \"Sign Up Now\" link. Current as of: March 2, 2020               Content Version: 12.6  © 2006-2020 [employer]. Care instructions adapted under license by [facility] ([facility]). If you have questions about a medical condition or this instruction, always ask your healthcare professional. [place] [address] any warranty or liability for your use of this information.          Patient Education        Well Visit, Women 48 to 72: Care Instructions  Your Care Instructions Physical exams can help you stay healthy. Your doctor has checked your overall health and may have suggested ways to take good care of yourself. He or she also may have recommended tests. At home, you can help prevent illness with healthy eating, regular exercise, and other steps. Follow-up care is a key part of your treatment and safety. Be sure to make and go to all appointments, and call your doctor if you are having problems. It's also a good idea to know your test results and keep a list of the medicines you take. How can you care for yourself at home? · Reach and stay at a healthy weight. This will lower your risk for many problems, such as obesity, diabetes, heart disease, and high blood pressure. · Get at least 30 minutes of exercise on most days of the week. Walking is a good choice. You also may want to do other activities, such as running, swimming, cycling, or playing tennis or team sports. · Do not smoke. Smoking can make health problems worse. If you need help quitting, talk to your doctor about stop-smoking programs and medicines. These can increase your chances of quitting for good. · Protect your skin from too much sun. When you're outdoors from 10 a.m. to 4 p.m., stay in the shade or cover up with clothing and a hat with a wide brim. Wear sunglasses that block UV rays. Even when it's cloudy, put broad-spectrum sunscreen (SPF 30 or higher) on any exposed skin. · See a dentist one or two times a year for checkups and to have your teeth cleaned. · Wear a seat belt in the car. Follow your doctor's advice about when to have certain tests. These tests can spot problems early. · Cholesterol. Your doctor will tell you how often to have this done based on your age, family history, or other things that can increase your risk for heart attack and stroke. · Blood pressure. Have your blood pressure checked during a routine doctor visit.  Your doctor will tell you how often to check your blood pressure based on your age, your blood pressure results, and other factors. · Mammogram. Ask your doctor how often you should have a mammogram, which is an X-ray of your breasts. A mammogram can spot breast cancer before it can be felt and when it is easiest to treat. · Pap test and pelvic exam. Ask your doctor how often you should have a Pap test. You may not need to have a Pap test as often as you used to. · Vision. Have your eyes checked every year or two or as often as your doctor suggests. Some experts recommend that you have yearly exams for glaucoma and other age-related eye problems starting at age 48. · Hearing. Tell your doctor if you notice any change in your hearing. You can have tests to find out how well you hear. · Diabetes. Ask your doctor whether you should have tests for diabetes. · Colorectal cancer. Your risk for colorectal cancer gets higher as you get older. Some experts say that adults should start regular screening at age 48 and stop at age 76. Others say to start before age 48 or continue after age 76. Talk with your doctor about your risk and when to start and stop screening. · Thyroid disease. Talk to your doctor about whether to have your thyroid checked as part of a regular physical exam. Women have an increased chance of a thyroid problem. · Osteoporosis. You should begin tests for bone density at age 72. If you are younger than 72, ask your doctor whether you have factors that may increase your risk for this disease. You may want to have this test before age 72. · Heart attack and stroke risk. At least every 4 to 6 years, you should have your risk for heart attack and stroke assessed. Your doctor uses factors such as your age, blood pressure, cholesterol, and whether you smoke or have diabetes to show what your risk for a heart attack or stroke is over the next 10 years. When should you call for help?   Watch closely for changes in your health, and be sure to contact your doctor if you have any problems or symptoms that concern you. Where can you learn more? Go to https://chpepiceweb.health-partners. org and sign in to your Properati account. Enter K684 in the [facility] box to learn more about \"Well Visit, Women 50 to 72: Care Instructions. \"     If you do not have an account, please click on the \"Sign Up Now\" link. Current as of: May 27, 2020               Content Version: 12.6  © 2006-2020 [employer]. Care instructions adapted under license by [facility] ([facility]). If you have questions about a medical condition or this instruction, always ask your healthcare professional. [place] [address] any warranty or liability for your use of this information. Patient Education        Well Visit, Women 48 to 72: Care Instructions  Your Care Instructions     Physical exams can help you stay healthy. Your doctor has checked your overall health and may have suggested ways to take good care of yourself. He or she also may have recommended tests. At home, you can help prevent illness with healthy eating, regular exercise, and other steps. Follow-up care is a key part of your treatment and safety. Be sure to make and go to all appointments, and call your doctor if you are having problems. It's also a good idea to know your test results and keep a list of the medicines you take. How can you care for yourself at home? · Reach and stay at a healthy weight. This will lower your risk for many problems, such as obesity, diabetes, heart disease, and high blood pressure. · Get at least 30 minutes of exercise on most days of the week. Walking is a good choice. You also may want to do other activities, such as running, swimming, cycling, or playing tennis or team sports. · Do not smoke. Smoking can make health problems worse. If you need help quitting, talk to your doctor about stop-smoking programs and medicines.  These can increase your chances of quitting for good. · Protect your skin from too much sun. When you're outdoors from 10 a.m. to 4 p.m., stay in the shade or cover up with clothing and a hat with a wide brim. Wear sunglasses that block UV rays. Even when it's cloudy, put broad-spectrum sunscreen (SPF 30 or higher) on any exposed skin. · See a dentist one or two times a year for checkups and to have your teeth cleaned. · Wear a seat belt in the car. Follow your doctor's advice about when to have certain tests. These tests can spot problems early. · Cholesterol. Your doctor will tell you how often to have this done based on your age, family history, or other things that can increase your risk for heart attack and stroke. · Blood pressure. Have your blood pressure checked during a routine doctor visit. Your doctor will tell you how often to check your blood pressure based on your age, your blood pressure results, and other factors. · Mammogram. Ask your doctor how often you should have a mammogram, which is an X-ray of your breasts. A mammogram can spot breast cancer before it can be felt and when it is easiest to treat. · Pap test and pelvic exam. Ask your doctor how often you should have a Pap test. You may not need to have a Pap test as often as you used to. · Vision. Have your eyes checked every year or two or as often as your doctor suggests. Some experts recommend that you have yearly exams for glaucoma and other age-related eye problems starting at age 48. · Hearing. Tell your doctor if you notice any change in your hearing. You can have tests to find out how well you hear. · Diabetes. Ask your doctor whether you should have tests for diabetes. · Colorectal cancer. Your risk for colorectal cancer gets higher as you get older. Some experts say that adults should start regular screening at age 48 and stop at age 76. Others say to start before age 48 or continue after age 76.  Talk with your doctor about your risk and when to start and stop screening. · Thyroid disease. Talk to your doctor about whether to have your thyroid checked as part of a regular physical exam. Women have an increased chance of a thyroid problem. · Osteoporosis. You should begin tests for bone density at age 72. If you are younger than 72, ask your doctor whether you have factors that may increase your risk for this disease. You may want to have this test before age 72. · Heart attack and stroke risk. At least every 4 to 6 years, you should have your risk for heart attack and stroke assessed. Your doctor uses factors such as your age, blood pressure, cholesterol, and whether you smoke or have diabetes to show what your risk for a heart attack or stroke is over the next 10 years. When should you call for help? Watch closely for changes in your health, and be sure to contact your doctor if you have any problems or symptoms that concern you. Where can you learn more? Go to https://BMP Sunstone Corporationpepicewkeyshawn.LoanHero. org and sign in to your "[employer] account. Enter A373 in the DailyObjects.com box to learn more about \"Well Visit, Women 50 to 72: Care Instructions. \"     If you do not have an account, please click on the \"Sign Up Now\" link. Current as of: May 27, 2020               Content Version: 12.6  © 2006-2020 [employer], Incorporated. Care instructions adapted under license by [facility] ([facility]). If you have questions about a medical condition or this instruction, always ask your healthcare professional. [place] [address] any warranty or liability for your use of this information.

## 2021-03-04 DIAGNOSIS — I10 ESSENTIAL HYPERTENSION: ICD-10-CM

## 2021-03-04 RX ORDER — LISINOPRIL 40 MG/1
TABLET ORAL
Qty: 90 TABLET | Refills: 1 | Status: SHIPPED | OUTPATIENT
Start: 2021-03-04 | End: 2021-04-16 | Stop reason: SDUPTHER

## 2021-03-16 DIAGNOSIS — M51.36 DDD (DEGENERATIVE DISC DISEASE), LUMBAR: ICD-10-CM

## 2021-03-16 DIAGNOSIS — G89.4 CHRONIC PAIN SYNDROME: ICD-10-CM

## 2021-03-16 RX ORDER — HYDROCODONE BITARTRATE AND ACETAMINOPHEN 7.5; 325 MG/1; MG/1
1 TABLET ORAL EVERY 8 HOURS PRN
Qty: 90 TABLET | Refills: 0 | Status: SHIPPED | OUTPATIENT
Start: 2021-03-16 | End: 2021-04-16 | Stop reason: SDUPTHER

## 2021-03-16 NOTE — TELEPHONE ENCOUNTER
Pt is needing a refill on   HYDROcodone-acetaminophen (NORCO) 7.5-325 MG per tablet     JOHANNY MOSLEY [address] [place] [address] [phone]

## 2021-04-07 NOTE — TELEPHONE ENCOUNTER
HR=79 bpm, FHCB=515/93 mmhg, SpO2=96.0 %, Resp=20 B/min, EtCO2=36 mmHg, Apnea=2 Seconds, Pain=0, Cardona=2, Comment=SR LAST OV(vv) 5/20/20

## 2021-04-16 ENCOUNTER — TELEPHONE (OUTPATIENT)
Dept: FAMILY MEDICINE CLINIC | Age: 61
End: 2021-04-16

## 2021-04-16 DIAGNOSIS — M51.36 DDD (DEGENERATIVE DISC DISEASE), LUMBAR: ICD-10-CM

## 2021-04-16 DIAGNOSIS — I10 ESSENTIAL HYPERTENSION: ICD-10-CM

## 2021-04-16 DIAGNOSIS — G89.4 CHRONIC PAIN SYNDROME: ICD-10-CM

## 2021-04-16 RX ORDER — LISINOPRIL 40 MG/1
40 TABLET ORAL DAILY
Qty: 90 TABLET | Refills: 1 | Status: SHIPPED | OUTPATIENT
Start: 2021-04-16 | End: 2022-03-15 | Stop reason: SDUPTHER

## 2021-04-16 RX ORDER — HYDROCODONE BITARTRATE AND ACETAMINOPHEN 7.5; 325 MG/1; MG/1
1 TABLET ORAL EVERY 8 HOURS PRN
Qty: 90 TABLET | Refills: 0 | Status: SHIPPED | OUTPATIENT
Start: 2021-04-16 | End: 2021-05-17 | Stop reason: SDUPTHER

## 2021-04-16 NOTE — TELEPHONE ENCOUNTER
Patient calling for refill on HYDROcodone-acetaminophen & Lisinopril 40 mg to [facility].           LOV  2/16/21

## 2021-05-17 ENCOUNTER — TELEPHONE (OUTPATIENT)
Dept: FAMILY MEDICINE CLINIC | Age: 61
End: 2021-05-17

## 2021-05-17 DIAGNOSIS — G89.4 CHRONIC PAIN SYNDROME: ICD-10-CM

## 2021-05-17 DIAGNOSIS — M51.36 DDD (DEGENERATIVE DISC DISEASE), LUMBAR: ICD-10-CM

## 2021-05-17 RX ORDER — HYDROCODONE BITARTRATE AND ACETAMINOPHEN 7.5; 325 MG/1; MG/1
1 TABLET ORAL EVERY 8 HOURS PRN
Qty: 90 TABLET | Refills: 0 | Status: SHIPPED | OUTPATIENT
Start: 2021-05-17 | End: 2021-06-16 | Stop reason: SDUPTHER

## 2021-05-17 NOTE — TELEPHONE ENCOUNTER
HYDROcodone-acetaminophen (NORCO) 7.5-325 MG     Patient is requesting a refill on         [facility] [phone]    [phone]      L.O.V.   11.16.20      N.O.V.  6/1/21

## 2021-05-26 DIAGNOSIS — F41.9 ANXIETY: ICD-10-CM

## 2021-05-27 RX ORDER — SERTRALINE HYDROCHLORIDE 100 MG/1
TABLET, FILM COATED ORAL
Qty: 90 TABLET | Refills: 1 | Status: SHIPPED
Start: 2021-05-27 | End: 2021-09-13 | Stop reason: ALTCHOICE

## 2021-06-01 ENCOUNTER — OFFICE VISIT (OUTPATIENT)
Dept: FAMILY MEDICINE CLINIC | Age: 61
End: 2021-06-01
Payer: COMMERCIAL

## 2021-06-01 VITALS
DIASTOLIC BLOOD PRESSURE: 78 MMHG | BODY MASS INDEX: 26.01 KG/M2 | WEIGHT: 137.8 LBS | OXYGEN SATURATION: 97 % | HEART RATE: 93 BPM | HEIGHT: 61 IN | TEMPERATURE: 98.4 F | SYSTOLIC BLOOD PRESSURE: 118 MMHG

## 2021-06-01 DIAGNOSIS — M54.40 CHRONIC LOW BACK PAIN WITH SCIATICA, SCIATICA LATERALITY UNSPECIFIED, UNSPECIFIED BACK PAIN LATERALITY: ICD-10-CM

## 2021-06-01 DIAGNOSIS — J44.9 CHRONIC OBSTRUCTIVE PULMONARY DISEASE, UNSPECIFIED COPD TYPE (HCC): ICD-10-CM

## 2021-06-01 DIAGNOSIS — I10 ESSENTIAL HYPERTENSION: ICD-10-CM

## 2021-06-01 DIAGNOSIS — K21.9 GASTROESOPHAGEAL REFLUX DISEASE, UNSPECIFIED WHETHER ESOPHAGITIS PRESENT: Primary | ICD-10-CM

## 2021-06-01 DIAGNOSIS — Z85.820 HISTORY OF MALIGNANT MELANOMA: ICD-10-CM

## 2021-06-01 DIAGNOSIS — Z13.220 SCREENING CHOLESTEROL LEVEL: ICD-10-CM

## 2021-06-01 DIAGNOSIS — R91.1 PULMONARY NODULE: ICD-10-CM

## 2021-06-01 DIAGNOSIS — G89.29 CHRONIC LOW BACK PAIN WITH SCIATICA, SCIATICA LATERALITY UNSPECIFIED, UNSPECIFIED BACK PAIN LATERALITY: ICD-10-CM

## 2021-06-01 DIAGNOSIS — R73.9 HYPERGLYCEMIA: ICD-10-CM

## 2021-06-01 PROCEDURE — 99214 OFFICE O/P EST MOD 30 MIN: CPT | Performed by: INTERNAL MEDICINE

## 2021-06-01 PROCEDURE — G8926 SPIRO NO PERF OR DOC: HCPCS | Performed by: INTERNAL MEDICINE

## 2021-06-01 PROCEDURE — G8419 CALC BMI OUT NRM PARAM NOF/U: HCPCS | Performed by: INTERNAL MEDICINE

## 2021-06-01 PROCEDURE — 4004F PT TOBACCO SCREEN RCVD TLK: CPT | Performed by: INTERNAL MEDICINE

## 2021-06-01 PROCEDURE — 3017F COLORECTAL CA SCREEN DOC REV: CPT | Performed by: INTERNAL MEDICINE

## 2021-06-01 PROCEDURE — G8427 DOCREV CUR MEDS BY ELIG CLIN: HCPCS | Performed by: INTERNAL MEDICINE

## 2021-06-01 PROCEDURE — 3023F SPIROM DOC REV: CPT | Performed by: INTERNAL MEDICINE

## 2021-06-01 RX ORDER — PANTOPRAZOLE SODIUM 40 MG/1
40 TABLET, DELAYED RELEASE ORAL
Qty: 30 TABLET | Refills: 5 | Status: SHIPPED | OUTPATIENT
Start: 2021-06-01 | End: 2022-09-13

## 2021-06-01 SDOH — ECONOMIC STABILITY: FOOD INSECURITY: WITHIN THE PAST 12 MONTHS, YOU WORRIED THAT YOUR FOOD WOULD RUN OUT BEFORE YOU GOT MONEY TO BUY MORE.: NEVER TRUE

## 2021-06-01 SDOH — ECONOMIC STABILITY: FOOD INSECURITY: WITHIN THE PAST 12 MONTHS, THE FOOD YOU BOUGHT JUST DIDN'T LAST AND YOU DIDN'T HAVE MONEY TO GET MORE.: NEVER TRUE

## 2021-06-01 SDOH — ECONOMIC STABILITY: TRANSPORTATION INSECURITY
IN THE PAST 12 MONTHS, HAS LACK OF TRANSPORTATION KEPT YOU FROM MEETINGS, WORK, OR FROM GETTING THINGS NEEDED FOR DAILY LIVING?: NO

## 2021-06-01 SDOH — ECONOMIC STABILITY: TRANSPORTATION INSECURITY
IN THE PAST 12 MONTHS, HAS THE LACK OF TRANSPORTATION KEPT YOU FROM MEDICAL APPOINTMENTS OR FROM GETTING MEDICATIONS?: NO

## 2021-06-01 ASSESSMENT — SOCIAL DETERMINANTS OF HEALTH (SDOH)
HOW HARD IS IT FOR YOU TO PAY FOR THE VERY BASICS LIKE FOOD, HOUSING, MEDICAL CARE, AND HEATING?: NOT HARD AT ALL
HOW HARD IS IT FOR YOU TO PAY FOR THE VERY BASICS LIKE FOOD, HOUSING, MEDICAL CARE, AND HEATING?: NOT HARD AT ALL

## 2021-06-01 NOTE — PROGRESS NOTES
SUBJECTIVE:  Magdi Vicente is a 62 y.o. female being evaluated for:    Chief Complaint   Patient presents with    3 Month Follow-Up      3 months  controlled medication followup . HPI   Issue with her stomach  Burns with the zoloft or tylenol  No diarrhea or bloody   Quit the omeprazole as stomach started with problems     depression is doing okay off the zoloft     Allergies   Allergen Reactions    Avelox [Moxifloxacin] Swelling and Rash    Sulfa Antibiotics Swelling and Rash    Chantix [Varenicline] Other (See Comments)     irritability and worsening depression     Codeine Nausea Only     Current Outpatient Medications   Medication Sig Dispense Refill    pantoprazole (PROTONIX) 40 MG tablet Take 1 tablet by mouth every morning (before breakfast) 30 tablet 5    fluticasone-umeclidin-vilant (TRELEGY ELLIPTA) 100-62.5-25 MCG/INH AEPB Inhale 1 puff into the lungs daily 1 each 5    sertraline (ZOLOFT) 100 MG tablet TAKE ONE TABLET BY MOUTH DAILY 90 tablet 1    HYDROcodone-acetaminophen (NORCO) 7.5-325 MG per tablet Take 1 tablet by mouth every 8 hours as needed for Pain for up to 30 days. 90 tablet 0    lisinopril (PRINIVIL;ZESTRIL) 40 MG tablet Take 1 tablet by mouth daily 90 tablet 1    tiZANidine (ZANAFLEX) 4 MG tablet 1/2 to 1 tablet tid prn 90 tablet 1    albuterol sulfate HFA (PROVENTIL HFA) 108 (90 Base) MCG/ACT inhaler Inhale 2 puffs into the lungs every 6 hours as needed for Wheezing 1 Inhaler 3    escitalopram (LEXAPRO) 20 MG tablet Take 1 tablet by mouth daily 90 tablet 1     No current facility-administered medications for this visit.          Social History     Socioeconomic History    Marital status:      Spouse name: Not on file    Number of children: Not on file    Years of education: Not on file    Highest education level: Not on file   Occupational History    Not on file   Tobacco Use    Smoking status: Current Every Day Smoker     Packs/day: 0.75     Years: 34.00 Pack years: 25.50     Types: Cigarettes     Start date: 1/1/1985    Smokeless tobacco: Never Used   Vaping Use    Vaping Use: Never used   Substance and Sexual Activity    Alcohol use: No    Drug use: No    Sexual activity: Yes     Partners: Male   Other Topics Concern    Not on file   Social History Narrative    Not on file     Social Determinants of Health     Financial Resource Strain: Low Risk     Difficulty of Paying Living Expenses: Not hard at all   Food Insecurity: No Food Insecurity    Worried About Running Out of Food in the Last Year: Never true    [address] in the Last Year: Never true   Transportation Needs: No Transportation Needs    Lack of Transportation (Medical): No    Lack of Transportation (Non-Medical):  No   Physical Activity:     Days of Exercise per Week:     Minutes of Exercise per Session:    Stress:     Feeling of Stress :    Social Connections:     Frequency of Communication with Friends and Family:     Frequency of Social Gatherings with Friends and Family:     Attends Restorationist Services:     Active Member of Clubs or Organizations:     Attends Club or Organization Meetings:     Marital Status:    Intimate Partner Violence:     Fear of Current or Ex-Partner:     Emotionally Abused:     Physically Abused:     Sexually Abused:       Past Medical History:   Diagnosis Date    Anxiety     Chronic back pain     Chronic pain     Depression     GERD (gastroesophageal reflux disease)     Hyperlipidemia     Lumbar disc disease     Melanoma ([address]     Migraine     Osteoarthritis     Sciatica of right side     Tobacco abuse     Unspecified essential hypertension       Past Surgical History:   Procedure Laterality Date    APPENDECTOMY      KNEE SURGERY Left     SHOULDER SURGERY Left     SKIN CANCER DESTRUCTION  02/2018    melanoma removed from right leg       Review of Systems   Constitutional: Negative for activity change, fatigue and unexpected weight change. HENT: Negative for nosebleeds and trouble swallowing. Respiratory: Negative for cough and shortness of breath. Cardiovascular: Negative for chest pain, palpitations and leg swelling. Gastrointestinal: Positive for abdominal pain and constipation (Chronic). Negative for blood in stool, diarrhea, nausea and vomiting. Increasing heartburn and reflux   Genitourinary: Negative for dysuria. Musculoskeletal: Positive for back pain. Neurological: Positive for headaches. Negative for dizziness and light-headedness. Psychiatric/Behavioral: Negative for dysphoric mood, sleep disturbance and suicidal ideas. The patient is not nervous/anxious. OBJECTIVE:  /78 (Site: Right Upper Arm, Position: Sitting, Cuff Size: Medium Adult)   Pulse 93   Temp 98.4 °F (36.9 °C) (Temporal)   Ht 5' 1\" (1.549 m)   Wt 137 lb 12.8 oz (62.5 kg)   LMP  (LMP Unknown)   SpO2 97%   BMI 26.04 kg/m²      Body mass index is 26.04 kg/m². Physical Exam  Constitutional:       General: She is not in acute distress. Appearance: Normal appearance. She is well-developed. HENT:      Head: Normocephalic and atraumatic. Mouth/Throat:      Pharynx: Oropharynx is clear. Eyes:      Conjunctiva/sclera: Conjunctivae normal.   Neck:      Thyroid: No thyromegaly. Vascular: No carotid bruit. Cardiovascular:      Rate and Rhythm: Normal rate and regular rhythm. Heart sounds: Normal heart sounds. No murmur heard. No friction rub. No gallop. Pulmonary:      Effort: Pulmonary effort is normal.   Chest:      Chest wall: No tenderness. Abdominal:      General: Bowel sounds are normal. There is no distension. Palpations: Abdomen is soft. Tenderness: There is abdominal tenderness (epigastric). Comments: No hepatosplenomegaly   Musculoskeletal:         General: Tenderness (Right paraspinal) present. No swelling. Cervical back: Normal range of motion and neck supple. Lymphadenopathy:      Cervical: No cervical adenopathy. Skin:     General: Skin is warm and dry. Neurological:      General: No focal deficit present. Mental Status: She is alert. Gait: Gait normal.   Psychiatric:         Mood and Affect: Mood normal.         Behavior: Behavior normal.         Thought Content: Thought content normal.         ASSESSMENT/PLAN:    Frankie Gu was seen today for 3 month follow-up. Diagnoses and all orders for this visit:    Gastroesophageal reflux disease, unspecified whether esophagitis present try another proton pump inhibitor  -     CBC Auto Differential; Future  -     pantoprazole (PROTONIX) 40 MG tablet; Take 1 tablet by mouth every morning (before breakfast)    Essential hypertension controlled  -     Comprehensive Metabolic Panel; Future    Chronic obstructive pulmonary disease, unspecified COPD type ([address] increasing trouble try Trelegy. Strongly again suggested smoking cessation  -     fluticasone-umeclidin-vilant ([place]) 100-62.5-25 MCG/INH AEPB; Inhale 1 puff into the lungs daily    Pulmonary nodule  -     CT CHEST WO CONTRAST; Future    History of malignant melanoma  -     CT CHEST WO CONTRAST; Future    Hyperglycemia  -     Hemoglobin A1C; Future    Chronic low back pain with sciatica, sciatica laterality unspecified, unspecified back pain laterality  Comments:  On chronic Norco, OARRS    Screening cholesterol level  -     Lipid Panel; Future      Orders Placed This Encounter   Medications    pantoprazole (PROTONIX) 40 MG tablet     Sig: Take 1 tablet by mouth every morning (before breakfast)     Dispense:  30 tablet     Refill:  5    fluticasone-umeclidin-vilant (TRELEGY ELLIPTA) 100-62.5-25 MCG/INH AEPB     Sig: Inhale 1 puff into the lungs daily     Dispense:  1 each     Refill:  5        Return in about 3 months (around 9/1/2021), or if symptoms worsen or fail to improve. There are no Patient Instructions on file for this visit.

## 2021-06-03 ENCOUNTER — TELEPHONE (OUTPATIENT)
Dept: FAMILY MEDICINE CLINIC | Age: 61
End: 2021-06-03

## 2021-06-03 RX ORDER — ESCITALOPRAM OXALATE 20 MG/1
20 TABLET ORAL DAILY
Qty: 90 TABLET | Refills: 1 | Status: SHIPPED | OUTPATIENT
Start: 2021-06-03 | End: 2021-09-13 | Stop reason: SDUPTHER

## 2021-06-03 NOTE — TELEPHONE ENCOUNTER
Called in Lexapro 20 mg a day. Does take a while to build up in your system.   If worsening depression please call us immediately

## 2021-06-03 NOTE — TELEPHONE ENCOUNTER
Patient called pharmacy and they changed  on sertraline. She is asking if you can call in [employer] to [email or web address]. She has been on that before and really needs something.

## 2021-06-06 ASSESSMENT — ENCOUNTER SYMPTOMS
COUGH: 0
BACK PAIN: 1
CONSTIPATION: 1
VOMITING: 0
BLOOD IN STOOL: 0
TROUBLE SWALLOWING: 0
SHORTNESS OF BREATH: 0
DIARRHEA: 0
NAUSEA: 0
ABDOMINAL PAIN: 1

## 2021-06-16 DIAGNOSIS — G89.4 CHRONIC PAIN SYNDROME: ICD-10-CM

## 2021-06-16 DIAGNOSIS — M51.36 DDD (DEGENERATIVE DISC DISEASE), LUMBAR: ICD-10-CM

## 2021-06-16 RX ORDER — HYDROCODONE BITARTRATE AND ACETAMINOPHEN 7.5; 325 MG/1; MG/1
1 TABLET ORAL EVERY 8 HOURS PRN
Qty: 90 TABLET | Refills: 0 | Status: SHIPPED | OUTPATIENT
Start: 2021-06-16 | End: 2021-07-15 | Stop reason: SDUPTHER

## 2021-07-15 ENCOUNTER — TELEPHONE (OUTPATIENT)
Dept: FAMILY MEDICINE CLINIC | Age: 61
End: 2021-07-15

## 2021-07-15 DIAGNOSIS — G89.4 CHRONIC PAIN SYNDROME: ICD-10-CM

## 2021-07-15 DIAGNOSIS — M51.36 DDD (DEGENERATIVE DISC DISEASE), LUMBAR: ICD-10-CM

## 2021-07-15 RX ORDER — HYDROCODONE BITARTRATE AND ACETAMINOPHEN 7.5; 325 MG/1; MG/1
1 TABLET ORAL EVERY 8 HOURS PRN
Qty: 90 TABLET | Refills: 0 | Status: SHIPPED | OUTPATIENT
Start: 2021-07-15 | End: 2021-08-16 | Stop reason: SDUPTHER

## 2021-08-16 DIAGNOSIS — G89.4 CHRONIC PAIN SYNDROME: ICD-10-CM

## 2021-08-16 DIAGNOSIS — M51.36 DDD (DEGENERATIVE DISC DISEASE), LUMBAR: ICD-10-CM

## 2021-08-16 RX ORDER — HYDROCODONE BITARTRATE AND ACETAMINOPHEN 7.5; 325 MG/1; MG/1
1 TABLET ORAL EVERY 8 HOURS PRN
Qty: 90 TABLET | Refills: 0 | Status: SHIPPED | OUTPATIENT
Start: 2021-08-16 | End: 2021-09-13 | Stop reason: SDUPTHER

## 2021-08-16 NOTE — TELEPHONE ENCOUNTER
----- Message from Zaida Hui sent at 8/16/2021  9:23 AM EDT -----  Subject: Refill Request    QUESTIONS  Name of Medication? HYDROcodone-acetaminophen (NORCO) 7.5-325 MG per   tablet  Patient-reported dosage and instructions? na  How many days do you have left? 0  [id number]  Pharmacy phone number (if available)? [phone]  ---------------------------------------------------------------------------  --------------  Rosmery SINGER  What is the best way for the office to contact you? OK to leave message on   voicemail  Preferred Call Back Phone Number?  [phone]

## 2021-09-13 ENCOUNTER — OFFICE VISIT (OUTPATIENT)
Dept: FAMILY MEDICINE CLINIC | Age: 61
End: 2021-09-13

## 2021-09-13 VITALS
HEART RATE: 74 BPM | TEMPERATURE: 97.4 F | SYSTOLIC BLOOD PRESSURE: 118 MMHG | HEIGHT: 61 IN | OXYGEN SATURATION: 98 % | BODY MASS INDEX: 24.66 KG/M2 | WEIGHT: 130.6 LBS | DIASTOLIC BLOOD PRESSURE: 64 MMHG

## 2021-09-13 DIAGNOSIS — M51.36 DDD (DEGENERATIVE DISC DISEASE), LUMBAR: ICD-10-CM

## 2021-09-13 DIAGNOSIS — M50.90 CERVICAL DISC DISEASE: ICD-10-CM

## 2021-09-13 DIAGNOSIS — G89.4 CHRONIC PAIN SYNDROME: ICD-10-CM

## 2021-09-13 PROCEDURE — 99212 OFFICE O/P EST SF 10 MIN: CPT | Performed by: INTERNAL MEDICINE

## 2021-09-13 RX ORDER — ESCITALOPRAM OXALATE 20 MG/1
20 TABLET ORAL DAILY
Qty: 30 TABLET | Refills: 2 | Status: SHIPPED | OUTPATIENT
Start: 2021-09-13 | End: 2021-12-20 | Stop reason: SDUPTHER

## 2021-09-13 RX ORDER — HYDROCODONE BITARTRATE AND ACETAMINOPHEN 7.5; 325 MG/1; MG/1
1 TABLET ORAL EVERY 8 HOURS PRN
Qty: 90 TABLET | Refills: 0 | Status: SHIPPED | OUTPATIENT
Start: 2021-09-13 | End: 2021-10-12 | Stop reason: SDUPTHER

## 2021-09-13 RX ORDER — PREDNISONE 10 MG/1
10 TABLET ORAL DAILY
Qty: 10 TABLET | Refills: 1 | Status: SHIPPED | OUTPATIENT
Start: 2021-09-13 | End: 2021-10-03

## 2021-09-13 RX ORDER — TIZANIDINE 4 MG/1
TABLET ORAL
Qty: 90 TABLET | Refills: 1 | Status: SHIPPED | OUTPATIENT
Start: 2021-09-13 | End: 2022-03-21 | Stop reason: SDUPTHER

## 2021-09-13 NOTE — PROGRESS NOTES
SUBJECTIVE:  Vee Clifton is a 62 y.o. female being evaluated for:    Chief Complaint   Patient presents with    3 Month Follow-Up      Pt is here for 3 months followup on the medication. HPI   Has quit her job  Caring for her mother in law. Chronic back pain and is doing a lot of physical work and back is killing her  No pain down her leg  Status post injections and has been a while   Without health insurance currently self pay   Depression okay a little worse with all the above  Not suicidal   Breathing is terrible  Unable to get trilegy without insurance   Still smoking   Coughing up clear stuff  Not always able to get it up   Sob with activity  Wheezing  No fevers or chills      Allergies   Allergen Reactions    Avelox [Moxifloxacin] Swelling and Rash    Sulfa Antibiotics Swelling and Rash    Chantix [Varenicline] Other (See Comments)     irritability and worsening depression     Codeine Nausea Only     Current Outpatient Medications   Medication Sig Dispense Refill    escitalopram (LEXAPRO) 20 MG tablet Take 1 tablet by mouth daily 30 tablet 2    HYDROcodone-acetaminophen (NORCO) 7.5-325 MG per tablet Take 1 tablet by mouth every 8 hours as needed for Pain for up to 30 days. 90 tablet 0    tiZANidine (ZANAFLEX) 4 MG tablet 1/2 to 1 tablet tid prn 90 tablet 1    predniSONE (DELTASONE) 10 MG tablet Take 1 tablet by mouth daily for 20 days 10 tablet 1    pantoprazole (PROTONIX) 40 MG tablet Take 1 tablet by mouth every morning (before breakfast) 30 tablet 5    fluticasone-umeclidin-vilant (TRELEGY ELLIPTA) 100-62.5-25 MCG/INH AEPB Inhale 1 puff into the lungs daily 1 each 5    lisinopril (PRINIVIL;ZESTRIL) 40 MG tablet Take 1 tablet by mouth daily 90 tablet 1    albuterol sulfate HFA (PROVENTIL HFA) 108 (90 Base) MCG/ACT inhaler Inhale 2 puffs into the lungs every 6 hours as needed for Wheezing 1 Inhaler 3     No current facility-administered medications for this visit.          Social History     Socioeconomic History    Marital status:      Spouse name: Not on file    Number of children: Not on file    Years of education: Not on file    Highest education level: Not on file   Occupational History    Not on file   Tobacco Use    Smoking status: Current Every Day Smoker     Packs/day: 0.75     Years: 34.00     Pack years: 25.50     Types: Cigarettes     Start date: 1/1/1985    Smokeless tobacco: Never Used   Vaping Use    Vaping Use: Never used   Substance and Sexual Activity    Alcohol use: No    Drug use: No    Sexual activity: Yes     Partners: Male   Other Topics Concern    Not on file   Social History Narrative    Not on file     Social Determinants of Health     Financial Resource Strain: Low Risk     Difficulty of Paying Living Expenses: Not hard at all   Food Insecurity: No Food Insecurity    Worried About [address] in the Last Year: Never true    [address] in the Last Year: Never true   Transportation Needs: No Transportation Needs    Lack of Transportation (Medical): No    Lack of Transportation (Non-Medical):  No   Physical Activity:     Days of Exercise per Week:     Minutes of Exercise per Session:    Stress:     Feeling of Stress :    Social Connections:     Frequency of Communication with Friends and Family:     Frequency of Social Gatherings with Friends and Family:     Attends Druze Services:     Active Member of Clubs or Organizations:     Attends Club or Organization Meetings:     Marital Status:    Intimate Partner Violence:     Fear of Current or Ex-Partner:     Emotionally Abused:     Physically Abused:     Sexually Abused:       Past Medical History:   Diagnosis Date    Anxiety     Chronic back pain     Chronic pain     Depression     GERD (gastroesophageal reflux disease)     Hyperlipidemia     Lumbar disc disease     [facility]     Migraine     Osteoarthritis     Sciatica of right side     Tobacco abuse     Unspecified essential hypertension      Past Surgical History:   Procedure Laterality Date    APPENDECTOMY      KNEE SURGERY Left     SHOULDER SURGERY Left     SKIN CANCER DESTRUCTION  02/2018    melanoma removed from right leg       Review of Systems   Constitutional: Negative for chills and fever. HENT: Negative for congestion, nosebleeds and sinus pressure. Respiratory: Positive for cough, shortness of breath and wheezing. Cardiovascular: Negative for chest pain, palpitations and leg swelling. Gastrointestinal: Positive for constipation (chronic ). Negative for abdominal pain, nausea and vomiting. Genitourinary: Negative for dysuria. Musculoskeletal: Positive for back pain. Neurological: Negative for dizziness, light-headedness and headaches. Psychiatric/Behavioral: Positive for dysphoric mood. Negative for sleep disturbance. OBJECTIVE:  /64 (Site: Right Upper Arm, Position: Sitting, Cuff Size: Medium Adult)   Pulse 74   Temp 97.4 °F (36.3 °C) (Temporal)   Ht 5' 1\" (1.549 m)   Wt 130 lb 9.6 oz (59.2 kg)   LMP  (LMP Unknown)   SpO2 98%   BMI 24.68 kg/m²      Body mass index is 24.68 kg/m². Physical Exam  Constitutional:       General: She is not in acute distress. Appearance: Normal appearance. She is well-developed. HENT:      Head: Normocephalic and atraumatic. Right Ear: Tympanic membrane and ear canal normal.      Left Ear: Tympanic membrane and ear canal normal.      Nose: Nose normal.      Mouth/Throat:      Pharynx: Oropharynx is clear. Eyes:      Conjunctiva/sclera: Conjunctivae normal.   Neck:      Thyroid: No thyromegaly. Vascular: No JVD. Cardiovascular:      Rate and Rhythm: Normal rate and regular rhythm. Heart sounds: Normal heart sounds. No murmur heard. No friction rub. No gallop. Pulmonary:      Effort: Pulmonary effort is normal.      Breath sounds: Wheezing present.       Comments: Decreased BS   Abdominal: General: Bowel sounds are normal. There is no distension. Palpations: Abdomen is soft. Tenderness: There is no abdominal tenderness. Comments: No HSM   Genitourinary:     Vagina: Normal.      Comments: No ovarian fullness or mass,  Musculoskeletal:         General: Tenderness (low back ) present. No swelling. Cervical back: Neck supple. Muscular tenderness present. Lymphadenopathy:      Cervical: No cervical adenopathy. Skin:     General: Skin is warm and dry. Neurological:      General: No focal deficit present. Mental Status: She is alert. Gait: Gait normal.   Psychiatric:         Behavior: Behavior normal.         Thought Content: Thought content normal.         ASSESSMENT/PLAN:    Wesley muniz was seen today for 3 month follow-up. Diagnoses and all orders for this visit:    Chronic pain syndrome reviewed oarrs and filled out pain form   -     HYDROcodone-acetaminophen (NORCO) 7.5-325 MG per tablet; Take 1 tablet by mouth every 8 hours as needed for Pain for up to 30 days. DDD (degenerative disc disease), lumbar  -     HYDROcodone-acetaminophen (NORCO) 7.5-325 MG per tablet; Take 1 tablet by mouth every 8 hours as needed for Pain for up to 30 days. -     predniSONE (DELTASONE) 10 MG tablet; Take 1 tablet by mouth daily for 20 days      Cervical disc disease  -     tiZANidine (ZANAFLEX) 4 MG tablet; 1/2 to 1 tablet tid prn    Anxiety and depression  Increase dose   -     escitalopram (LEXAPRO) 20 MG tablet; Take 1 tablet by mouth daily      Orders Placed This Encounter   Medications    escitalopram (LEXAPRO) 20 MG tablet     Sig: Take 1 tablet by mouth daily     Dispense:  30 tablet     Refill:  2    HYDROcodone-acetaminophen (NORCO) 7.5-325 MG per tablet     Sig: Take 1 tablet by mouth every 8 hours as needed for Pain for up to 30 days.      Dispense:  90 tablet     Refill:  0     Reduce doses taken as pain becomes manageable    tiZANidine (ZANAFLEX) 4 MG tablet     Sig:

## 2021-09-18 ASSESSMENT — ENCOUNTER SYMPTOMS
ABDOMINAL PAIN: 0
SINUS PRESSURE: 0
WHEEZING: 1
NAUSEA: 0
SHORTNESS OF BREATH: 1
VOMITING: 0
CONSTIPATION: 1
BACK PAIN: 1
COUGH: 1

## 2021-10-12 DIAGNOSIS — M51.36 DDD (DEGENERATIVE DISC DISEASE), LUMBAR: ICD-10-CM

## 2021-10-12 DIAGNOSIS — G89.4 CHRONIC PAIN SYNDROME: ICD-10-CM

## 2021-10-12 RX ORDER — HYDROCODONE BITARTRATE AND ACETAMINOPHEN 7.5; 325 MG/1; MG/1
1 TABLET ORAL EVERY 8 HOURS PRN
Qty: 90 TABLET | Refills: 0 | Status: SHIPPED | OUTPATIENT
Start: 2021-10-12 | End: 2021-11-15 | Stop reason: SDUPTHER

## 2021-11-02 ENCOUNTER — TELEPHONE (OUTPATIENT)
Dept: FAMILY MEDICINE CLINIC | Age: 61
End: 2021-11-02

## 2021-11-02 DIAGNOSIS — J44.9 CHRONIC OBSTRUCTIVE PULMONARY DISEASE, UNSPECIFIED COPD TYPE (HCC): ICD-10-CM

## 2021-11-02 NOTE — TELEPHONE ENCOUNTER
----- Message from Angi Reynaga sent at 11/2/2021  5:24 PM EDT -----  Subject: Referral Request    QUESTIONS   Reason for referral request? Orders for CT chest w/o contrast order 6/1/21   needs to be re-faxed to [address] [address] fax at [phone]   Has the physician seen you for this condition before? No   Preferred Specialist (if applicable)? Do you already have an appointment scheduled? No  Additional Information for Provider?   ---------------------------------------------------------------------------  --------------  CALL BACK INFO  What is the best way for the office to contact you? OK to leave message on   voicemail  Preferred Call Back Phone Number?  [phone]

## 2021-11-04 ENCOUNTER — TELEPHONE (OUTPATIENT)
Dept: FAMILY MEDICINE CLINIC | Age: 61
End: 2021-11-04

## 2021-11-04 NOTE — TELEPHONE ENCOUNTER
Called pt's Ins, spoke with Chloe Rand. Unless the patient is being sedated no pre cert is needed for the Ct Chest wo contrast.    Pt informed.    KERMIT Marsh no pre cert required also faxed order to   [phone]

## 2021-11-04 NOTE — TELEPHONE ENCOUNTER
Salma with [facility] calling, patient called yesterday and scheduled CT chest for today at 4:30 and it requires a PA.       Please call, [phone]

## 2021-11-15 DIAGNOSIS — M51.36 DDD (DEGENERATIVE DISC DISEASE), LUMBAR: ICD-10-CM

## 2021-11-15 DIAGNOSIS — G89.4 CHRONIC PAIN SYNDROME: ICD-10-CM

## 2021-11-15 RX ORDER — HYDROCODONE BITARTRATE AND ACETAMINOPHEN 7.5; 325 MG/1; MG/1
1 TABLET ORAL EVERY 8 HOURS PRN
Qty: 90 TABLET | Refills: 0 | Status: SHIPPED | OUTPATIENT
Start: 2021-11-15 | End: 2021-12-14 | Stop reason: SDUPTHER

## 2021-11-15 NOTE — TELEPHONE ENCOUNTER
Labs ordered  Let's see how the bloodwork looks  If that is unremarkable I want her to see a gastroenterologist Left detailed message on vm

## 2021-11-15 NOTE — TELEPHONE ENCOUNTER
----- Message from Rosario Rust sent at 11/15/2021  1:03 PM EST -----  Subject: Refill Request    QUESTIONS  Name of Medication? HYDROcodone-acetaminophen (NORCO) 7.5-325 MG per   tablet  Patient-reported dosage and instructions? Take 1 tablet by mouth every 8   hours as needed for Pain for up to 30 days. How many days do you have left? 1  Preferred Pharmacy? [id number]  Pharmacy phone number (if available)? [phone]  ---------------------------------------------------------------------------  --------------  Erin SINGER  What is the best way for the office to contact you? OK to leave message on   voicemail  Preferred Call Back Phone Number?  [phone]

## 2021-12-14 ENCOUNTER — OFFICE VISIT (OUTPATIENT)
Dept: FAMILY MEDICINE CLINIC | Age: 61
End: 2021-12-14
Payer: COMMERCIAL

## 2021-12-14 VITALS
DIASTOLIC BLOOD PRESSURE: 70 MMHG | WEIGHT: 128.8 LBS | HEIGHT: 61 IN | BODY MASS INDEX: 24.32 KG/M2 | TEMPERATURE: 98.3 F | HEART RATE: 70 BPM | SYSTOLIC BLOOD PRESSURE: 110 MMHG | OXYGEN SATURATION: 98 %

## 2021-12-14 DIAGNOSIS — Z23 NEEDS FLU SHOT: ICD-10-CM

## 2021-12-14 DIAGNOSIS — G89.4 CHRONIC PAIN SYNDROME: ICD-10-CM

## 2021-12-14 DIAGNOSIS — Z23 NEED FOR IMMUNIZATION AGAINST INFLUENZA: ICD-10-CM

## 2021-12-14 DIAGNOSIS — J44.9 CHRONIC OBSTRUCTIVE PULMONARY DISEASE, UNSPECIFIED COPD TYPE (HCC): Primary | ICD-10-CM

## 2021-12-14 DIAGNOSIS — F17.200 SMOKER: ICD-10-CM

## 2021-12-14 DIAGNOSIS — M51.36 DDD (DEGENERATIVE DISC DISEASE), LUMBAR: ICD-10-CM

## 2021-12-14 PROBLEM — I77.1 SUBCLAVIAN ARTERY STENOSIS (HCC): Status: ACTIVE | Noted: 2021-12-14

## 2021-12-14 PROBLEM — R41.3 MEMORY LOSS OF UNKNOWN CAUSE: Status: ACTIVE | Noted: 2021-12-14

## 2021-12-14 PROBLEM — I65.23 CAROTID STENOSIS, BILATERAL: Status: ACTIVE | Noted: 2021-12-14

## 2021-12-14 PROBLEM — I65.09 VERTEBRAL ARTERY STENOSIS: Status: ACTIVE | Noted: 2021-12-14

## 2021-12-14 PROBLEM — H53.9 VISUAL DISTURBANCE: Status: ACTIVE | Noted: 2021-12-14

## 2021-12-14 PROCEDURE — 90674 CCIIV4 VAC NO PRSV 0.5 ML IM: CPT | Performed by: NURSE PRACTITIONER

## 2021-12-14 PROCEDURE — 90471 IMMUNIZATION ADMIN: CPT | Performed by: NURSE PRACTITIONER

## 2021-12-14 PROCEDURE — 99214 OFFICE O/P EST MOD 30 MIN: CPT | Performed by: NURSE PRACTITIONER

## 2021-12-14 RX ORDER — HYDROCODONE BITARTRATE AND ACETAMINOPHEN 7.5; 325 MG/1; MG/1
1 TABLET ORAL EVERY 8 HOURS PRN
Qty: 90 TABLET | Refills: 0 | Status: SHIPPED | OUTPATIENT
Start: 2021-12-14 | End: 2022-01-13 | Stop reason: SDUPTHER

## 2021-12-14 ASSESSMENT — ENCOUNTER SYMPTOMS
ABDOMINAL PAIN: 0
APNEA: 0
CONSTIPATION: 1
COUGH: 0
NAUSEA: 0
SHORTNESS OF BREATH: 0
BACK PAIN: 1
VOMITING: 0
WHEEZING: 0

## 2021-12-14 NOTE — PROGRESS NOTES
Vaccine Information Sheet, \"Influenza - Inactivated\"  given to Tatiana Donovan, or parent/legal guardian of  Tatiana Donovan and verbalized understanding. Patient responses:    Have you ever had a reaction to a flu vaccine? No  Do you have any current illness? No  Have you ever had [place] Syndrome? No  Do you have a serious allergy to any of the follow: Neomycin, Polymyxin, Thimerosal, eggs or egg products? No    Flu vaccine given per order. Please see immunization tab. Risks and benefits explained. Current VIS given.

## 2021-12-14 NOTE — PROGRESS NOTES
Dre Matson (:  1960) is a 62 y.o. female,Established patient, here for evaluation of the following chief complaint(s):  Check-Up (3 month check up hydrocodone refill) and Flu Vaccine         ASSESSMENT/PLAN:  1. Chronic pain syndrome  PDMP reviewed. Pt instructed she has to see Nicole Edge for future refills.  - HYDROcodone-acetaminophen (NORCO) 7.5-325 MG per tablet; Take 1 tablet by mouth every 8 hours as needed for Pain for up to 30 days. Dispense: 90 tablet; Refill: 0    2. DDD (degenerative disc disease), lumbar  Ongoing, PDMP reviewed. Pt instructed she has to see Nicole Edge for future refills.  - HYDROcodone-acetaminophen (NORCO) 7.5-325 MG per tablet; Take 1 tablet by mouth every 8 hours as needed for Pain for up to 30 days. Dispense: 90 tablet; Refill: 0    3. Chronic obstructive pulmonary disease, unspecified COPD type ([address]  Well controlled on trelegy and PRN albuterol. 4. Smoker  Discussed smoking cessation health benefits. 5. Need for immunization  Up dated influenza vacc today in office. Return in about 3 months (around 3/14/2022). Subjective   SUBJECTIVE/OBJECTIVE:  HPI   Presents today for narcotic refill. 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 States she lifts her momnlaw and has chronic lower back pain, now she is having upper back pain. States her mnlaw puts her arms around pt neck and that's how she lifts and pivots her mnlaw. Now she has a hospice aid that comes three times a week. [facility] nurse 2 times a week. States trelegy is working well. States she was off the trelegy for 3 months due to loss of insurance. States she now has insurance and she has been back on trelegy fro the last month and she feels much better. Denies inceased sob or sputum production. States she still smokes but is cutting back, a few cigarettes a day. Requsts flu shot today.      Current Outpatient Medications   Medication Sig Dispense Refill    HYDROcodone-acetaminophen (NORCO) 7.5-325 MG per tablet Take 1 tablet by mouth every 8 hours as needed for Pain for up to 30 days. 90 tablet 0    fluticasone-umeclidin-vilant (TRELEGY ELLIPTA) 100-62.5-25 MCG/INH AEPB Inhale 1 puff into the lungs daily 1 each 3    escitalopram (LEXAPRO) 20 MG tablet Take 1 tablet by mouth daily 30 tablet 2    tiZANidine (ZANAFLEX) 4 MG tablet 1/2 to 1 tablet tid prn 90 tablet 1    pantoprazole (PROTONIX) 40 MG tablet Take 1 tablet by mouth every morning (before breakfast) 30 tablet 5    lisinopril (PRINIVIL;ZESTRIL) 40 MG tablet Take 1 tablet by mouth daily 90 tablet 1    albuterol sulfate HFA (PROVENTIL HFA) 108 (90 Base) MCG/ACT inhaler Inhale 2 puffs into the lungs every 6 hours as needed for Wheezing 1 Inhaler 3     No current facility-administered medications for this visit. Past Medical History:   Diagnosis Date    Anxiety     Chronic back pain     Chronic pain     Depression     GERD (gastroesophageal reflux disease)     Hyperlipidemia     Lumbar disc disease     [address]an[address] [address]     Migraine     Osteoarthritis     Sciatica of right side     Tobacco abuse     Unspecified essential hypertension         Review of Systems   Constitutional: Negative for activity change, fatigue, fever and unexpected weight change. Respiratory: Negative for apnea, cough, shortness of breath and wheezing. Cardiovascular: Negative for chest pain, palpitations and leg swelling. Gastrointestinal: Positive for constipation (states it is chronic, history of IBS and chronic opiod use). Negative for abdominal pain, nausea and vomiting. Genitourinary: Negative for difficulty urinating. Musculoskeletal: Positive for arthralgias (fingers) and back pain (upper and lower back pain). Skin: Negative. Neurological: Negative for dizziness, weakness, light-headedness and headaches. Psychiatric/Behavioral: Negative for sleep disturbance.  The patient is nervous/anxious. Objective   Physical Exam  Constitutional:       General: She is not in acute distress. Appearance: She is normal weight. She is not ill-appearing. HENT:      Head: Normocephalic and atraumatic. Mouth/Throat:      Mouth: Mucous membranes are moist.      Pharynx: Oropharynx is clear. Eyes:      Extraocular Movements: Extraocular movements intact. Conjunctiva/sclera: Conjunctivae normal.      Pupils: Pupils are equal, round, and reactive to light. Neck:      Vascular: No carotid bruit. Cardiovascular:      Rate and Rhythm: Normal rate and regular rhythm. Pulses: Normal pulses. Pulmonary:      Effort: Pulmonary effort is normal. No respiratory distress. Breath sounds: Normal breath sounds. No wheezing. Abdominal:      General: Bowel sounds are normal. There is no distension. Palpations: Abdomen is soft. There is no mass. Tenderness: There is no abdominal tenderness. There is no right CVA tenderness or left CVA tenderness. Musculoskeletal:      Thoracic back: Tenderness present. Lumbar back: Tenderness present. Back:       Right lower leg: No edema. Left lower leg: No edema. Comments: Along eddy lumbar and right scapular region   Lymphadenopathy:      Cervical: No cervical adenopathy. Skin:     General: Skin is warm and dry. Capillary Refill: Capillary refill takes less than 2 seconds. Neurological:      Mental Status: She is alert.    Psychiatric:         Mood and Affect: Mood normal.              --ABIGAIL Vines - MAHAD

## 2021-12-20 RX ORDER — ESCITALOPRAM OXALATE 20 MG/1
20 TABLET ORAL DAILY
Qty: 30 TABLET | Refills: 2 | Status: SHIPPED | OUTPATIENT
Start: 2021-12-20 | End: 2022-03-17

## 2021-12-20 NOTE — TELEPHONE ENCOUNTER
----- Message from [facility] sent at 12/20/2021  8:42 AM EST -----  Subject: Refill Request    QUESTIONS  Name of Medication? escitalopram (LEXAPRO) 20 MG tablet  Patient-reported dosage and instructions? 20 mg, take one by mouth daily  How many days do you have left? 0  Preferred Pharmacy? [facility] #[id number]  Pharmacy phone number (if available)? [phone]  ---------------------------------------------------------------------------  --------------  Tr SINGER  What is the best way for the office to contact you? OK to leave message on   voicemail  Preferred Call Back Phone Number?  [phone]

## 2021-12-29 ENCOUNTER — TELEPHONE (OUTPATIENT)
Dept: FAMILY MEDICINE CLINIC | Age: 61
End: 2021-12-29

## 2021-12-29 RX ORDER — NITROFURANTOIN 25; 75 MG/1; MG/1
100 CAPSULE ORAL 2 TIMES DAILY
Qty: 20 CAPSULE | Refills: 0 | Status: SHIPPED | OUTPATIENT
Start: 2021-12-29 | End: 2022-01-08

## 2021-12-29 NOTE — TELEPHONE ENCOUNTER
----- Message from Hayden Banegas sent at 12/29/2021  8:46 AM EST -----  Subject: Appointment Request    Reason for Call: Urgent Adult Urinary Problem    QUESTIONS  Type of Appointment? Established Patient  Reason for appointment request? Available appointments did not meet   patient need  Additional Information for Provider? Patient is calling with symptoms for   a week . burning, odor, pressure, possible uti. urgency. her mother in law   just passed away and cannot make it in. Can Dr. Ady Castellanos call something in   for her. pharmacy is [facility]. call her back   ---------------------------------------------------------------------------  --------------  CALL BACK INFO  What is the best way for the office to contact you? OK to leave message on   voicemail  Preferred Call Back Phone Number? [phone]  ---------------------------------------------------------------------------  --------------  SCRIPT ANSWERS  Relationship to Patient? Self  Have you recently (14 days) seen a provider for this problem? No  Have you been diagnosed with, awaiting test results for, or told that you   are suspected of having COVID-19 (Coronavirus)? (If patient has tested   negative or was tested as a requirement for work, school, or travel and   not based on symptoms, answer no)? No  Within the past two weeks have you developed any of the following symptoms   (answer no if symptoms have been present longer than 2 weeks or began   more than 2 weeks ago)? Fever or Chills, Cough, Shortness of breath or   difficulty breathing, Loss of taste or smell, Sore throat, Nasal   congestion, Sneezing or runny nose, Fatigue or generalized body aches   (answer no if pain is specific to a body part e.g. back pain), Diarrhea,   Headache? No  Have you had close contact with someone with COVID-19 in the last 14 days? No  (Service Expert  click yes below to proceed with [employer] As Usual   Scheduling)?  Yes

## 2022-01-12 DIAGNOSIS — J44.9 CHRONIC OBSTRUCTIVE PULMONARY DISEASE, UNSPECIFIED COPD TYPE (HCC): ICD-10-CM

## 2022-01-13 DIAGNOSIS — M51.36 DDD (DEGENERATIVE DISC DISEASE), LUMBAR: ICD-10-CM

## 2022-01-13 DIAGNOSIS — G89.4 CHRONIC PAIN SYNDROME: ICD-10-CM

## 2022-01-13 RX ORDER — HYDROCODONE BITARTRATE AND ACETAMINOPHEN 7.5; 325 MG/1; MG/1
1 TABLET ORAL EVERY 8 HOURS PRN
Qty: 90 TABLET | Refills: 0 | Status: SHIPPED | OUTPATIENT
Start: 2022-01-13 | End: 2022-02-16 | Stop reason: SDUPTHER

## 2022-01-13 NOTE — TELEPHONE ENCOUNTER
----- Message from Portia Patterson sent at 1/13/2022 11:01 AM EST -----  Subject: Refill Request    QUESTIONS  Name of Medication? HYDROcodone-acetaminophen (NORCO) 7.5-325 MG per   tablet  Patient-reported dosage and instructions? 7.5-325; Once every 8 hours  How many days do you have left? 4  Preferred Pharmacy? [id number]  Pharmacy phone number (if available)? [phone]  ---------------------------------------------------------------------------  --------------  Shantelle SINGER  What is the best way for the office to contact you? OK to leave message on   voicemail  Preferred Call Back Phone Number?  [phone]

## 2022-02-16 ENCOUNTER — TELEPHONE (OUTPATIENT)
Dept: FAMILY MEDICINE CLINIC | Age: 62
End: 2022-02-16

## 2022-02-16 DIAGNOSIS — G89.4 CHRONIC PAIN SYNDROME: ICD-10-CM

## 2022-02-16 DIAGNOSIS — M51.36 DDD (DEGENERATIVE DISC DISEASE), LUMBAR: ICD-10-CM

## 2022-02-16 RX ORDER — HYDROCODONE BITARTRATE AND ACETAMINOPHEN 7.5; 325 MG/1; MG/1
1 TABLET ORAL EVERY 8 HOURS PRN
Qty: 90 TABLET | Refills: 0 | Status: SHIPPED | OUTPATIENT
Start: 2022-02-16 | End: 2022-03-15 | Stop reason: SDUPTHER

## 2022-03-07 ENCOUNTER — TELEPHONE (OUTPATIENT)
Dept: FAMILY MEDICINE CLINIC | Age: 62
End: 2022-03-07

## 2022-03-07 NOTE — TELEPHONE ENCOUNTER
Pt stepped on a roman nail , it went thru her boot and into her foot, broke the skin, pt actually had to pull the nail out. Pt is wanting to know if she could get a tetanus shot?     Pl advise [phone] (home)

## 2022-03-08 ENCOUNTER — OFFICE VISIT (OUTPATIENT)
Dept: FAMILY MEDICINE CLINIC | Age: 62
End: 2022-03-08
Payer: COMMERCIAL

## 2022-03-08 VITALS
TEMPERATURE: 97.7 F | SYSTOLIC BLOOD PRESSURE: 110 MMHG | HEIGHT: 61 IN | DIASTOLIC BLOOD PRESSURE: 70 MMHG | HEART RATE: 63 BPM | WEIGHT: 123.8 LBS | BODY MASS INDEX: 23.37 KG/M2 | OXYGEN SATURATION: 98 %

## 2022-03-08 DIAGNOSIS — S99.921A INJURY OF RIGHT FOOT, INITIAL ENCOUNTER: Primary | ICD-10-CM

## 2022-03-08 PROCEDURE — 90471 IMMUNIZATION ADMIN: CPT | Performed by: NURSE PRACTITIONER

## 2022-03-08 PROCEDURE — 99212 OFFICE O/P EST SF 10 MIN: CPT | Performed by: NURSE PRACTITIONER

## 2022-03-08 PROCEDURE — 90715 TDAP VACCINE 7 YRS/> IM: CPT | Performed by: NURSE PRACTITIONER

## 2022-03-08 RX ORDER — CEPHALEXIN 500 MG/1
500 CAPSULE ORAL 3 TIMES DAILY
Qty: 21 CAPSULE | Refills: 0 | Status: SHIPPED | OUTPATIENT
Start: 2022-03-08 | End: 2022-03-15

## 2022-03-08 ASSESSMENT — ENCOUNTER SYMPTOMS
ABDOMINAL PAIN: 0
COUGH: 0
NAUSEA: 0
SHORTNESS OF BREATH: 0

## 2022-03-08 NOTE — PROGRESS NOTES
Jhon Arevalo (:  1960) is a 62 y.o. female,Established patient, here for evaluation of the following chief complaint(s): Foot Injury (stepped on nail right right foot )         ASSESSMENT/PLAN:  1. Injury of right foot, initial encounter  Up dated tdap in office, take antibiotics as prescribed. Return if symptoms worsen or fail to improve. Subjective   SUBJECTIVE/OBJECTIVE:  HPI   Presents to office today for right foot injury, states she stepped on a nail yesterday. She is not up to date on her tetanus. Reports rehabing an old house and stepped on a roman nail. She took advil last night and soaked in epsom salt with some relief. Constant ache, tender. Denies fevers, drainage or red streaking from the site    Current Outpatient Medications   Medication Sig Dispense Refill    cephALEXin (KEFLEX) 500 MG capsule Take 1 capsule by mouth 3 times daily for 7 days 21 capsule 0    HYDROcodone-acetaminophen (NORCO) 7.5-325 MG per tablet Take 1 tablet by mouth every 8 hours as needed for Pain for up to 30 days. 90 tablet 0    fluticasone-umeclidin-vilant (TRELEGY ELLIPTA) 100-62.5-25 MCG/INH AEPB Inhale 1 puff into the lungs daily 3 each 1    escitalopram (LEXAPRO) 20 MG tablet Take 1 tablet by mouth daily 30 tablet 2    tiZANidine (ZANAFLEX) 4 MG tablet 1/2 to 1 tablet tid prn 90 tablet 1    pantoprazole (PROTONIX) 40 MG tablet Take 1 tablet by mouth every morning (before breakfast) 30 tablet 5    lisinopril (PRINIVIL;ZESTRIL) 40 MG tablet Take 1 tablet by mouth daily 90 tablet 1    albuterol sulfate HFA (PROVENTIL HFA) 108 (90 Base) MCG/ACT inhaler Inhale 2 puffs into the lungs every 6 hours as needed for Wheezing 1 Inhaler 3     No current facility-administered medications for this visit.         Past Medical History:   Diagnosis Date    Anxiety     Chronic back pain     Chronic pain     Depression     GERD (gastroesophageal reflux disease)     Hyperlipidemia     Lumbar disc disease     [address] [address]     Migraine     Osteoarthritis     Sciatica of right side     Tobacco abuse     Unspecified essential hypertension         Review of Systems   Constitutional: Negative for activity change, fatigue and fever. Respiratory: Negative for cough and shortness of breath. Cardiovascular: Negative for leg swelling. Gastrointestinal: Negative for abdominal pain and nausea. Musculoskeletal: Positive for arthralgias (right foot). Neurological: Negative for weakness and headaches. Objective   Physical Exam  Constitutional:       General: She is not in acute distress. Appearance: Normal appearance. She is obese. She is not ill-appearing. HENT:      Head: Normocephalic and atraumatic. Cardiovascular:      Rate and Rhythm: Normal rate. Pulmonary:      Effort: Pulmonary effort is normal.   Musculoskeletal:         General: Tenderness (pad of right foot, below 2nd and 3rd toe) present. Skin:     General: Skin is warm and dry. Neurological:      Mental Status: She is alert.               --ABIGAIL Choe - NP

## 2022-03-08 NOTE — PATIENT INSTRUCTIONS
Patient Education        Tdap (Tetanus, Diphtheria, Pertussis) Vaccine: What You Need to Know  Why get vaccinated? Tdap vaccine can prevent tetanus, diphtheria, and pertussis. Diphtheria and pertussis spread from person to person. Tetanus enters the body through cuts or wounds. · TETANUS (T) causes painful stiffening of the muscles. Tetanus can lead to serious health problems, including being unable to open the mouth, having trouble swallowing and breathing, or death. · DIPHTHERIA (D) can lead to difficulty breathing, heart failure, paralysis, or death. · PERTUSSIS (aP), also known as \"whooping cough,\" can cause uncontrollable, violent coughing that makes it hard to breathe, eat, or drink. Pertussis can be extremely serious especially in babies and young children, causing pneumonia, convulsions, brain damage, or death. In teens and adults, it can cause weight loss, loss of bladder control, passing out, and rib fractures from severe coughing. Tdap vaccine  Tdap is only for children 7 years and older, adolescents, and adults. Adolescents should receive a single dose of Tdap, preferably at age 6 or 15 years. Pregnant people should get a dose of Tdap during every pregnancy, preferably during the early part of the third trimester, to help protect the  from pertussis. Infants are most at risk for severe, life-threatening complications from pertussis. Adults who have never received Tdap should get a dose of Tdap. Also, adults should receive a booster dose of either Tdap or Td (a different vaccine that protects against tetanus and diphtheria but not pertussis) every 10 years, or after 5 years in the case of a severe or dirty wound or burn. Tdap may be given at the same time as other vaccines.   Talk with your health care provider  Tell your vaccination provider if the person getting the vaccine:  · Has had an allergic reaction after a previous dose of any vaccine that protects against tetanus, diphtheria, or pertussis, or has any severe, life-threatening allergies  · Has had a coma, decreased level of consciousness, or prolonged seizures within 7 days after a previous dose of any pertussis vaccine (DTP, DTaP, or Tdap)  · Has seizures or another nervous system problem  · Has ever had Guillain-Barré Syndrome (also called \"GBS\")  · Has had severe pain or swelling after a previous dose of any vaccine that protects against tetanus or diphtheria  In some cases, your health care provider may decide to postpone Tdap vaccination until a future visit. People with minor illnesses, such as a cold, may be vaccinated. People who are moderately or severely ill should usually wait until they recover before getting Tdap vaccine. Your health care provider can give you more information. Risks of a vaccine reaction  · Pain, redness, or swelling where the shot was given, mild fever, headache, feeling tired, and nausea, vomiting, diarrhea, or stomachache sometimes happen after Tdap vaccination. People sometimes faint after medical procedures, including vaccination. Tell your provider if you feel dizzy or have vision changes or ringing in the ears. As with any medicine, there is a very remote chance of a vaccine causing a severe allergic reaction, other serious injury, or death. What if there is a serious problem? An allergic reaction could occur after the vaccinated person leaves the clinic. If you see signs of a severe allergic reaction (hives, swelling of the face and throat, difficulty breathing, a fast heartbeat, dizziness, or weakness), call 9-1-1 and get the person to the nearest hospital.  For other signs that concern you, call your health care provider. Adverse reactions should be reported to the Vaccine Adverse Event Reporting System (VAERS). Your health care provider will usually file this report, or you can do it yourself. Visit the VAERS website at www.vaers. hhs.gov or call 7-635.819.4668.  [facility] is only for reporting reactions, and [facility] staff members do not give medical advice. The National Vaccine Injury Compensation Program  The National Vaccine Injury Compensation Program (VICP) is a federal program that was created to compensate people who may have been injured by certain vaccines. Claims regarding alleged injury or death due to vaccination have a time limit for filing, which may be as short as two years. Visit the VICP website at www.[facility].gov/vaccinecompensation or call 4-472.236.7949 to learn about the program and about filing a claim. How can I learn more? · Ask your health care provider. · Call your local or state health department. · Visit the website of the Food and Drug Administration (FDA) for vaccine package inserts and additional information at www.fda.gov/vaccines-blood-biologics/vaccines. · Contact the Centers for Disease Control and Prevention (CDC):  ? Call 9-996.934.7339 (5-548-NLO-INFO) or  ? Visit CDC's website at www.cdc.gov/vaccines. Vaccine Information Statement  Tdap (Tetanus, Diphtheria, Pertussis) Vaccine  8/6/2021  [address] Marcela [address] [id number]  [facility] for Disease Control and [facility]  Many vaccine information statements are available in Italian and other languages. See www.immunize.org/vis  Hojas de información sobre vacunas están disponibles en español y en muchos otros idiomas. Visite www.immunize.org/vis  Care instructions adapted under license by [facility] ([facility]). If you have questions about a medical condition or this instruction, always ask your healthcare professional. [place] [address] any warranty or liability for your use of this information.

## 2022-03-15 ENCOUNTER — OFFICE VISIT (OUTPATIENT)
Dept: FAMILY MEDICINE CLINIC | Age: 62
End: 2022-03-15
Payer: COMMERCIAL

## 2022-03-15 VITALS
DIASTOLIC BLOOD PRESSURE: 70 MMHG | HEART RATE: 76 BPM | BODY MASS INDEX: 22.86 KG/M2 | OXYGEN SATURATION: 98 % | RESPIRATION RATE: 16 BRPM | SYSTOLIC BLOOD PRESSURE: 108 MMHG | WEIGHT: 121 LBS | TEMPERATURE: 98 F

## 2022-03-15 DIAGNOSIS — M51.9 LUMBAR DISC DISEASE: ICD-10-CM

## 2022-03-15 DIAGNOSIS — I10 ESSENTIAL HYPERTENSION: ICD-10-CM

## 2022-03-15 DIAGNOSIS — K21.9 GASTROESOPHAGEAL REFLUX DISEASE, UNSPECIFIED WHETHER ESOPHAGITIS PRESENT: ICD-10-CM

## 2022-03-15 DIAGNOSIS — R73.9 HYPERGLYCEMIA: ICD-10-CM

## 2022-03-15 DIAGNOSIS — M50.90 CERVICAL DISC DISEASE: ICD-10-CM

## 2022-03-15 DIAGNOSIS — F51.01 PRIMARY INSOMNIA: ICD-10-CM

## 2022-03-15 DIAGNOSIS — Z13.220 SCREENING CHOLESTEROL LEVEL: ICD-10-CM

## 2022-03-15 DIAGNOSIS — G89.4 CHRONIC PAIN SYNDROME: Primary | ICD-10-CM

## 2022-03-15 DIAGNOSIS — J44.9 CHRONIC OBSTRUCTIVE PULMONARY DISEASE, UNSPECIFIED COPD TYPE (HCC): ICD-10-CM

## 2022-03-15 DIAGNOSIS — M54.31 RIGHT SIDED SCIATICA: ICD-10-CM

## 2022-03-15 LAB
A/G RATIO: 2 (ref 1.1–2.2)
ALBUMIN SERPL-MCNC: 4.7 G/DL (ref 3.4–5)
ALP BLD-CCNC: 97 U/L (ref 40–129)
ALT SERPL-CCNC: 9 U/L (ref 10–40)
ANION GAP SERPL CALCULATED.3IONS-SCNC: 18 MMOL/L (ref 3–16)
AST SERPL-CCNC: 14 U/L (ref 15–37)
BASOPHILS ABSOLUTE: 0.1 K/UL (ref 0–0.2)
BASOPHILS RELATIVE PERCENT: 0.7 %
BILIRUB SERPL-MCNC: 0.3 MG/DL (ref 0–1)
BUN BLDV-MCNC: 14 MG/DL (ref 7–20)
CALCIUM SERPL-MCNC: 9.8 MG/DL (ref 8.3–10.6)
CHLORIDE BLD-SCNC: 103 MMOL/L (ref 99–110)
CHOLESTEROL, TOTAL: 223 MG/DL (ref 0–199)
CO2: 21 MMOL/L (ref 21–32)
CREAT SERPL-MCNC: 0.7 MG/DL (ref 0.6–1.2)
EOSINOPHILS ABSOLUTE: 0.2 K/UL (ref 0–0.6)
EOSINOPHILS RELATIVE PERCENT: 2.2 %
GFR AFRICAN AMERICAN: >60
GFR NON-AFRICAN AMERICAN: >60
GLUCOSE BLD-MCNC: 96 MG/DL (ref 70–99)
HCT VFR BLD CALC: 42.7 % (ref 36–48)
HDLC SERPL-MCNC: 42 MG/DL (ref 40–60)
HEMOGLOBIN: 14.2 G/DL (ref 12–16)
LDL CHOLESTEROL CALCULATED: 146 MG/DL
LYMPHOCYTES ABSOLUTE: 2.5 K/UL (ref 1–5.1)
LYMPHOCYTES RELATIVE PERCENT: 33.8 %
MCH RBC QN AUTO: 32.9 PG (ref 26–34)
MCHC RBC AUTO-ENTMCNC: 33.3 G/DL (ref 31–36)
MCV RBC AUTO: 98.8 FL (ref 80–100)
MONOCYTES ABSOLUTE: 0.5 K/UL (ref 0–1.3)
MONOCYTES RELATIVE PERCENT: 6.6 %
NEUTROPHILS ABSOLUTE: 4.1 K/UL (ref 1.7–7.7)
NEUTROPHILS RELATIVE PERCENT: 56.7 %
PDW BLD-RTO: 13.4 % (ref 12.4–15.4)
PLATELET # BLD: 311 K/UL (ref 135–450)
PMV BLD AUTO: 7.7 FL (ref 5–10.5)
POTASSIUM SERPL-SCNC: 4.2 MMOL/L (ref 3.5–5.1)
RBC # BLD: 4.33 M/UL (ref 4–5.2)
SODIUM BLD-SCNC: 142 MMOL/L (ref 136–145)
TOTAL PROTEIN: 7.1 G/DL (ref 6.4–8.2)
TRIGL SERPL-MCNC: 177 MG/DL (ref 0–150)
VLDLC SERPL CALC-MCNC: 35 MG/DL
WBC # BLD: 7.3 K/UL (ref 4–11)

## 2022-03-15 PROCEDURE — 99214 OFFICE O/P EST MOD 30 MIN: CPT | Performed by: INTERNAL MEDICINE

## 2022-03-15 RX ORDER — TRAZODONE HYDROCHLORIDE 50 MG/1
TABLET ORAL
Qty: 30 TABLET | Refills: 5 | Status: SHIPPED | OUTPATIENT
Start: 2022-03-15 | End: 2022-08-23

## 2022-03-15 RX ORDER — HYDROCODONE BITARTRATE AND ACETAMINOPHEN 7.5; 325 MG/1; MG/1
1 TABLET ORAL EVERY 8 HOURS PRN
Qty: 90 TABLET | Refills: 0 | Status: SHIPPED | OUTPATIENT
Start: 2022-03-15 | End: 2022-04-18 | Stop reason: SDUPTHER

## 2022-03-15 RX ORDER — LISINOPRIL 40 MG/1
40 TABLET ORAL DAILY
Qty: 90 TABLET | Refills: 1 | Status: SHIPPED | OUTPATIENT
Start: 2022-03-15 | End: 2022-04-18

## 2022-03-15 RX ORDER — ALBUTEROL SULFATE 90 UG/1
2 AEROSOL, METERED RESPIRATORY (INHALATION) EVERY 6 HOURS PRN
Qty: 1 EACH | Refills: 3 | Status: SHIPPED | OUTPATIENT
Start: 2022-03-15

## 2022-03-15 ASSESSMENT — PATIENT HEALTH QUESTIONNAIRE - PHQ9
SUM OF ALL RESPONSES TO PHQ QUESTIONS 1-9: 0
SUM OF ALL RESPONSES TO PHQ QUESTIONS 1-9: 6
SUM OF ALL RESPONSES TO PHQ QUESTIONS 1-9: 0
1. LITTLE INTEREST OR PLEASURE IN DOING THINGS: 0
2. FEELING DOWN, DEPRESSED OR HOPELESS: 0
7. TROUBLE CONCENTRATING ON THINGS, SUCH AS READING THE NEWSPAPER OR WATCHING TELEVISION: 0
9. THOUGHTS THAT YOU WOULD BE BETTER OFF DEAD, OR OF HURTING YOURSELF: 0
SUM OF ALL RESPONSES TO PHQ QUESTIONS 1-9: 6
2. FEELING DOWN, DEPRESSED OR HOPELESS: 0
SUM OF ALL RESPONSES TO PHQ QUESTIONS 1-9: 6
SUM OF ALL RESPONSES TO PHQ9 QUESTIONS 1 & 2: 0
SUM OF ALL RESPONSES TO PHQ QUESTIONS 1-9: 6
4. FEELING TIRED OR HAVING LITTLE ENERGY: 3
SUM OF ALL RESPONSES TO PHQ QUESTIONS 1-9: 0
6. FEELING BAD ABOUT YOURSELF - OR THAT YOU ARE A FAILURE OR HAVE LET YOURSELF OR YOUR FAMILY DOWN: 0
3. TROUBLE FALLING OR STAYING ASLEEP: 3
10. IF YOU CHECKED OFF ANY PROBLEMS, HOW DIFFICULT HAVE THESE PROBLEMS MADE IT FOR YOU TO DO YOUR WORK, TAKE CARE OF THINGS AT HOME, OR GET ALONG WITH OTHER PEOPLE: 1
1. LITTLE INTEREST OR PLEASURE IN DOING THINGS: 0
8. MOVING OR SPEAKING SO SLOWLY THAT OTHER PEOPLE COULD HAVE NOTICED. OR THE OPPOSITE, BEING SO FIGETY OR RESTLESS THAT YOU HAVE BEEN MOVING AROUND A LOT MORE THAN USUAL: 0
SUM OF ALL RESPONSES TO PHQ QUESTIONS 1-9: 0
SUM OF ALL RESPONSES TO PHQ9 QUESTIONS 1 & 2: 0
5. POOR APPETITE OR OVEREATING: 0

## 2022-03-15 NOTE — PROGRESS NOTES
SUBJECTIVE:  Brittany Montes is a 62 y.o. female being evaluated for:    Chief Complaint   Patient presents with    Chronic Pain     Patient here for 3 month med check for refills on pain meds. 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  Who has     Unable to straighten up when first gets up  Neck is bothering her  Pain down her arms and down her right leg  Breathing mostly good except for 1 night with one issue  Has seen ortho cincy  Injections 2 years ago did help and they were considering surgery on her low back    Insomnia and trazodone helped before   Allergies   Allergen Reactions    Avelox [Moxifloxacin] Swelling and Rash    Sulfa Antibiotics Swelling and Rash    Chantix [Varenicline] Other (See Comments)     irritability and worsening depression     Codeine Nausea Only     Current Outpatient Medications   Medication Sig Dispense Refill    escitalopram (LEXAPRO) 20 MG tablet TAKE 1 TABLET BY MOUTH DAILY 90 tablet 1    albuterol sulfate HFA (PROVENTIL HFA) 108 (90 Base) MCG/ACT inhaler Inhale 2 puffs into the lungs every 6 hours as needed for Wheezing or Shortness of Breath 1 each 3    lisinopril (PRINIVIL;ZESTRIL) 40 MG tablet Take 1 tablet by mouth daily 90 tablet 1    traZODone (DESYREL) 50 MG tablet One to two tablets at bedtime as needed for sleep 30 tablet 5    HYDROcodone-acetaminophen (NORCO) 7.5-325 MG per tablet Take 1 tablet by mouth every 8 hours as needed for Pain for up to 30 days. 90 tablet 0    fluticasone-umeclidin-vilant (TRELEGY ELLIPTA) 100-62.5-25 MCG/INH AEPB Inhale 1 puff into the lungs daily 3 each 1    tiZANidine (ZANAFLEX) 4 MG tablet 1/2 to 1 tablet tid prn 90 tablet 1    pantoprazole (PROTONIX) 40 MG tablet Take 1 tablet by mouth every morning (before breakfast) 30 tablet 5     No current facility-administered medications for this visit.          Social History     Socioeconomic History    Marital status:  Diagnosis Date    Anxiety     Chronic back pain     Chronic pain     Depression     GERD (gastroesophageal reflux disease)     Hyperlipidemia     Lumbar disc disease     Melanoma ([address]     Migraine     Osteoarthritis     Sciatica of right side     Tobacco abuse     Unspecified essential hypertension      Past Surgical History:   Procedure Laterality Date    APPENDECTOMY      KNEE SURGERY Left     SHOULDER SURGERY Left     SKIN CANCER DESTRUCTION  02/2018    melanoma removed from right leg       Review of Systems   Constitutional: Positive for activity change. Negative for fatigue and unexpected weight change (losing with the increased labor ). HENT: Negative for nosebleeds. Respiratory: Negative for cough, shortness of breath and wheezing. Cardiovascular: Negative for chest pain, palpitations and leg swelling. Gastrointestinal: Positive for constipation (chronic ). Negative for abdominal pain, nausea and vomiting. Genitourinary: Negative for dysuria. Musculoskeletal: Positive for back pain and neck pain. Neurological: Negative for dizziness, light-headedness and headaches. Sciatica    Psychiatric/Behavioral: Positive for sleep disturbance. Negative for dysphoric mood. OBJECTIVE:  /70 (Site: Right Upper Arm, Position: Sitting, Cuff Size: Medium Adult)   Pulse 76   Temp 98 °F (36.7 °C) (Oral)   Resp 16   Wt 121 lb (54.9 kg)   LMP  (LMP Unknown)   SpO2 98%   BMI 22.86 kg/m²      Body mass index is 22.86 kg/m². Physical Exam  Constitutional:       General: She is not in acute distress. Appearance: Normal appearance. She is well-developed. HENT:      Head: Normocephalic and atraumatic. Nose: Nose normal.      Mouth/Throat:      Pharynx: Oropharynx is clear. Eyes:      Conjunctiva/sclera: Conjunctivae normal.   Neck:      Thyroid: No thyromegaly. Vascular: No JVD. Cardiovascular:      Rate and Rhythm: Normal rate and regular rhythm. Heart sounds: Normal heart sounds. No murmur heard. No friction rub. No gallop. Pulmonary:      Effort: Pulmonary effort is normal.      Breath sounds: Normal breath sounds. No wheezing. Chest:      Chest wall: No tenderness. Abdominal:      General: There is no distension. Palpations: Abdomen is soft. Tenderness: There is no abdominal tenderness. Comments: No HSM   Genitourinary:     Vagina: Normal.      Comments: No ovarian fullness or mass,  Musculoskeletal:         General: Tenderness (right para spinal ) present. No swelling. Cervical back: Neck supple. Tenderness (low mid neck  and upper back) present. Muscular tenderness present. Comments: Normal rom of neck  Normal back flexion but decreased extension    Lymphadenopathy:      Cervical: No cervical adenopathy. Skin:     General: Skin is warm and dry. Findings: No rash. Neurological:      General: No focal deficit present. Mental Status: She is alert. Gait: Gait normal.   Psychiatric:         Mood and Affect: Mood normal.         Behavior: Behavior normal.         Thought Content: Thought content normal.         ASSESSMENT/PLAN:    Elena Montalvo was seen today for chronic pain and medication refill. Diagnoses and all orders for this visit:    Chronic pain syndrome reviewed oarrs and pain form completed  Increasing pain with increased activity  To do imaging and may need to resee ortho cincy   -     HYDROcodone-acetaminophen (NORCO) 7.5-325 MG per tablet; Take 1 tablet by mouth every 8 hours as needed for Pain for up to 30 days. Cervical disc disease  -     MRI CERVICAL SPINE WO CONTRAST; Future    Lumbar disc disease  -     MRI LUMBAR SPINE WO CONTRAST; Future    Right sided sciatica  -     MRI LUMBAR SPINE WO CONTRAST; Future    Essential hypertension controlled   -     lisinopril (PRINIVIL;ZESTRIL) 40 MG tablet;  Take 1 tablet by mouth daily    Chronic obstructive pulmonary disease, unspecified COPD type ([facility] still smoking and discussed cessation  -     albuterol sulfate HFA (PROVENTIL HFA) 108 (90 Base) MCG/ACT inhaler; Inhale 2 puffs into the lungs every 6 hours as needed for Wheezing or Shortness of Breath    Primary insomnia  -     traZODone (DESYREL) 50 MG tablet; One to two tablets at bedtime as needed for sleep        Orders Placed This Encounter   Medications    albuterol sulfate HFA (PROVENTIL HFA) 108 (90 Base) MCG/ACT inhaler     Sig: Inhale 2 puffs into the lungs every 6 hours as needed for Wheezing or Shortness of Breath     Dispense:  1 each     Refill:  3    lisinopril (PRINIVIL;ZESTRIL) 40 MG tablet     Sig: Take 1 tablet by mouth daily     Dispense:  90 tablet     Refill:  1    traZODone (DESYREL) 50 MG tablet     Sig: One to two tablets at bedtime as needed for sleep     Dispense:  30 tablet     Refill:  5    HYDROcodone-acetaminophen (NORCO) 7.5-325 MG per tablet     Sig: Take 1 tablet by mouth every 8 hours as needed for Pain for up to 30 days. Dispense:  90 tablet     Refill:  0     Reduce doses taken as pain becomes manageable        Return in about 3 months (around 6/15/2022), or if symptoms worsen or fail to improve, for pain follow up . There are no Patient Instructions on file for this visit.

## 2022-03-16 LAB
ESTIMATED AVERAGE GLUCOSE: 114 MG/DL
HBA1C MFR BLD: 5.6 %

## 2022-03-17 DIAGNOSIS — M50.90 CERVICAL DISC DISEASE: ICD-10-CM

## 2022-03-17 RX ORDER — ESCITALOPRAM OXALATE 20 MG/1
20 TABLET ORAL DAILY
Qty: 90 TABLET | Refills: 1 | Status: SHIPPED | OUTPATIENT
Start: 2022-03-17 | End: 2022-09-19

## 2022-03-17 RX ORDER — ESCITALOPRAM OXALATE 20 MG/1
20 TABLET ORAL DAILY
Qty: 30 TABLET | Refills: 2 | OUTPATIENT
Start: 2022-03-17

## 2022-03-20 ASSESSMENT — ENCOUNTER SYMPTOMS
ABDOMINAL PAIN: 0
NAUSEA: 0
COUGH: 0
SHORTNESS OF BREATH: 0
VOMITING: 0
CONSTIPATION: 1
BACK PAIN: 1
WHEEZING: 0

## 2022-03-21 RX ORDER — TIZANIDINE 4 MG/1
TABLET ORAL
Qty: 90 TABLET | Refills: 1 | Status: SHIPPED | OUTPATIENT
Start: 2022-03-21

## 2022-03-31 ENCOUNTER — TELEPHONE (OUTPATIENT)
Dept: FAMILY MEDICINE CLINIC | Age: 62
End: 2022-03-31

## 2022-03-31 RX ORDER — FLUCONAZOLE 100 MG/1
100 TABLET ORAL DAILY
Qty: 7 TABLET | Refills: 0 | Status: SHIPPED | OUTPATIENT
Start: 2022-03-31 | End: 2022-04-07

## 2022-03-31 NOTE — TELEPHONE ENCOUNTER
Patient was just seen by her dentist and told she has a yeast infection in her mouth. He is not comfortable prescribing something for that and asked patient to call her pcp.       [facility] [place]      Please call, [phone]

## 2022-03-31 NOTE — TELEPHONE ENCOUNTER
Pt had an appt this morning with her dentist Elizabeth Alan, he told the pt that she has a bacterial infection yeast or thrush and to call her pcp for treatment. Pt's palate and tongue are white, no sores.

## 2022-04-18 ENCOUNTER — TELEPHONE (OUTPATIENT)
Dept: FAMILY MEDICINE CLINIC | Age: 62
End: 2022-04-18

## 2022-04-18 DIAGNOSIS — I10 ESSENTIAL HYPERTENSION: ICD-10-CM

## 2022-04-18 DIAGNOSIS — G89.4 CHRONIC PAIN SYNDROME: ICD-10-CM

## 2022-04-18 RX ORDER — LISINOPRIL 40 MG/1
TABLET ORAL
Qty: 90 TABLET | Refills: 1 | Status: SHIPPED | OUTPATIENT
Start: 2022-04-18 | End: 2022-06-13 | Stop reason: ALTCHOICE

## 2022-04-18 RX ORDER — HYDROCODONE BITARTRATE AND ACETAMINOPHEN 7.5; 325 MG/1; MG/1
1 TABLET ORAL EVERY 8 HOURS PRN
Qty: 90 TABLET | Refills: 0 | Status: SHIPPED | OUTPATIENT
Start: 2022-04-18 | End: 2022-05-16 | Stop reason: SDUPTHER

## 2022-04-18 NOTE — TELEPHONE ENCOUNTER
----- Message from Marge Hong sent at 0/04/8027  9:48 AM EDT -----  Subject: Refill Request    QUESTIONS  Name of Medication? HYDROcodone-acetaminophen (NORCO) 7.5-325 MG per   tablet  Patient-reported dosage and instructions? 1x day or as needed  How many days do you have left? 0  Preferred [facility]acy? [id number]  Pharmacy phone number (if available)? [phone]  Additional Information for Provider? Please expedite  ---------------------------------------------------------------------------  --------------  CALL BACK INFO  What is the best way for the office to contact you? OK to leave message on   voicemail  Preferred Call Back Phone Number? [phone]  ---------------------------------------------------------------------------  --------------  SCRIPT ANSWERS  Relationship to Patient?  Self

## 2022-05-13 DIAGNOSIS — M50.90 CERVICAL DISC DISEASE: ICD-10-CM

## 2022-05-13 RX ORDER — TIZANIDINE 4 MG/1
TABLET ORAL
Qty: 90 TABLET | Refills: 1 | OUTPATIENT
Start: 2022-05-13

## 2022-05-16 ENCOUNTER — TELEPHONE (OUTPATIENT)
Dept: FAMILY MEDICINE CLINIC | Age: 62
End: 2022-05-16

## 2022-05-16 DIAGNOSIS — G89.4 CHRONIC PAIN SYNDROME: ICD-10-CM

## 2022-05-16 RX ORDER — HYDROCODONE BITARTRATE AND ACETAMINOPHEN 7.5; 325 MG/1; MG/1
1 TABLET ORAL EVERY 8 HOURS PRN
Qty: 90 TABLET | Refills: 0 | Status: SHIPPED | OUTPATIENT
Start: 2022-05-16 | End: 2022-06-13 | Stop reason: SDUPTHER

## 2022-05-16 NOTE — TELEPHONE ENCOUNTER
----- Message from Lynn BustilloFight My Monster sent at 5/16/2022  1:12 PM EDT -----  Subject: Refill Request    QUESTIONS  Name of Medication? HYDROcodone-acetaminophen (NORCO) 7.5-325 MG per   tablet  Patient-reported dosage and instructions? 1x day every 8hrs or as needed   How many days do you have left? 1  [facility]erred [facility]? [id number]  Pharmacy phone number (if available)? [phone]  ---------------------------------------------------------------------------  --------------  Deannie Sandhoff INFO  What is the best way for the office to contact you? OK to leave message on   voicemail  Preferred Call Back Phone Number? [phone]  ---------------------------------------------------------------------------  --------------  SCRIPT ANSWERS  Relationship to Patient?  Self

## 2022-06-13 ENCOUNTER — OFFICE VISIT (OUTPATIENT)
Dept: FAMILY MEDICINE CLINIC | Age: 62
End: 2022-06-13
Payer: COMMERCIAL

## 2022-06-13 VITALS
HEART RATE: 74 BPM | BODY MASS INDEX: 21.65 KG/M2 | WEIGHT: 114.6 LBS | DIASTOLIC BLOOD PRESSURE: 62 MMHG | SYSTOLIC BLOOD PRESSURE: 96 MMHG | RESPIRATION RATE: 16 BRPM | OXYGEN SATURATION: 97 % | TEMPERATURE: 98.3 F

## 2022-06-13 DIAGNOSIS — I10 ESSENTIAL HYPERTENSION: ICD-10-CM

## 2022-06-13 DIAGNOSIS — F17.200 SMOKER: ICD-10-CM

## 2022-06-13 DIAGNOSIS — R63.4 WEIGHT LOSS: ICD-10-CM

## 2022-06-13 DIAGNOSIS — M51.9 LUMBAR DISC DISEASE: ICD-10-CM

## 2022-06-13 DIAGNOSIS — R63.4 WEIGHT LOSS: Primary | ICD-10-CM

## 2022-06-13 DIAGNOSIS — J44.9 CHRONIC OBSTRUCTIVE PULMONARY DISEASE, UNSPECIFIED COPD TYPE (HCC): ICD-10-CM

## 2022-06-13 DIAGNOSIS — G89.4 CHRONIC PAIN SYNDROME: ICD-10-CM

## 2022-06-13 PROCEDURE — 99214 OFFICE O/P EST MOD 30 MIN: CPT | Performed by: INTERNAL MEDICINE

## 2022-06-13 RX ORDER — HYDROCODONE BITARTRATE AND ACETAMINOPHEN 7.5; 325 MG/1; MG/1
1 TABLET ORAL EVERY 8 HOURS PRN
Qty: 90 TABLET | Refills: 0 | Status: SHIPPED | OUTPATIENT
Start: 2022-06-13 | End: 2022-07-14 | Stop reason: SDUPTHER

## 2022-06-13 SDOH — ECONOMIC STABILITY: FOOD INSECURITY: WITHIN THE PAST 12 MONTHS, THE FOOD YOU BOUGHT JUST DIDN'T LAST AND YOU DIDN'T HAVE MONEY TO GET MORE.: NEVER TRUE

## 2022-06-13 SDOH — ECONOMIC STABILITY: FOOD INSECURITY: WITHIN THE PAST 12 MONTHS, YOU WORRIED THAT YOUR FOOD WOULD RUN OUT BEFORE YOU GOT MONEY TO BUY MORE.: NEVER TRUE

## 2022-06-13 ASSESSMENT — PATIENT HEALTH QUESTIONNAIRE - PHQ9
7. TROUBLE CONCENTRATING ON THINGS, SUCH AS READING THE NEWSPAPER OR WATCHING TELEVISION: 0
1. LITTLE INTEREST OR PLEASURE IN DOING THINGS: 0
SUM OF ALL RESPONSES TO PHQ QUESTIONS 1-9: 0
2. FEELING DOWN, DEPRESSED OR HOPELESS: 0
10. IF YOU CHECKED OFF ANY PROBLEMS, HOW DIFFICULT HAVE THESE PROBLEMS MADE IT FOR YOU TO DO YOUR WORK, TAKE CARE OF THINGS AT HOME, OR GET ALONG WITH OTHER PEOPLE: 0
5. POOR APPETITE OR OVEREATING: 0
3. TROUBLE FALLING OR STAYING ASLEEP: 0
SUM OF ALL RESPONSES TO PHQ QUESTIONS 1-9: 0
6. FEELING BAD ABOUT YOURSELF - OR THAT YOU ARE A FAILURE OR HAVE LET YOURSELF OR YOUR FAMILY DOWN: 0
4. FEELING TIRED OR HAVING LITTLE ENERGY: 0
SUM OF ALL RESPONSES TO PHQ9 QUESTIONS 1 & 2: 0
8. MOVING OR SPEAKING SO SLOWLY THAT OTHER PEOPLE COULD HAVE NOTICED. OR THE OPPOSITE, BEING SO FIGETY OR RESTLESS THAT YOU HAVE BEEN MOVING AROUND A LOT MORE THAN USUAL: 0
9. THOUGHTS THAT YOU WOULD BE BETTER OFF DEAD, OR OF HURTING YOURSELF: 0
SUM OF ALL RESPONSES TO PHQ QUESTIONS 1-9: 0
SUM OF ALL RESPONSES TO PHQ QUESTIONS 1-9: 0

## 2022-06-13 ASSESSMENT — ENCOUNTER SYMPTOMS
BACK PAIN: 1
CONSTIPATION: 1
NAUSEA: 0
DIARRHEA: 0
ABDOMINAL PAIN: 0
VOMITING: 0

## 2022-06-13 ASSESSMENT — SOCIAL DETERMINANTS OF HEALTH (SDOH): HOW HARD IS IT FOR YOU TO PAY FOR THE VERY BASICS LIKE FOOD, HOUSING, MEDICAL CARE, AND HEATING?: NOT HARD AT ALL

## 2022-06-13 NOTE — PROGRESS NOTES
SUBJECTIVE:  Génesis Duque is a 62 y.o. female being evaluated for:    Chief Complaint   Patient presents with    Chronic Pain     3 month med check for refills on pain meds.  Hypertension     pt lowered Lisinopril to 20 mg one week ago due to episodes of dizziness. The dizziness has subsided     Hyperlipidemia       HPI   Blood pressure had been a problem  Has been dizzy for a while and lowered her lisinopril to 20 mg and dizziness is better  No headaches nose bleeds cp or dizziness  Has been losing weight working so much  Lots of physical work Eating less   No gi symptoms feels fine     CT of chest stable in 11/21  Screening is up to date Hx of melanoma and has not seen derm in a while      Pain across low back  No pain down legs  NO difficulties with urination  Chronic constipation  Has actually improved Increased as helped  fix a tractor requiring lifting  Seeing chiropractor and suggested NS consultation   Allergies   Allergen Reactions    Avelox [Moxifloxacin] Swelling and Rash    Sulfa Antibiotics Swelling and Rash    Chantix [Varenicline] Other (See Comments)     irritability and worsening depression     Codeine Nausea Only     Current Outpatient Medications   Medication Sig Dispense Refill    HYDROcodone-acetaminophen (NORCO) 7.5-325 MG per tablet Take 1 tablet by mouth every 8 hours as needed for Pain for up to 30 days.  90 tablet 0    tiZANidine (ZANAFLEX) 4 MG tablet 1/2 to 1 tablet tid prn 90 tablet 1    escitalopram (LEXAPRO) 20 MG tablet TAKE 1 TABLET BY MOUTH DAILY 90 tablet 1    albuterol sulfate HFA (PROVENTIL HFA) 108 (90 Base) MCG/ACT inhaler Inhale 2 puffs into the lungs every 6 hours as needed for Wheezing or Shortness of Breath 1 each 3    traZODone (DESYREL) 50 MG tablet One to two tablets at bedtime as needed for sleep 30 tablet 5    fluticasone-umeclidin-vilant (TRELEGY ELLIPTA) 100-62.5-25 MCG/INH AEPB Inhale 1 puff into the lungs daily 3 each 1    pantoprazole (PROTONIX) 40 MG tablet Take 1 tablet by mouth every morning (before breakfast) 30 tablet 5     No current facility-administered medications for this visit. Social History     Socioeconomic History    Marital status:      Spouse name: Not on file    Number of children: Not on file    Years of education: Not on file    Highest education level: Not on file   Occupational History    Not on file   Tobacco Use    Smoking status: Current Every Day Smoker     Packs/day: 0.75     Years: 34.00     Pack years: 25.50     Types: Cigarettes     Start date: 1/1/1985    Smokeless tobacco: Never Used   Vaping Use    Vaping Use: Never used   Substance and Sexual Activity    Alcohol use: No    Drug use: Never    Sexual activity: Yes     Partners: Male   Other Topics Concern    Not on file   Social History Narrative    Not on file     Social Determinants of Health     Financial Resource Strain: Low Risk     Difficulty of Paying Living Expenses: Not hard at all   Food Insecurity: No Food Insecurity    Worried About [address] in the Last Year: Never true    [address] in the Last Year: Never true   Transportation Needs:     Lack of Transportation (Medical): Not on file    Lack of Transportation (Non-Medical):  Not on file   Physical Activity:     Days of Exercise per Week: Not on file    Minutes of Exercise per Session: Not on file   Stress:     Feeling of Stress : Not on file   Social Connections:     Frequency of Communication with Friends and Family: Not on file    Frequency of Social Gatherings with Friends and Family: Not on file    Attends Druze Services: Not on file    Active Member of Clubs or Organizations: Not on file    Attends Club or Organization Meetings: Not on file    Marital Status: Not on file   Intimate Partner Violence:     Fear of Current or Ex-Partner: Not on file    Emotionally Abused: Not on file    Physically Abused: Not on file    Sexually Abused: Not on file   Housing Stability:     Unable to Pay for Housing in the Last Year: Not on file    Number of [place] in the Last Year: Not on file    Unstable Housing in the Last Year: Not on file      Past Medical History:   Diagnosis Date    Anxiety     Chronic back pain     Chronic pain     Depression     GERD (gastroesophageal reflux disease)     Hyperlipidemia     Lumbar disc disease     [facility] [address] [facility]     Migraine     Osteoarthritis     Sciatica of right side     Tobacco abuse     Unspecified essential hypertension      Past Surgical History:   Procedure Laterality Date    APPENDECTOMY      KNEE SURGERY Left     SHOULDER SURGERY Left     SKIN CANCER DESTRUCTION  02/2018    melanoma removed from right leg       Review of Systems   Constitutional: Positive for activity change (increased work mai ready for move ) and unexpected weight change (loss around 25#). HENT: Negative for nosebleeds. Cardiovascular: Negative for chest pain, palpitations and leg swelling. Gastrointestinal: Positive for constipation (chronic and improved ). Negative for abdominal pain, diarrhea, nausea and vomiting. Genitourinary: Negative for difficulty urinating and dysuria. Musculoskeletal: Positive for back pain. Neurological: Positive for dizziness, light-headedness and headaches. No sciatica    Psychiatric/Behavioral: Positive for sleep disturbance. Negative for dysphoric mood (doing well ). OBJECTIVE:  BP 96/62 (Site: Left Upper Arm, Position: Sitting, Cuff Size: Medium Adult)   Pulse 74   Temp 98.3 °F (36.8 °C) (Oral)   Resp 16   Wt 114 lb 9.6 oz (52 kg)   LMP  (LMP Unknown)   SpO2 97%   BMI 21.65 kg/m²      Body mass index is 21.65 kg/m². Physical Exam  Constitutional:       General: She is not in acute distress. Appearance: Normal appearance. She is well-developed. HENT:      Head: Normocephalic and atraumatic.       Right Ear: External ear normal.      Left Ear: External ear normal.      Nose: Nose normal.      Mouth/Throat:      Pharynx: Oropharynx is clear. Eyes:      Conjunctiva/sclera: Conjunctivae normal.   Neck:      Thyroid: No thyromegaly. Vascular: No JVD. Cardiovascular:      Rate and Rhythm: Normal rate and regular rhythm. Heart sounds: Normal heart sounds. No murmur heard. No friction rub. No gallop. Pulmonary:      Effort: Pulmonary effort is normal.      Breath sounds: Wheezing (greater in left lower lobe ) present. Chest:      Chest wall: No tenderness. Abdominal:      General: Bowel sounds are normal. There is no distension. Palpations: Abdomen is soft. There is no mass. Tenderness: There is no abdominal tenderness. Comments: No HSM   Musculoskeletal:         General: Tenderness (across low back ) present. No swelling or deformity. Normal range of motion. Cervical back: Normal range of motion and neck supple. Muscular tenderness present. Comments: Normal flexion and extension of back    Lymphadenopathy:      Cervical: No cervical adenopathy. Skin:     General: Skin is warm and dry. Findings: No rash. Neurological:      General: No focal deficit present. Mental Status: She is alert and oriented to person, place, and time. Cranial Nerves: No cranial nerve deficit. Coordination: Coordination normal.      Gait: Gait normal.   Psychiatric:         Mood and Affect: Mood normal.         Behavior: Behavior normal.         Thought Content: Thought content normal.         ASSESSMENT/PLAN:    Saud Ken was seen today for chronic pain, hypertension and hyperlipidemia. Diagnoses and all orders for this visit:    Weight loss negative screening tests and ct normal of lung within the year   Eat more protein and follow up   -     TSH; Future    Chronic pain syndrome  -     HYDROcodone-acetaminophen (NORCO) 7.5-325 MG per tablet;  Take 1 tablet by mouth every 8 hours as needed for Pain for up to 30 days.  -     [facility] - Kuldip Hernandez MD, Neurosurgery, [facility]    Lumbar disc disease  -     AFL - Kuldip Hernandez MD, Neurosurgery, [facility]    Essential hypertension  Comments:  with weight loss dizzy and low bp to discontinue ace     Chronic obstructive pulmonary disease, unspecified COPD type ([facility]  [id number] [address] with inhalers    Smoker discussed cessation Not ready         Orders Placed This Encounter   Medications    HYDROcodone-acetaminophen (NORCO) 7.5-325 MG per tablet     Sig: Take 1 tablet by mouth every 8 hours as needed for Pain for up to 30 days. Dispense:  90 tablet     Refill:  0     Reduce doses taken as pain becomes manageable        Return in about 3 months (around 9/13/2022), or if symptoms worsen or fail to improve, for med follow up and weight follow up . There are no Patient Instructions on file for this visit.

## 2022-06-14 LAB — TSH SERPL DL<=0.05 MIU/L-ACNC: 1.89 UIU/ML (ref 0.27–4.2)

## 2022-07-14 ENCOUNTER — TELEPHONE (OUTPATIENT)
Dept: FAMILY MEDICINE CLINIC | Age: 62
End: 2022-07-14

## 2022-07-14 DIAGNOSIS — G89.4 CHRONIC PAIN SYNDROME: ICD-10-CM

## 2022-07-14 RX ORDER — HYDROCODONE BITARTRATE AND ACETAMINOPHEN 7.5; 325 MG/1; MG/1
1 TABLET ORAL EVERY 8 HOURS PRN
Qty: 90 TABLET | Refills: 0 | Status: SHIPPED | OUTPATIENT
Start: 2022-07-15 | End: 2022-08-17 | Stop reason: SDUPTHER

## 2022-07-14 NOTE — TELEPHONE ENCOUNTER
Pt is needing a refill on     HYDROcodone-acetaminophen (NORCO) 7.5-325 MG per tablet     Pt stated that she has to use [facility] now b/c their insurance changed on July 1, 2022.          [facility] [place]      advise     LOV   6.13.2022  NOV   [phone]

## 2022-08-09 ENCOUNTER — PATIENT MESSAGE (OUTPATIENT)
Dept: FAMILY MEDICINE CLINIC | Age: 62
End: 2022-08-09

## 2022-08-09 DIAGNOSIS — J40 BRONCHITIS: ICD-10-CM

## 2022-08-09 DIAGNOSIS — J40 BRONCHITIS: Primary | ICD-10-CM

## 2022-08-09 RX ORDER — AZITHROMYCIN 250 MG/1
250 TABLET, FILM COATED ORAL SEE ADMIN INSTRUCTIONS
Qty: 6 TABLET | Refills: 0 | Status: SHIPPED | OUTPATIENT
Start: 2022-08-09 | End: 2022-08-09 | Stop reason: SDUPTHER

## 2022-08-09 RX ORDER — METHYLPREDNISOLONE 4 MG/1
TABLET ORAL
Qty: 1 KIT | Refills: 0 | Status: SHIPPED | OUTPATIENT
Start: 2022-08-09 | End: 2022-08-15

## 2022-08-09 RX ORDER — AZITHROMYCIN 250 MG/1
250 TABLET, FILM COATED ORAL SEE ADMIN INSTRUCTIONS
Qty: 6 TABLET | Refills: 0 | Status: SHIPPED | OUTPATIENT
Start: 2022-08-09 | End: 2022-08-14

## 2022-08-09 RX ORDER — METHYLPREDNISOLONE 4 MG/1
TABLET ORAL
Qty: 1 KIT | Refills: 0 | Status: SHIPPED | OUTPATIENT
Start: 2022-08-09 | End: 2022-08-09 | Stop reason: SDUPTHER

## 2022-08-09 NOTE — TELEPHONE ENCOUNTER
From: Joy Pritchett  To: Dr. Clark Freshwater: 8/9/2022 3:54 PM EDT  Subject: Covid    Im pretty sure I have Covid the test the government sent is negative but I have low grade temperature 100.4 body aches cold chills headache Im just concerned because of my COPD.  Any advise

## 2022-08-17 ENCOUNTER — TELEPHONE (OUTPATIENT)
Dept: FAMILY MEDICINE CLINIC | Age: 62
End: 2022-08-17

## 2022-08-17 ENCOUNTER — PATIENT MESSAGE (OUTPATIENT)
Dept: FAMILY MEDICINE CLINIC | Age: 62
End: 2022-08-17

## 2022-08-17 DIAGNOSIS — G89.4 CHRONIC PAIN SYNDROME: ICD-10-CM

## 2022-08-17 RX ORDER — FLUCONAZOLE 100 MG/1
100 TABLET ORAL DAILY
Qty: 7 TABLET | Refills: 0 | Status: SHIPPED | OUTPATIENT
Start: 2022-08-17 | End: 2022-08-24

## 2022-08-17 RX ORDER — HYDROCODONE BITARTRATE AND ACETAMINOPHEN 7.5; 325 MG/1; MG/1
1 TABLET ORAL EVERY 8 HOURS PRN
Qty: 90 TABLET | Refills: 0 | Status: SHIPPED | OUTPATIENT
Start: 2022-08-17 | End: 2022-09-13 | Stop reason: SDUPTHER

## 2022-08-17 NOTE — TELEPHONE ENCOUNTER
From: Ziggy Allen  To: Dr. Lance Brooklyn: 8/17/2022 9:51 AM EDT  Subject: Yeast in mouth     I have had this yeast in my mouth for months that nystatin didnt do anything I wake up in the morning and the roof of my mouth is covered in it can you please give me something strong enough to get rid of it Im also coughing up chunks of it Thanks

## 2022-08-17 NOTE — TELEPHONE ENCOUNTER
Pt is needing a refill on   HYDROcodone-acetaminophen (NORCO) 7.5-325 MG per tablet   [facility]/pharmacy #[id number]- [place], [place] - [address]  Messi Armando [phone] - F [phone] VITAL SIGNS    Telemetry:    Vital Signs Last 24 Hrs  T(C): 36.7 (18 @ 04:48), Max: 36.9 (18 @ 19:58)  T(F): 98.1 (18 @ 04:48), Max: 98.4 (18 @ 19:58)  HR: 76 (18 @ 04:48) (71 - 76)  BP: 122/67 (18 @ 04:48) (116/66 - 127/57)  RR: 18 (18 @ 04:48) (18 - 18)  SpO2: 99% (18 @ 04:48) (99% - 100%)             @ 07:01  -   @ 07:00  --------------------------------------------------------  IN: 1400 mL / OUT: 2500 mL / NET: -1100 mL       Daily     Daily Weight in k.8 (04 Mar 2018 11:00)  Admit Wt: Drug Dosing Weight  Height (cm): 170.18 (2018 21:46)  Weight (kg): 75.4 (2018 21:46)  BMI (kg/m2): 26 (2018 21:46)  BSA (m2): 1.87 (2018 21:46)     Daily Weight in k.8 (18 @ 11:00)    LABS      136  |  102  |  16  ----------------------------<  90  3.7   |  25  |  0.91    Ca    7.9<L>      04 Mar 2018 06:11    TPro  6.1  /  Alb  2.3<L>  /  TBili  0.6  /  DBili  x   /  AST  28  /  ALT  34  /  AlkPhos  158<H>                                   9.4    10.8  )-----------( 372      ( 04 Mar 2018 06:11 )             28.9          PT/INR - ( 04 Mar 2018 06:11 )   PT: 11.3 sec;   INR: 1.05 ratio         PTT - ( 04 Mar 2018 06:11 )  PTT:32.5 sec        Bilirubin Total, Serum: 0.6 mg/dL ( @ 06:11)    CAPILLARY BLOOD GLUCOSE      POCT Blood Glucose.: 139 mg/dL (04 Mar 2018 12:14)  POCT Blood Glucose.: 95 mg/dL (04 Mar 2018 08:09)  POCT Blood Glucose.: 136 mg/dL (03 Mar 2018 21:27)  POCT Blood Glucose.: 164 mg/dL (03 Mar 2018 16:39)          Drains:     MS       [  ]   [  ]            L Pleural [  ]            R Pleural  [  ]            YESSICA  [  ]           Dimas  [  ]    Pacing Wires      [  ]   Settings:                                  Isolated  [  ]                    CXR:      MEDICATIONS  acetaminophen   Tablet 650 milliGRAM(s) Oral every 6 hours PRN  acetaminophen   Tablet. 650 milliGRAM(s) Oral every 6 hours PRN  amiodarone    Tablet 200 milliGRAM(s) Oral daily  aspirin 325 milliGRAM(s) Oral daily  atorvastatin 80 milliGRAM(s) Oral at bedtime  finasteride 5 milliGRAM(s) Oral daily  folic acid 1 milliGRAM(s) Oral daily  insulin lispro (HumaLOG) corrective regimen sliding scale   SubCutaneous Before meals and at bedtime  levothyroxine 150 MICROGram(s) Oral daily  lidocaine 5% Ointment 1 Application(s) Topical every 12 hours  metoprolol     tartrate 25 milliGRAM(s) Oral every 12 hours  pantoprazole    Tablet 40 milliGRAM(s) Oral before breakfast  tamsulosin 0.4 milliGRAM(s) Oral at bedtime  thiamine 100 milliGRAM(s) Oral daily      PHYSICAL EXAM      Neurology: alert and oriented x 3, nonfocal, no gross deficits  CV :S1S2  Sternal Wound :  Stable, Mid sternum opened no drainage Vac applied today by PT wound  Lungs: CTA b/l  Abdomen: soft, nontender, nondistended, positive bowel sounds, last bowel movement   :   +Dimas  Extremities:   no edema +PP b/l         PAST MEDICAL & SURGICAL HISTORY:  High cholesterol  Diabetes: type 2  No significant past surgical history                 Discussed with Cardiothoracic Team at AM rounds. VITAL SIGNS    Telemetry:  SR  Vital Signs Last 24 Hrs  T(C): 36.7 (18 @ 04:48), Max: 36.9 (18 @ 19:58)  T(F): 98.1 (18 @ 04:48), Max: 98.4 (18 @ 19:58)  HR: 76 (18 @ 04:48) (71 - 76)  BP: 122/67 (18 @ 04:48) (116/66 - 127/57)  RR: 18 (18 @ 04:48) (18 - 18)  SpO2: 99% (18 @ 04:48) (99% - 100%)             @ 07:01  -   @ 07:00  --------------------------------------------------------  IN: 1400 mL / OUT: 2500 mL / NET: -1100 mL       Daily     Daily Weight in k.8 (04 Mar 2018 11:00)  Admit Wt: Drug Dosing Weight  Height (cm): 170.18 (2018 21:46)  Weight (kg): 75.4 (2018 21:46)  BMI (kg/m2): 26 (2018 21:46)  BSA (m2): 1.87 (2018 21:46)     Daily Weight in k.8 (18 @ 11:00)    LABS      136  |  102  |  16  ----------------------------<  90  3.7   |  25  |  0.91    Ca    7.9<L>      04 Mar 2018 06:11    TPro  6.1  /  Alb  2.3<L>  /  TBili  0.6  /  DBili  x   /  AST  28  /  ALT  34  /  AlkPhos  158<H>                                   9.4    10.8  )-----------( 372      ( 04 Mar 2018 06:11 )             28.9          PT/INR - ( 04 Mar 2018 06:11 )   PT: 11.3 sec;   INR: 1.05 ratio         PTT - ( 04 Mar 2018 06:11 )  PTT:32.5 sec        Bilirubin Total, Serum: 0.6 mg/dL ( @ 06:11)    CAPILLARY BLOOD GLUCOSE      POCT Blood Glucose.: 139 mg/dL (04 Mar 2018 12:14)  POCT Blood Glucose.: 95 mg/dL (04 Mar 2018 08:09)  POCT Blood Glucose.: 136 mg/dL (03 Mar 2018 21:27)  POCT Blood Glucose.: 164 mg/dL (03 Mar 2018 16:39)          Drains:     MS       [  ]   [  ]            L Pleural [  ]            R Pleural  [  ]            YESSICA  [  ]           Dimas  [  ]    Pacing Wires      [  ]   Settings:                                  Isolated  [  ]                    CXR:      MEDICATIONS  acetaminophen   Tablet 650 milliGRAM(s) Oral every 6 hours PRN  acetaminophen   Tablet. 650 milliGRAM(s) Oral every 6 hours PRN  amiodarone    Tablet 200 milliGRAM(s) Oral daily  aspirin 325 milliGRAM(s) Oral daily  atorvastatin 80 milliGRAM(s) Oral at bedtime  finasteride 5 milliGRAM(s) Oral daily  folic acid 1 milliGRAM(s) Oral daily  insulin lispro (HumaLOG) corrective regimen sliding scale   SubCutaneous Before meals and at bedtime  levothyroxine 150 MICROGram(s) Oral daily  lidocaine 5% Ointment 1 Application(s) Topical every 12 hours  metoprolol     tartrate 25 milliGRAM(s) Oral every 12 hours  pantoprazole    Tablet 40 milliGRAM(s) Oral before breakfast  tamsulosin 0.4 milliGRAM(s) Oral at bedtime  thiamine 100 milliGRAM(s) Oral daily      PHYSICAL EXAM      Neurology: alert and oriented x 3, nonfocal, no gross deficits  CV :S1S2  Sternal Wound :  Stable, Mid sternum opened no drainage Vac applied today by PT wound  Lungs: CTA b/l  Abdomen: soft, nontender, nondistended, positive bowel sounds, last bowel movement   :   +Dimas  Extremities:   no edema +PP b/l         PAST MEDICAL & SURGICAL HISTORY:  High cholesterol  Diabetes: type 2  No significant past surgical history                 Discussed with Cardiothoracic Team at AM rounds.

## 2022-08-23 RX ORDER — TRAZODONE HYDROCHLORIDE 50 MG/1
TABLET ORAL
Qty: 30 TABLET | Refills: 3 | Status: SHIPPED | OUTPATIENT
Start: 2022-08-23 | End: 2022-09-06

## 2022-09-06 RX ORDER — TRAZODONE HYDROCHLORIDE 50 MG/1
TABLET ORAL
Qty: 30 TABLET | Refills: 3 | Status: SHIPPED | OUTPATIENT
Start: 2022-09-06 | End: 2022-09-20

## 2022-09-13 ENCOUNTER — OFFICE VISIT (OUTPATIENT)
Dept: FAMILY MEDICINE CLINIC | Age: 62
End: 2022-09-13
Payer: COMMERCIAL

## 2022-09-13 VITALS
DIASTOLIC BLOOD PRESSURE: 78 MMHG | BODY MASS INDEX: 21.69 KG/M2 | HEART RATE: 74 BPM | SYSTOLIC BLOOD PRESSURE: 138 MMHG | WEIGHT: 114.8 LBS | TEMPERATURE: 98.5 F | OXYGEN SATURATION: 98 % | RESPIRATION RATE: 16 BRPM

## 2022-09-13 DIAGNOSIS — M50.90 CERVICAL DISC DISEASE: ICD-10-CM

## 2022-09-13 DIAGNOSIS — F17.218 CIGARETTE NICOTINE DEPENDENCE WITH OTHER NICOTINE-INDUCED DISORDER: ICD-10-CM

## 2022-09-13 DIAGNOSIS — M51.16 LUMBAR DISC DISEASE WITH RADICULOPATHY: ICD-10-CM

## 2022-09-13 DIAGNOSIS — I10 ESSENTIAL HYPERTENSION: ICD-10-CM

## 2022-09-13 DIAGNOSIS — J44.9 CHRONIC OBSTRUCTIVE PULMONARY DISEASE, UNSPECIFIED COPD TYPE (HCC): ICD-10-CM

## 2022-09-13 DIAGNOSIS — G89.4 CHRONIC PAIN SYNDROME: Primary | ICD-10-CM

## 2022-09-13 PROCEDURE — G0296 VISIT TO DETERM LDCT ELIG: HCPCS | Performed by: INTERNAL MEDICINE

## 2022-09-13 PROCEDURE — 99214 OFFICE O/P EST MOD 30 MIN: CPT | Performed by: INTERNAL MEDICINE

## 2022-09-13 RX ORDER — HYDROCODONE BITARTRATE AND ACETAMINOPHEN 7.5; 325 MG/1; MG/1
1 TABLET ORAL EVERY 8 HOURS PRN
Qty: 90 TABLET | Refills: 0 | Status: SHIPPED | OUTPATIENT
Start: 2022-09-14 | End: 2022-10-18 | Stop reason: SDUPTHER

## 2022-09-13 RX ORDER — PREDNISONE 20 MG/1
TABLET ORAL
Qty: 11 TABLET | Refills: 0 | Status: SHIPPED | OUTPATIENT
Start: 2022-09-13

## 2022-09-13 ASSESSMENT — PATIENT HEALTH QUESTIONNAIRE - PHQ9
6. FEELING BAD ABOUT YOURSELF - OR THAT YOU ARE A FAILURE OR HAVE LET YOURSELF OR YOUR FAMILY DOWN: 0
8. MOVING OR SPEAKING SO SLOWLY THAT OTHER PEOPLE COULD HAVE NOTICED. OR THE OPPOSITE, BEING SO FIGETY OR RESTLESS THAT YOU HAVE BEEN MOVING AROUND A LOT MORE THAN USUAL: 0
5. POOR APPETITE OR OVEREATING: 0
1. LITTLE INTEREST OR PLEASURE IN DOING THINGS: 0
3. TROUBLE FALLING OR STAYING ASLEEP: 0
SUM OF ALL RESPONSES TO PHQ9 QUESTIONS 1 & 2: 0
SUM OF ALL RESPONSES TO PHQ QUESTIONS 1-9: 0
2. FEELING DOWN, DEPRESSED OR HOPELESS: 0
SUM OF ALL RESPONSES TO PHQ QUESTIONS 1-9: 0
7. TROUBLE CONCENTRATING ON THINGS, SUCH AS READING THE NEWSPAPER OR WATCHING TELEVISION: 0
9. THOUGHTS THAT YOU WOULD BE BETTER OFF DEAD, OR OF HURTING YOURSELF: 0
10. IF YOU CHECKED OFF ANY PROBLEMS, HOW DIFFICULT HAVE THESE PROBLEMS MADE IT FOR YOU TO DO YOUR WORK, TAKE CARE OF THINGS AT HOME, OR GET ALONG WITH OTHER PEOPLE: 0
4. FEELING TIRED OR HAVING LITTLE ENERGY: 0

## 2022-09-13 NOTE — PROGRESS NOTES
SUBJECTIVE:  Leonarda Li is a 62 y.o. female being evaluated for:    Chief Complaint   Patient presents with    3 Month Follow-Up     Follow up on HTN and med check for pain med refills. HPI  Has hurt her back  Lifted tractor with  and felt a pop in her back and now bad pain in her right side  cannot get it to go away as soon as she does anything it worsens  Has MRI orders for her back  Neck hurting  No pain down her arm  Pain down her right leg tingling Muscle relaxers not helping    Has lost weight eating okay   NO gi symptoms    Ct scan for smoking Thick mucous cough Not more wheezying or sob   Allergies   Allergen Reactions    Avelox [Moxifloxacin] Swelling and Rash    Sulfa Antibiotics Swelling and Rash    Chantix [Varenicline] Other (See Comments)     irritability and worsening depression     Codeine Nausea Only     Current Outpatient Medications   Medication Sig Dispense Refill    predniSONE (DELTASONE) 20 MG tablet 2 pills daily for 3 days, 1 pill daily for 3 days and 1/2 daily for 3 days 11 tablet 0    HYDROcodone-acetaminophen (NORCO) 7.5-325 MG per tablet Take 1 tablet by mouth every 8 hours as needed for Pain for up to 30 days. 90 tablet 0    fluticasone-umeclidin-vilant (TRELEGY ELLIPTA) 100-62.5-25 MCG/INH AEPB Inhale 1 puff into the lungs daily 3 each 1    traZODone (DESYREL) 50 MG tablet TAKE 1 TO 2 TABLETS BY MOUTH AT BEDTIME AS NEEDED FOR SLEEP 30 tablet 3    tiZANidine (ZANAFLEX) 4 MG tablet 1/2 to 1 tablet tid prn 90 tablet 1    escitalopram (LEXAPRO) 20 MG tablet TAKE 1 TABLET BY MOUTH DAILY 90 tablet 1    albuterol sulfate HFA (PROVENTIL HFA) 108 (90 Base) MCG/ACT inhaler Inhale 2 puffs into the lungs every 6 hours as needed for Wheezing or Shortness of Breath 1 each 3     No current facility-administered medications for this visit.          Social History     Socioeconomic History    Marital status:      Spouse name: Not on file    Number of children: Not on file Years of education: Not on file    Highest education level: Not on file   Occupational History    Not on file   Tobacco Use    Smoking status: Every Day     Packs/day: 0.75     Years: 34.00     Pack years: 25.50     Types: Cigarettes     Start date: 1/1/1985    Smokeless tobacco: Never   Vaping Use    Vaping Use: Never used   Substance and Sexual Activity    Alcohol use: No    Drug use: Never    Sexual activity: Yes     Partners: Male   Other Topics Concern    Not on file   Social History Narrative    Not on file     Social Determinants of Health     Financial Resource Strain: Low Risk     Difficulty of Paying Living Expenses: Not hard at all   Food Insecurity: No Food Insecurity    Worried About Running Out of Food in the Last Year: Never true    Ran Out of Food in the Last Year: Never true   Transportation Needs: Not on file   Physical Activity: Not on file   Stress: Not on file   Social Connections: Not on file   Intimate Partner Violence: Not on file   Housing Stability: Not on file      Past Medical History:   Diagnosis Date    Anxiety     Chronic back pain     Chronic pain     Depression     GERD (gastroesophageal reflux disease)     Hyperlipidemia     Lumbar disc disease     Melanoma ([email or web address]     Migraine     Osteoarthritis     Sciatica of right side     Tobacco abuse     Unspecified essential hypertension      Past Surgical History:   Procedure Laterality Date    APPENDECTOMY      KNEE SURGERY Left     SHOULDER SURGERY Left     SKIN CANCER DESTRUCTION  02/2018    melanoma removed from right leg       Review of Systems   Constitutional:  Positive for activity change (increased work mai ready for move ), appetite change and unexpected weight change (loss). HENT:  Negative for nosebleeds. Respiratory:  Positive for cough. Negative for shortness of breath and wheezing. Cardiovascular:  Negative for chest pain, palpitations and leg swelling.    Gastrointestinal:  Positive for constipation (chronic and improved ). Negative for abdominal pain, diarrhea, nausea and vomiting. Endocrine: Negative for cold intolerance and heat intolerance. Genitourinary:  Negative for difficulty urinating and dysuria. Musculoskeletal:  Positive for back pain and neck pain. Neurological:  Positive for light-headedness and headaches. Negative for dizziness. Right sciatica    Psychiatric/Behavioral:  Positive for sleep disturbance. Negative for dysphoric mood (doing well ). OBJECTIVE:  /78 (Site: Left Upper Arm, Position: Sitting, Cuff Size: Medium Adult)   Pulse 74   Temp 98.5 °F (36.9 °C) (Oral)   Resp 16   Wt 114 lb 12.8 oz (52.1 kg)   LMP  (LMP Unknown)   SpO2 98%   BMI 21.69 kg/m²      Body mass index is 21.69 kg/m². Physical Exam  Constitutional:       General: She is not in acute distress. Appearance: Normal appearance. She is well-developed. HENT:      Head: Normocephalic and atraumatic. Eyes:      Conjunctiva/sclera: Conjunctivae normal.   Neck:      Thyroid: No thyromegaly. Vascular: No JVD. Cardiovascular:      Rate and Rhythm: Normal rate and regular rhythm. Heart sounds: Normal heart sounds. No murmur heard. No friction rub. No gallop. Pulmonary:      Effort: Pulmonary effort is normal.      Breath sounds: Wheezing (greater in left lower lobe ) present. Chest:      Chest wall: No tenderness. Abdominal:      General: Bowel sounds are normal. There is no distension. Palpations: Abdomen is soft. There is no mass. Tenderness: There is no abdominal tenderness. Comments: No HSM   Musculoskeletal:         General: Tenderness (across low back ) present. No swelling or deformity. Cervical back: Normal range of motion and neck supple. Muscular tenderness present. Comments: decreased flexion and extension of back    Lymphadenopathy:      Cervical: No cervical adenopathy. Skin:     General: Skin is warm and dry. Findings: No rash. Neurological:      General: No focal deficit present. Mental Status: She is alert. Gait: Gait normal.       ASSESSMENT/PLAN:    Indra Horta was seen today for 3 month follow-up. Diagnoses and all orders for this visit:    Chronic pain syndrome worse with injury needs reimaging    -     HYDROcodone-acetaminophen (NORCO) 7.5-325 MG per tablet; Take 1 tablet by mouth every 8 hours as needed for Pain for up to 30 days. Lumbar disc disease with radiculopathy  -     MRI LUMBAR SPINE WO CONTRAST; Future  -     predniSONE (DELTASONE) 20 MG tablet; 2 pills daily for 3 days, 1 pill daily for 3 days and 1/2 daily for 3 days    Cervical disc disease  -     MRI CERVICAL SPINE WO CONTRAST; Future   Controlled     Essential hypertension    Chronic obstructive pulmonary disease, [facility]  -     fluticasone-umeclidin-vilant (Dorena Sabal) 100-62.5-25 MCG/INH AEPB; Inhale 1 puff into the lungs daily    Cigarette nicotine dependence with other nicotine-induced disorder  -     HI VISIT TO DISCUSS LUNG CA SCREEN W LDCT  -     CT Lung Screen (Annual); Future      Orders Placed This Encounter   Medications    predniSONE (DELTASONE) 20 MG tablet     Si pills daily for 3 days, 1 pill daily for 3 days and 1/2 daily for 3 days     Dispense:  11 tablet     Refill:  0    HYDROcodone-acetaminophen (NORCO) 7.5-325 MG per tablet     Sig: Take 1 tablet by mouth every 8 hours as needed for Pain for up to 30 days. Dispense:  90 tablet     Refill:  0     Reduce doses taken as pain becomes manageable    fluticasone-umeclidin-vilant (TRELEGY ELLIPTA) 100-62.5-25 MCG/INH AEPB     Sig: Inhale 1 puff into the lungs daily     Dispense:  3 each     Refill:  1        Return in about 3 months (around 2022), or if symptoms worsen or fail to improve. Patient Instructions   What is lung cancer screening?   Lung cancer screening is a way in which doctors check the lungs for early signs of cancer in people who sign or symptoms of lung cancer   > 11 months since last LDCT     Risks and benefits of lung cancer screening with LDCT scans discussed:    Significance of positive screen - False-positive LDCT results often occur. 95% of all positive results do not lead to a diagnosis of cancer. Usually further imaging can resolve most false-positive results; however, some patients may require invasive procedures. Over diagnosis risk - 10% to 12% of screen-detected lung cancer cases are over diagnosed--that is, the cancer would not have been detected in the patient's lifetime without the screening. Need for follow up screens annually to continue lung cancer screening effectiveness     Risks associated with radiation from annual LDCT- Radiation exposure is about the same as for a mammogram, which is about 1/3 of the annual background radiation exposure from everyday life. Starting screening at age 54 is not likely to increase cancer risk from radiation exposure. Patients with comorbidities resulting in life expectancy of < 10 years, or that would preclude treatment of an abnormality identified on CT, should not be screened due to lack of benefit.     To obtain maximal benefit from this screening, smoking cessation and long-term abstinence from smoking is critical

## 2022-09-17 ASSESSMENT — ENCOUNTER SYMPTOMS
DIARRHEA: 0
COUGH: 1
WHEEZING: 0
NAUSEA: 0
ABDOMINAL PAIN: 0
SHORTNESS OF BREATH: 0
CONSTIPATION: 1
VOMITING: 0
BACK PAIN: 1

## 2022-09-19 RX ORDER — ESCITALOPRAM OXALATE 20 MG/1
20 TABLET ORAL DAILY
Qty: 90 TABLET | Refills: 1 | Status: SHIPPED | OUTPATIENT
Start: 2022-09-19

## 2022-09-20 RX ORDER — TRAZODONE HYDROCHLORIDE 50 MG/1
TABLET ORAL
Qty: 30 TABLET | Refills: 3 | Status: SHIPPED | OUTPATIENT
Start: 2022-09-20 | End: 2022-10-06

## 2022-09-26 ENCOUNTER — HOSPITAL ENCOUNTER (OUTPATIENT)
Dept: MRI IMAGING | Age: 62
Discharge: HOME | End: 2022-09-26
Payer: COMMERCIAL

## 2022-09-26 ENCOUNTER — HOSPITAL ENCOUNTER (OUTPATIENT)
Dept: CT IMAGING | Age: 62
Discharge: HOME | End: 2022-09-26
Payer: COMMERCIAL

## 2022-09-26 DIAGNOSIS — M51.16 LUMBAR DISC DISEASE WITH RADICULOPATHY: ICD-10-CM

## 2022-09-26 DIAGNOSIS — F17.218 CIGARETTE NICOTINE DEPENDENCE WITH OTHER NICOTINE-INDUCED DISORDER: ICD-10-CM

## 2022-09-26 DIAGNOSIS — M50.90 CERVICAL DISC DISEASE: ICD-10-CM

## 2022-09-26 PROCEDURE — 72141 MRI NECK SPINE W/O DYE: CPT

## 2022-09-26 PROCEDURE — 71271 CT THORAX LUNG CANCER SCR C-: CPT

## 2022-09-26 PROCEDURE — 72148 MRI LUMBAR SPINE W/O DYE: CPT

## 2022-10-06 RX ORDER — TRAZODONE HYDROCHLORIDE 50 MG/1
TABLET ORAL
Qty: 30 TABLET | Refills: 2 | Status: SHIPPED | OUTPATIENT
Start: 2022-10-06

## 2022-10-18 DIAGNOSIS — G89.4 CHRONIC PAIN SYNDROME: ICD-10-CM

## 2022-10-18 RX ORDER — HYDROCODONE BITARTRATE AND ACETAMINOPHEN 7.5; 325 MG/1; MG/1
1 TABLET ORAL EVERY 8 HOURS PRN
Qty: 90 TABLET | Refills: 0 | Status: SHIPPED | OUTPATIENT
Start: 2022-10-18 | End: 2022-11-17

## 2022-10-26 RX ORDER — TRAZODONE HYDROCHLORIDE 50 MG/1
TABLET ORAL
Qty: 30 TABLET | Refills: 2 | OUTPATIENT
Start: 2022-10-26

## 2022-10-27 RX ORDER — TRAZODONE HYDROCHLORIDE 50 MG/1
TABLET ORAL
Qty: 30 TABLET | Refills: 2 | OUTPATIENT
Start: 2022-10-27

## 2022-11-10 DIAGNOSIS — J44.9 CHRONIC OBSTRUCTIVE PULMONARY DISEASE, UNSPECIFIED COPD TYPE (HCC): ICD-10-CM

## 2022-11-17 DIAGNOSIS — G89.4 CHRONIC PAIN SYNDROME: ICD-10-CM

## 2022-11-17 RX ORDER — HYDROCODONE BITARTRATE AND ACETAMINOPHEN 7.5; 325 MG/1; MG/1
1 TABLET ORAL EVERY 8 HOURS PRN
Qty: 90 TABLET | Refills: 0 | Status: CANCELLED | OUTPATIENT
Start: 2022-11-17 | End: 2022-12-17

## 2022-11-18 ENCOUNTER — TELEPHONE (OUTPATIENT)
Dept: FAMILY MEDICINE CLINIC | Age: 62
End: 2022-11-18

## 2022-11-18 DIAGNOSIS — G89.4 CHRONIC PAIN SYNDROME: ICD-10-CM

## 2022-11-18 RX ORDER — HYDROCODONE BITARTRATE AND ACETAMINOPHEN 7.5; 325 MG/1; MG/1
1 TABLET ORAL EVERY 8 HOURS PRN
Qty: 90 TABLET | Refills: 0 | Status: SHIPPED | OUTPATIENT
Start: 2022-11-18 | End: 2022-12-18

## 2022-11-18 NOTE — TELEPHONE ENCOUNTER
Patient sent an e request for hydrocodone refill Last filled 10/18   Last visit 9/13 and next visit 12/13

## 2022-11-22 RX ORDER — TRAZODONE HYDROCHLORIDE 50 MG/1
TABLET ORAL
Qty: 30 TABLET | Refills: 2 | Status: SHIPPED | OUTPATIENT
Start: 2022-11-22

## 2022-12-05 RX ORDER — TRAZODONE HYDROCHLORIDE 50 MG/1
TABLET ORAL
Qty: 30 TABLET | Refills: 2 | OUTPATIENT
Start: 2022-12-05

## 2022-12-19 DIAGNOSIS — J44.9 CHRONIC OBSTRUCTIVE PULMONARY DISEASE, UNSPECIFIED COPD TYPE (HCC): ICD-10-CM

## 2022-12-20 DIAGNOSIS — G89.4 CHRONIC PAIN SYNDROME: ICD-10-CM

## 2022-12-20 RX ORDER — HYDROCODONE BITARTRATE AND ACETAMINOPHEN 7.5; 325 MG/1; MG/1
1 TABLET ORAL EVERY 8 HOURS PRN
Qty: 90 TABLET | Refills: 0 | Status: SHIPPED | OUTPATIENT
Start: 2022-12-20 | End: 2023-01-19

## 2022-12-20 NOTE — TELEPHONE ENCOUNTER
----- Message from Luigi Ackerman sent at 12/20/2022 12:41 PM EST -----  Regarding: Need refill  I need a refill on my pain medication please Than you

## 2022-12-30 RX ORDER — TRAZODONE HYDROCHLORIDE 50 MG/1
TABLET ORAL
Qty: 30 TABLET | Refills: 2 | OUTPATIENT
Start: 2022-12-30

## 2023-01-12 RX ORDER — TRAZODONE HYDROCHLORIDE 50 MG/1
TABLET ORAL
Qty: 30 TABLET | Refills: 2 | OUTPATIENT
Start: 2023-01-12

## 2023-01-12 RX ORDER — TRAZODONE HYDROCHLORIDE 50 MG/1
TABLET ORAL
Qty: 30 TABLET | Refills: 2 | Status: SHIPPED | OUTPATIENT
Start: 2023-01-12

## 2023-01-12 RX ORDER — ESCITALOPRAM OXALATE 20 MG/1
TABLET ORAL
Qty: 90 TABLET | Refills: 1 | Status: SHIPPED | OUTPATIENT
Start: 2023-01-12

## 2023-01-12 NOTE — TELEPHONE ENCOUNTER
Spoke with pharmacist and pt has filled the 2 refills on this prescription already. Sent over refill on Trazadone.

## 2023-01-19 ENCOUNTER — OFFICE VISIT (OUTPATIENT)
Dept: FAMILY MEDICINE CLINIC | Age: 63
End: 2023-01-19
Payer: COMMERCIAL

## 2023-01-19 VITALS
OXYGEN SATURATION: 97 % | WEIGHT: 114 LBS | SYSTOLIC BLOOD PRESSURE: 122 MMHG | TEMPERATURE: 97.8 F | RESPIRATION RATE: 16 BRPM | BODY MASS INDEX: 21.54 KG/M2 | HEART RATE: 86 BPM | DIASTOLIC BLOOD PRESSURE: 72 MMHG

## 2023-01-19 DIAGNOSIS — M50.90 CERVICAL DISC DISEASE: Primary | ICD-10-CM

## 2023-01-19 DIAGNOSIS — J30.9 ALLERGIC RHINITIS, UNSPECIFIED SEASONALITY, UNSPECIFIED TRIGGER: ICD-10-CM

## 2023-01-19 DIAGNOSIS — G89.4 CHRONIC PAIN SYNDROME: ICD-10-CM

## 2023-01-19 DIAGNOSIS — M51.16 LUMBAR DISC DISEASE WITH RADICULOPATHY: ICD-10-CM

## 2023-01-19 DIAGNOSIS — B37.0 ORAL CANDIDIASIS: ICD-10-CM

## 2023-01-19 PROCEDURE — 3074F SYST BP LT 130 MM HG: CPT | Performed by: INTERNAL MEDICINE

## 2023-01-19 PROCEDURE — 99213 OFFICE O/P EST LOW 20 MIN: CPT | Performed by: INTERNAL MEDICINE

## 2023-01-19 PROCEDURE — 3078F DIAST BP <80 MM HG: CPT | Performed by: INTERNAL MEDICINE

## 2023-01-19 RX ORDER — HYDROCODONE BITARTRATE AND ACETAMINOPHEN 7.5; 325 MG/1; MG/1
1 TABLET ORAL EVERY 8 HOURS PRN
Qty: 90 TABLET | Refills: 0 | Status: SHIPPED | OUTPATIENT
Start: 2023-01-19 | End: 2023-02-18

## 2023-01-19 RX ORDER — TRAZODONE HYDROCHLORIDE 50 MG/1
TABLET ORAL
Qty: 30 TABLET | Refills: 2 | OUTPATIENT
Start: 2023-01-19

## 2023-01-19 RX ORDER — FLUTICASONE PROPIONATE 50 MCG
1 SPRAY, SUSPENSION (ML) NASAL DAILY
Qty: 16 G | Refills: 0 | Status: SHIPPED | OUTPATIENT
Start: 2023-01-19

## 2023-01-19 RX ORDER — CYCLOBENZAPRINE HCL 10 MG
10 TABLET ORAL 3 TIMES DAILY PRN
Qty: 90 TABLET | Refills: 0 | Status: SHIPPED | OUTPATIENT
Start: 2023-01-19 | End: 2023-01-29

## 2023-01-19 ASSESSMENT — PATIENT HEALTH QUESTIONNAIRE - PHQ9
SUM OF ALL RESPONSES TO PHQ QUESTIONS 1-9: 0
5. POOR APPETITE OR OVEREATING: 0
8. MOVING OR SPEAKING SO SLOWLY THAT OTHER PEOPLE COULD HAVE NOTICED. OR THE OPPOSITE, BEING SO FIGETY OR RESTLESS THAT YOU HAVE BEEN MOVING AROUND A LOT MORE THAN USUAL: 0
10. IF YOU CHECKED OFF ANY PROBLEMS, HOW DIFFICULT HAVE THESE PROBLEMS MADE IT FOR YOU TO DO YOUR WORK, TAKE CARE OF THINGS AT HOME, OR GET ALONG WITH OTHER PEOPLE: 0
6. FEELING BAD ABOUT YOURSELF - OR THAT YOU ARE A FAILURE OR HAVE LET YOURSELF OR YOUR FAMILY DOWN: 0
7. TROUBLE CONCENTRATING ON THINGS, SUCH AS READING THE NEWSPAPER OR WATCHING TELEVISION: 0
SUM OF ALL RESPONSES TO PHQ9 QUESTIONS 1 & 2: 0
SUM OF ALL RESPONSES TO PHQ QUESTIONS 1-9: 0
3. TROUBLE FALLING OR STAYING ASLEEP: 0
9. THOUGHTS THAT YOU WOULD BE BETTER OFF DEAD, OR OF HURTING YOURSELF: 0
SUM OF ALL RESPONSES TO PHQ QUESTIONS 1-9: 0
2. FEELING DOWN, DEPRESSED OR HOPELESS: 0
4. FEELING TIRED OR HAVING LITTLE ENERGY: 0
1. LITTLE INTEREST OR PLEASURE IN DOING THINGS: 0
SUM OF ALL RESPONSES TO PHQ QUESTIONS 1-9: 0

## 2023-01-19 NOTE — PROGRESS NOTES
SUBJECTIVE:  Cong Wadsworth is a 65 y.o. female being evaluated for:    Chief Complaint   Patient presents with    Chronic Pain     3 mo med check for refills on pain meds       HPI   Back is about the same  no sciatica in a while  Meds are working for her   Mri of back with L3 compression fracture ? Disc disease worse at L4/5 and [id number] is worse than her back  Feels like shoulders are up to her ears using a heating pad   MRI of neck with moderate right neural formainal narroing at C3/4 and mild at C5-6    Patient with wet cough and thick production spitting into mouth  Mouth feeling slimy and sore  No wheezing sob  No head congestion  no ear pair    NO fevers or chills Going on for a month Tongue burns     Allergies   Allergen Reactions    Avelox [Moxifloxacin] Swelling and Rash    Sulfa Antibiotics Swelling and Rash    Chantix [Varenicline] Other (See Comments)     irritability and worsening depression     Codeine Nausea Only     Current Outpatient Medications   Medication Sig Dispense Refill    HYDROcodone-acetaminophen (NORCO) 7.5-325 MG per tablet Take 1 tablet by mouth every 8 hours as needed for Pain for up to 30 days.  90 tablet 0    cyclobenzaprine (FLEXERIL) 10 MG tablet Take 1 tablet by mouth 3 times daily as needed for Muscle spasms 90 tablet 0    fluticasone (FLONASE) 50 MCG/ACT nasal spray 1 spray by Each Nostril route daily 16 g 0    nystatin (MYCOSTATIN) 821889 UNIT/ML suspension Take 5 mLs by mouth in the morning, at noon, and at bedtime 60 mL 1    escitalopram (LEXAPRO) 20 MG tablet TAKE 1 TABLET BY MOUTH EVERY DAY 90 tablet 1    traZODone (DESYREL) 50 MG tablet TAKE 1 TO 2 TABLETS BY MOUTH AT BEDTIME AS NEEDED FOR SLEEP 30 tablet 2    fluticasone-umeclidin-vilant (TRELEGY ELLIPTA) 100-62.5-25 MCG/INH AEPB Inhale 1 puff into the lungs daily 3 each 1    albuterol sulfate HFA (PROVENTIL HFA) 108 (90 Base) MCG/ACT inhaler Inhale 2 puffs into the lungs every 6 hours as needed for Wheezing or Shortness of Breath 1 each 3     No current facility-administered medications for this visit. Social History     Socioeconomic History    Marital status:      Spouse name: Not on file    Number of children: Not on file    Years of education: Not on file    Highest education level: Not on file   Occupational History    Not on file   Tobacco Use    Smoking status: Every Day     Packs/day: 0.75     Years: 34.00     Pack years: 25.50     Types: Cigarettes     Start date: 1/1/1985    Smokeless tobacco: Never   Vaping Use    Vaping Use: Never used   Substance and Sexual Activity    Alcohol use: No    Drug use: Never    Sexual activity: Yes     Partners: Male   Other Topics Concern    Not on file   Social History Narrative    Not on file     Social Determinants of Health     Financial Resource Strain: Low Risk     Difficulty of Paying Living Expenses: Not hard at all   Food Insecurity: No Food Insecurity    Worried About Running Out of Food in the Last Year: Never true    Ran Out of Food in the Last Year: Never true   Transportation Needs: Not on file   Physical Activity: Not on file   Stress: Not on file   Social Connections: Not on file   Intimate Partner Violence: Not on file   Housing Stability: Not on file      Past Medical History:   Diagnosis Date    Anxiety     Chronic back pain     Chronic pain     Depression     GERD (gastroesophageal reflux disease)     Hyperlipidemia     Lumbar disc disease     Melanoma ([facility]     Migraine     Osteoarthritis     Sciatica of right side     Tobacco abuse     Unspecified essential hypertension      Past Surgical History:   Procedure Laterality Date    APPENDECTOMY      KNEE SURGERY Left     SHOULDER SURGERY Left     SKIN CANCER DESTRUCTION  02/2018    melanoma removed from right leg       Review of Systems   Constitutional:  Negative for chills and fever. HENT:  Positive for mouth sores and postnasal drip. Negative for congestion and ear pain.     Respiratory: Positive for cough and wheezing. Negative for shortness of breath. Cardiovascular:  Negative for chest pain and palpitations. Gastrointestinal:  Negative for abdominal pain, blood in stool, diarrhea, nausea and vomiting. Genitourinary:  Negative for difficulty urinating and dysuria. Musculoskeletal:  Positive for back pain and neck pain. Neurological:  Negative for dizziness, light-headedness and headaches. No sciatica     OBJECTIVE:  /72 (Site: Left Upper Arm, Position: Sitting, Cuff Size: Medium Adult)   Pulse 86   Temp 97.8 °F (36.6 °C) (Oral)   Resp 16   Wt 114 lb (51.7 kg)   LMP  (LMP Unknown)   SpO2 97%   BMI 21.54 kg/m²      Body mass index is 21.54 kg/m². Physical Exam  Constitutional:       General: She is not in acute distress. Appearance: Normal appearance. She is well-developed. HENT:      Head: Normocephalic and atraumatic. Right Ear: Tympanic membrane and ear canal normal.      Left Ear: Tympanic membrane and ear canal normal.      Nose: Nose normal.      Mouth/Throat:      Comments: Mouth and tongue whit wet white plaques   Eyes:      Conjunctiva/sclera: Conjunctivae normal.   Neck:      Thyroid: No thyromegaly. Vascular: No JVD. Cardiovascular:      Rate and Rhythm: Normal rate and regular rhythm. Heart sounds: Normal heart sounds. No murmur heard. No friction rub. No gallop. Pulmonary:      Effort: Pulmonary effort is normal.      Breath sounds: Wheezing (greater in left lower lobe ) present. Chest:      Chest wall: No tenderness. Abdominal:      General: Bowel sounds are normal. There is no distension. Palpations: Abdomen is soft. Tenderness: There is no abdominal tenderness. Comments: No HSM   Musculoskeletal:         General: Tenderness (decreased neck) present. No swelling. Cervical back: Normal range of motion and neck supple. Tenderness (post mid) present. Muscular tenderness present.       Comments: decreased flexion and extension of back    Lymphadenopathy:      Cervical: No cervical adenopathy. Skin:     General: Skin is warm and dry. Findings: No rash. Neurological:      General: No focal deficit present. Mental Status: She is alert. Gait: Gait normal.       ASSESSMENT/PLAN:    Hannah Rodriguez was seen today for chronic pain. Diagnoses and all orders for this visit:    Cervical disc disease  -     External Referral To Pain Clinic  -     cyclobenzaprine (FLEXERIL) 10 MG tablet; Take 1 tablet by mouth 3 times daily as needed for Muscle spasms    Lumbar disc disease with radiculopathy  -     External Referral To Pain Clinic  -     cyclobenzaprine (FLEXERIL) 10 MG tablet; Take 1 tablet by mouth 3 times daily as needed for Muscle spasms    Chronic pain syndrome reviewed oarrs   -     External Referral To Pain Clinic  -     HYDROcodone-acetaminophen (Thor Matthews) 7.5-325 MG per tablet; Take 1 tablet by mouth every 8 hours as needed for Pain for up to 30 days. Allergic rhinitis, unspecified seasonality, unspecified trigger  -     fluticasone (FLONASE) 50 MCG/ACT nasal spray; 1 spray by Each Nostril route daily    Oral candidiasis  -     nystatin (MYCOSTATIN) [id number] UNIT/ML suspension; Take 5 mLs by mouth in the morning, at noon, and at bedtime      Orders Placed This Encounter   Medications    HYDROcodone-acetaminophen (NORCO) 7.5-325 MG per tablet     Sig: Take 1 tablet by mouth every 8 hours as needed for Pain for up to 30 days.      Dispense:  90 tablet     Refill:  0     Reduce doses taken as pain becomes manageable    cyclobenzaprine (FLEXERIL) 10 MG tablet     Sig: Take 1 tablet by mouth 3 times daily as needed for Muscle spasms     Dispense:  90 tablet     Refill:  0    fluticasone (FLONASE) 50 MCG/ACT nasal spray     Si spray by Each Nostril route daily     Dispense:  16 g     Refill:  0    nystatin (MYCOSTATIN) [id number] UNIT/ML suspension     Sig: Take 5 mLs by mouth in the morning, at noon, and at bedtime     Dispense:  60 mL     Refill:  1        Return if symptoms worsen or fail to improve, for pain . There are no Patient Instructions on file for this visit.

## 2023-01-21 ASSESSMENT — ENCOUNTER SYMPTOMS
WHEEZING: 1
NAUSEA: 0
VOMITING: 0
SHORTNESS OF BREATH: 0
DIARRHEA: 0
COUGH: 1
BLOOD IN STOOL: 0
ABDOMINAL PAIN: 0
BACK PAIN: 1

## 2023-02-09 RX ORDER — TRAZODONE HYDROCHLORIDE 50 MG/1
TABLET ORAL
Qty: 30 TABLET | Refills: 2 | Status: SHIPPED | OUTPATIENT
Start: 2023-02-09

## 2023-02-10 DIAGNOSIS — J30.9 ALLERGIC RHINITIS, UNSPECIFIED SEASONALITY, UNSPECIFIED TRIGGER: ICD-10-CM

## 2023-02-10 RX ORDER — FLUTICASONE PROPIONATE 50 MCG
SPRAY, SUSPENSION (ML) NASAL
Qty: 16 G | Refills: 2 | Status: SHIPPED | OUTPATIENT
Start: 2023-02-10

## 2023-02-17 RX ORDER — TRAZODONE HYDROCHLORIDE 50 MG/1
TABLET ORAL
Qty: 30 TABLET | Refills: 2 | OUTPATIENT
Start: 2023-02-17

## 2023-02-20 ENCOUNTER — TELEPHONE (OUTPATIENT)
Dept: FAMILY MEDICINE CLINIC | Age: 63
End: 2023-02-20

## 2023-02-20 DIAGNOSIS — G89.4 CHRONIC PAIN SYNDROME: ICD-10-CM

## 2023-02-20 RX ORDER — FLUCONAZOLE 100 MG/1
100 TABLET ORAL DAILY
Qty: 7 TABLET | Refills: 0 | Status: SHIPPED | OUTPATIENT
Start: 2023-02-20 | End: 2023-02-27

## 2023-02-20 RX ORDER — HYDROCODONE BITARTRATE AND ACETAMINOPHEN 7.5; 325 MG/1; MG/1
1 TABLET ORAL EVERY 8 HOURS PRN
Qty: 90 TABLET | Refills: 0 | Status: SHIPPED | OUTPATIENT
Start: 2023-02-20 | End: 2023-03-22

## 2023-03-02 DIAGNOSIS — M50.90 CERVICAL DISC DISEASE: ICD-10-CM

## 2023-03-02 DIAGNOSIS — M51.16 LUMBAR DISC DISEASE WITH RADICULOPATHY: ICD-10-CM

## 2023-03-02 RX ORDER — CYCLOBENZAPRINE HCL 10 MG
TABLET ORAL
Qty: 90 TABLET | Refills: 0 | Status: SHIPPED | OUTPATIENT
Start: 2023-03-02

## 2023-03-07 RX ORDER — TRAZODONE HYDROCHLORIDE 50 MG/1
TABLET ORAL
Qty: 30 TABLET | Refills: 0 | Status: SHIPPED | OUTPATIENT
Start: 2023-03-07

## 2023-03-09 RX ORDER — TRAZODONE HYDROCHLORIDE 50 MG/1
TABLET ORAL
Qty: 30 TABLET | Refills: 2 | OUTPATIENT
Start: 2023-03-09

## 2023-03-10 RX ORDER — TRAZODONE HYDROCHLORIDE 50 MG/1
TABLET ORAL
Qty: 180 TABLET | Refills: 1 | OUTPATIENT
Start: 2023-03-10

## 2023-03-14 ENCOUNTER — TELEPHONE (OUTPATIENT)
Dept: FAMILY MEDICINE CLINIC | Age: 63
End: 2023-03-14

## 2023-03-14 RX ORDER — TRAZODONE HYDROCHLORIDE 50 MG/1
TABLET ORAL
Qty: 90 TABLET | Refills: 0 | Status: SHIPPED | OUTPATIENT
Start: 2023-03-14

## 2023-03-14 NOTE — TELEPHONE ENCOUNTER
[facility] send a message on trazodone 50   mg . Pharmacy comment : patient insurance wants 90 days supply .

## 2023-03-20 DIAGNOSIS — G89.4 CHRONIC PAIN SYNDROME: ICD-10-CM

## 2023-03-20 RX ORDER — HYDROCODONE BITARTRATE AND ACETAMINOPHEN 7.5; 325 MG/1; MG/1
1 TABLET ORAL EVERY 8 HOURS PRN
Qty: 90 TABLET | Refills: 0 | Status: SHIPPED | OUTPATIENT
Start: 2023-03-20 | End: 2023-04-19

## 2023-03-27 RX ORDER — TRAZODONE HYDROCHLORIDE 50 MG/1
TABLET ORAL
Qty: 90 TABLET | Refills: 0 | OUTPATIENT
Start: 2023-03-27

## 2023-04-19 DIAGNOSIS — G89.4 CHRONIC PAIN SYNDROME: ICD-10-CM

## 2023-04-19 RX ORDER — TRAZODONE HYDROCHLORIDE 50 MG/1
TABLET ORAL
Qty: 90 TABLET | Refills: 0 | Status: SHIPPED | OUTPATIENT
Start: 2023-04-19

## 2023-04-19 RX ORDER — HYDROCODONE BITARTRATE AND ACETAMINOPHEN 7.5; 325 MG/1; MG/1
1 TABLET ORAL EVERY 8 HOURS PRN
Qty: 90 TABLET | Refills: 0 | Status: SHIPPED | OUTPATIENT
Start: 2023-04-19 | End: 2023-05-19

## 2023-04-24 ENCOUNTER — OFFICE VISIT (OUTPATIENT)
Dept: FAMILY MEDICINE CLINIC | Age: 63
End: 2023-04-24
Payer: COMMERCIAL

## 2023-04-24 VITALS
OXYGEN SATURATION: 95 % | WEIGHT: 116 LBS | BODY MASS INDEX: 21.9 KG/M2 | TEMPERATURE: 98.3 F | DIASTOLIC BLOOD PRESSURE: 70 MMHG | HEIGHT: 61 IN | SYSTOLIC BLOOD PRESSURE: 100 MMHG | HEART RATE: 80 BPM

## 2023-04-24 DIAGNOSIS — F33.40 RECURRENT MAJOR DEPRESSIVE DISORDER, IN REMISSION (HCC): ICD-10-CM

## 2023-04-24 DIAGNOSIS — Z11.59 NEED FOR HEPATITIS C SCREENING TEST: ICD-10-CM

## 2023-04-24 DIAGNOSIS — I77.1 SUBCLAVIAN ARTERY STENOSIS (HCC): ICD-10-CM

## 2023-04-24 DIAGNOSIS — C43.71 MALIGNANT MELANOMA OF RIGHT LOWER EXTREMITY INCLUDING HIP (HCC): ICD-10-CM

## 2023-04-24 DIAGNOSIS — E78.9 BORDERLINE HIGH CHOLESTEROL: ICD-10-CM

## 2023-04-24 DIAGNOSIS — M54.31 SCIATICA OF RIGHT SIDE: Primary | ICD-10-CM

## 2023-04-24 DIAGNOSIS — I10 ESSENTIAL HYPERTENSION: ICD-10-CM

## 2023-04-24 DIAGNOSIS — I65.23 BILATERAL CAROTID ARTERY STENOSIS: ICD-10-CM

## 2023-04-24 DIAGNOSIS — F11.20 OPIOID DEPENDENCE WITH CURRENT USE (HCC): ICD-10-CM

## 2023-04-24 DIAGNOSIS — J44.9 CHRONIC OBSTRUCTIVE PULMONARY DISEASE, UNSPECIFIED COPD TYPE (HCC): ICD-10-CM

## 2023-04-24 PROCEDURE — 99214 OFFICE O/P EST MOD 30 MIN: CPT | Performed by: INTERNAL MEDICINE

## 2023-04-24 PROCEDURE — 3078F DIAST BP <80 MM HG: CPT | Performed by: INTERNAL MEDICINE

## 2023-04-24 PROCEDURE — 3074F SYST BP LT 130 MM HG: CPT | Performed by: INTERNAL MEDICINE

## 2023-04-24 RX ORDER — FLUTICASONE FUROATE, UMECLIDINIUM BROMIDE AND VILANTEROL TRIFENATATE 100; 62.5; 25 UG/1; UG/1; UG/1
1 POWDER RESPIRATORY (INHALATION) DAILY
Qty: 3 EACH | Refills: 3 | Status: SHIPPED | OUTPATIENT
Start: 2023-04-24

## 2023-04-24 RX ORDER — ESCITALOPRAM OXALATE 20 MG/1
TABLET ORAL
Qty: 90 TABLET | Refills: 1 | Status: SHIPPED | OUTPATIENT
Start: 2023-04-24

## 2023-04-24 RX ORDER — ASPIRIN 81 MG/1
81 TABLET ORAL DAILY
Qty: 30 TABLET | Refills: 5 | Status: SHIPPED | OUTPATIENT
Start: 2023-04-24

## 2023-04-24 SDOH — ECONOMIC STABILITY: FOOD INSECURITY: WITHIN THE PAST 12 MONTHS, YOU WORRIED THAT YOUR FOOD WOULD RUN OUT BEFORE YOU GOT MONEY TO BUY MORE.: NEVER TRUE

## 2023-04-24 SDOH — ECONOMIC STABILITY: INCOME INSECURITY: HOW HARD IS IT FOR YOU TO PAY FOR THE VERY BASICS LIKE FOOD, HOUSING, MEDICAL CARE, AND HEATING?: NOT HARD AT ALL

## 2023-04-24 SDOH — ECONOMIC STABILITY: FOOD INSECURITY: WITHIN THE PAST 12 MONTHS, THE FOOD YOU BOUGHT JUST DIDN'T LAST AND YOU DIDN'T HAVE MONEY TO GET MORE.: NEVER TRUE

## 2023-04-24 SDOH — ECONOMIC STABILITY: HOUSING INSECURITY
IN THE LAST 12 MONTHS, WAS THERE A TIME WHEN YOU DID NOT HAVE A STEADY PLACE TO SLEEP OR SLEPT IN A SHELTER (INCLUDING NOW)?: NO

## 2023-04-24 NOTE — PROGRESS NOTES
SUBJECTIVE:  José Pritchett is a 65 y.o. female being evaluated for:    Chief Complaint   Patient presents with    3 Month Follow-Up      Patient is here for 3 months followup on the pain medication . Medication Refill       HPI   Chronic back pain   pain down her right leg  NO trouble urinating  Worse as has been out of hydrocodone apparently another manufacture shortage   Just notified meds are in pharmacy    Working at [employer]  Does not do a lot of lifting there    Breathing has been fine needing 3 months of trilegy   Allergies   Allergen Reactions    Avelox [Moxifloxacin] Swelling and Rash    Sulfa Antibiotics Swelling and Rash    Chantix [Varenicline] Other (See Comments)     irritability and worsening depression     Codeine Nausea Only     Current Outpatient Medications   Medication Sig Dispense Refill    escitalopram (LEXAPRO) 20 MG tablet TAKE 1 TABLET BY MOUTH EVERY DAY 90 tablet 1    aspirin EC 81 MG EC tablet Take 1 tablet by mouth daily 30 tablet 5    fluticasone-umeclidin-vilant (TRELEGY ELLIPTA) 100-62.5-25 MCG/ACT AEPB inhaler Inhale 1 puff into the lungs daily 3 each 3    HYDROcodone-acetaminophen (NORCO) 7.5-325 MG per tablet Take 1 tablet by mouth every 8 hours as needed for Pain for up to 30 days.  90 tablet 0    traZODone (DESYREL) 50 MG tablet TAKE 1 TO 2 TABLETS BY MOUTH AT BEDTIME AS NEEDED FOR SLEEP 90 tablet 0    cyclobenzaprine (FLEXERIL) 10 MG tablet TAKE 1 TABLET BY MOUTH THREE TIMES A DAY AS NEEDED FOR MUSCLE SPASMS 90 tablet 0    fluticasone (FLONASE) 50 MCG/ACT nasal spray SPRAY 1 SPRAY INTO EACH NOSTRIL EVERY DAY 16 g 2    albuterol sulfate HFA (PROVENTIL HFA) 108 (90 Base) MCG/ACT inhaler Inhale 2 puffs into the lungs every 6 hours as needed for Wheezing or Shortness of Breath 1 each 3    nystatin (MYCOSTATIN) 787076 UNIT/ML suspension Take 5 mLs by mouth in the morning, at noon, and at bedtime (Patient not taking: Reported on 4/24/2023) 60 mL 1     No current

## 2023-04-26 ASSESSMENT — ENCOUNTER SYMPTOMS
NAUSEA: 0
DIARRHEA: 0
BACK PAIN: 1
SHORTNESS OF BREATH: 0
ABDOMINAL PAIN: 0
BLOOD IN STOOL: 0
COUGH: 0
VOMITING: 0

## 2023-05-17 RX ORDER — SALICYLIC ACID 40 %
ADHESIVE PATCH, MEDICATED TOPICAL
Qty: 30 TABLET | Refills: 5 | Status: SHIPPED | OUTPATIENT
Start: 2023-05-17

## 2023-05-22 ENCOUNTER — TELEPHONE (OUTPATIENT)
Dept: FAMILY MEDICINE CLINIC | Age: 63
End: 2023-05-22

## 2023-05-22 DIAGNOSIS — G89.4 CHRONIC PAIN SYNDROME: ICD-10-CM

## 2023-05-22 RX ORDER — HYDROCODONE BITARTRATE AND ACETAMINOPHEN 7.5; 325 MG/1; MG/1
1 TABLET ORAL EVERY 8 HOURS PRN
Qty: 90 TABLET | Refills: 0 | Status: SHIPPED | OUTPATIENT
Start: 2023-05-22 | End: 2023-06-21

## 2023-05-24 ENCOUNTER — TELEPHONE (OUTPATIENT)
Dept: FAMILY MEDICINE CLINIC | Age: 63
End: 2023-05-24

## 2023-05-24 NOTE — TELEPHONE ENCOUNTER
Spoke with the pt she wants to hold off on trying to get it at another pharmacy, the last time [facility] ended up getting the medication a week later. She might stop at [facility] ([place], [place]) to see if they have it and let us know.

## 2023-06-05 DIAGNOSIS — M51.16 LUMBAR DISC DISEASE WITH RADICULOPATHY: ICD-10-CM

## 2023-06-05 DIAGNOSIS — M50.90 CERVICAL DISC DISEASE: ICD-10-CM

## 2023-06-05 DIAGNOSIS — G89.4 CHRONIC PAIN SYNDROME: ICD-10-CM

## 2023-06-05 RX ORDER — HYDROCODONE BITARTRATE AND ACETAMINOPHEN 7.5; 325 MG/1; MG/1
1 TABLET ORAL EVERY 8 HOURS PRN
Qty: 90 TABLET | Refills: 0 | Status: SHIPPED | OUTPATIENT
Start: 2023-06-05 | End: 2023-06-06 | Stop reason: SDUPTHER

## 2023-06-05 RX ORDER — CYCLOBENZAPRINE HCL 10 MG
10 TABLET ORAL 3 TIMES DAILY PRN
Qty: 90 TABLET | Refills: 0 | Status: SHIPPED | OUTPATIENT
Start: 2023-06-05

## 2023-06-06 DIAGNOSIS — G89.4 CHRONIC PAIN SYNDROME: ICD-10-CM

## 2023-06-06 RX ORDER — HYDROCODONE BITARTRATE AND ACETAMINOPHEN 7.5; 325 MG/1; MG/1
1 TABLET ORAL EVERY 8 HOURS PRN
Qty: 90 TABLET | Refills: 0 | Status: SHIPPED | OUTPATIENT
Start: 2023-06-06 | End: 2023-07-06

## 2023-06-06 NOTE — TELEPHONE ENCOUNTER
Called [facility] and cx'd rx for HYDROcodone-acetaminophen (NORCO) 7.5-325 MG per tablet    Please send to [facility] in [place]

## 2023-06-06 NOTE — TELEPHONE ENCOUNTER
----- Message from Carline Reece sent at 6/6/2023  2:05 PM EDT -----  Regarding: Hydrocodone   Contact: [phone]  That last prescription was supposed to go to [facility] in [address] [facility]

## 2023-06-20 DIAGNOSIS — G89.4 CHRONIC PAIN SYNDROME: ICD-10-CM

## 2023-06-20 RX ORDER — HYDROCODONE BITARTRATE AND ACETAMINOPHEN 7.5; 325 MG/1; MG/1
1 TABLET ORAL EVERY 8 HOURS PRN
Qty: 90 TABLET | Refills: 0 | Status: SHIPPED | OUTPATIENT
Start: 2023-06-20 | End: 2023-07-20

## 2023-06-27 DIAGNOSIS — J44.9 CHRONIC OBSTRUCTIVE PULMONARY DISEASE, UNSPECIFIED COPD TYPE (HCC): ICD-10-CM

## 2023-06-28 RX ORDER — ALBUTEROL SULFATE 90 UG/1
2 AEROSOL, METERED RESPIRATORY (INHALATION) EVERY 6 HOURS PRN
Qty: 1 EACH | Refills: 3 | Status: SHIPPED | OUTPATIENT
Start: 2023-06-28

## 2023-06-28 RX ORDER — ESCITALOPRAM OXALATE 20 MG/1
20 TABLET ORAL DAILY
Qty: 90 TABLET | Refills: 1 | Status: SHIPPED | OUTPATIENT
Start: 2023-06-28

## 2023-07-11 ENCOUNTER — OFFICE VISIT (OUTPATIENT)
Dept: FAMILY MEDICINE CLINIC | Age: 63
End: 2023-07-11
Payer: COMMERCIAL

## 2023-07-11 VITALS
HEIGHT: 61 IN | TEMPERATURE: 97.2 F | WEIGHT: 113 LBS | SYSTOLIC BLOOD PRESSURE: 124 MMHG | OXYGEN SATURATION: 97 % | HEART RATE: 86 BPM | BODY MASS INDEX: 21.34 KG/M2 | DIASTOLIC BLOOD PRESSURE: 80 MMHG

## 2023-07-11 DIAGNOSIS — J44.1 CHRONIC OBSTRUCTIVE PULMONARY DISEASE WITH ACUTE EXACERBATION (HCC): Primary | ICD-10-CM

## 2023-07-11 PROCEDURE — 99213 OFFICE O/P EST LOW 20 MIN: CPT | Performed by: INTERNAL MEDICINE

## 2023-07-11 PROCEDURE — 3074F SYST BP LT 130 MM HG: CPT | Performed by: INTERNAL MEDICINE

## 2023-07-11 PROCEDURE — 3078F DIAST BP <80 MM HG: CPT | Performed by: INTERNAL MEDICINE

## 2023-07-11 RX ORDER — PREDNISONE 20 MG/1
TABLET ORAL
Qty: 11 TABLET | Refills: 0 | Status: SHIPPED | OUTPATIENT
Start: 2023-07-11

## 2023-07-11 RX ORDER — DOXYCYCLINE HYCLATE 100 MG
100 TABLET ORAL 2 TIMES DAILY
Qty: 20 TABLET | Refills: 0 | Status: SHIPPED | OUTPATIENT
Start: 2023-07-11 | End: 2023-07-21

## 2023-07-11 NOTE — PROGRESS NOTES
SUBJECTIVE:  Bg Luna is a 65 y.o. female being evaluated for:    Chief Complaint   Patient presents with    Cough     Hurts take a deep breath       HPI   Coughing since last Friday  Not productive  She is wheezing and sob  NO head congestion or ear pain  all in her lungs   No fevers or chills   Still smoking 1/2 pack a day  Cutting back   CT lung screen last fall with no suspicious nodules   Back same old with chronic pain   Allergies   Allergen Reactions    Avelox [Moxifloxacin] Swelling and Rash    Sulfa Antibiotics Swelling and Rash    Chantix [Varenicline] Other (See Comments)     irritability and worsening depression     Codeine Nausea Only     Current Outpatient Medications   Medication Sig Dispense Refill    doxycycline hyclate (VIBRA-TABS) 100 MG tablet Take 1 tablet by mouth 2 times daily for 10 days 20 tablet 0    predniSONE (DELTASONE) 20 MG tablet 2 pills daily for 3 days, 1 pill daily for 3 days and 1/2 daily for 3 days 11 tablet 0    albuterol sulfate HFA (PROVENTIL HFA) 108 (90 Base) MCG/ACT inhaler Inhale 2 puffs into the lungs every 6 hours as needed for Wheezing or Shortness of Breath 1 each 3    escitalopram (LEXAPRO) 20 MG tablet Take 1 tablet by mouth daily 90 tablet 1    HYDROcodone-acetaminophen (NORCO) 7.5-325 MG per tablet Take 1 tablet by mouth every 8 hours as needed for Pain for up to 30 days.  90 tablet 0    cyclobenzaprine (FLEXERIL) 10 MG tablet Take 1 tablet by mouth 3 times daily as needed for Muscle spasms 90 tablet 0    [facility] ASPIRIN LOW DOSE 81 MG EC tablet TAKE 1 TABLET BY MOUTH EVERY DAY 30 tablet 5    fluticasone-umeclidin-vilant (TRELEGY ELLIPTA) 100-62.5-25 MCG/ACT AEPB inhaler Inhale 1 puff into the lungs daily 3 each 3    traZODone (DESYREL) 50 MG tablet TAKE 1 TO 2 TABLETS BY MOUTH AT BEDTIME AS NEEDED FOR SLEEP 90 tablet 0    fluticasone (FLONASE) 50 MCG/ACT nasal spray SPRAY 1 SPRAY INTO EACH NOSTRIL EVERY DAY 16 g 2     No current facility-administered

## 2023-07-14 ASSESSMENT — ENCOUNTER SYMPTOMS
COUGH: 1
RHINORRHEA: 0
VOMITING: 0
WHEEZING: 1
DIARRHEA: 0
BACK PAIN: 1
ABDOMINAL PAIN: 0
CONSTIPATION: 0
NAUSEA: 0
SHORTNESS OF BREATH: 1

## 2023-07-18 DIAGNOSIS — G89.4 CHRONIC PAIN SYNDROME: ICD-10-CM

## 2023-07-18 RX ORDER — HYDROCODONE BITARTRATE AND ACETAMINOPHEN 7.5; 325 MG/1; MG/1
1 TABLET ORAL EVERY 8 HOURS PRN
Qty: 90 TABLET | Refills: 0 | Status: SHIPPED | OUTPATIENT
Start: 2023-07-19 | End: 2023-08-18

## 2023-07-20 RX ORDER — TRAZODONE HYDROCHLORIDE 50 MG/1
TABLET ORAL
Qty: 90 TABLET | Refills: 0 | Status: SHIPPED | OUTPATIENT
Start: 2023-07-20

## 2023-07-24 ENCOUNTER — OFFICE VISIT (OUTPATIENT)
Dept: FAMILY MEDICINE CLINIC | Age: 63
End: 2023-07-24
Payer: COMMERCIAL

## 2023-07-24 VITALS
WEIGHT: 112 LBS | SYSTOLIC BLOOD PRESSURE: 124 MMHG | BODY MASS INDEX: 21.14 KG/M2 | HEIGHT: 61 IN | TEMPERATURE: 96.4 F | DIASTOLIC BLOOD PRESSURE: 78 MMHG

## 2023-07-24 DIAGNOSIS — R21 RASH DUE TO ALLERGY: Primary | ICD-10-CM

## 2023-07-24 DIAGNOSIS — G89.4 CHRONIC PAIN SYNDROME: ICD-10-CM

## 2023-07-24 DIAGNOSIS — Z85.820 HX OF MALIGNANT MELANOMA: ICD-10-CM

## 2023-07-24 DIAGNOSIS — J44.1 CHRONIC OBSTRUCTIVE PULMONARY DISEASE WITH ACUTE EXACERBATION (HCC): ICD-10-CM

## 2023-07-24 DIAGNOSIS — M51.16 LUMBAR DISC DISEASE WITH RADICULOPATHY: ICD-10-CM

## 2023-07-24 DIAGNOSIS — L98.9 SKIN LESION OF HAND: ICD-10-CM

## 2023-07-24 DIAGNOSIS — T78.40XA RASH DUE TO ALLERGY: Primary | ICD-10-CM

## 2023-07-24 DIAGNOSIS — I65.22 LEFT CAROTID STENOSIS: ICD-10-CM

## 2023-07-24 PROCEDURE — 99214 OFFICE O/P EST MOD 30 MIN: CPT | Performed by: INTERNAL MEDICINE

## 2023-07-24 PROCEDURE — 3078F DIAST BP <80 MM HG: CPT | Performed by: INTERNAL MEDICINE

## 2023-07-24 PROCEDURE — 3074F SYST BP LT 130 MM HG: CPT | Performed by: INTERNAL MEDICINE

## 2023-07-24 RX ORDER — PREDNISONE 20 MG/1
20 TABLET ORAL DAILY
Qty: 10 TABLET | Refills: 0 | Status: SHIPPED | OUTPATIENT
Start: 2023-07-24 | End: 2023-08-03

## 2023-07-24 RX ORDER — FLUTICASONE FUROATE, UMECLIDINIUM BROMIDE AND VILANTEROL TRIFENATATE 100; 62.5; 25 UG/1; UG/1; UG/1
1 POWDER RESPIRATORY (INHALATION) DAILY
Qty: 3 EACH | Refills: 3 | Status: SHIPPED | OUTPATIENT
Start: 2023-07-24

## 2023-07-24 NOTE — PROGRESS NOTES
SUBJECTIVE:  Bg Luna is a 65 y.o. female being evaluated for:    Chief Complaint   Patient presents with    3 Month Follow-Up     3 month check-up        HPI   Bronchitis is better  Still coughing up stuff but breathing is better  Now with rash Up her arms  started after throwing grass seeds and feels fertilizer in the seed     Chronic low back pain down her right leg  No worse     Off bp meds and doing well for last year   Allergies   Allergen Reactions    Avelox [Moxifloxacin] Swelling and Rash    Sulfa Antibiotics Swelling and Rash    Chantix [Varenicline] Other (See Comments)     irritability and worsening depression     Codeine Nausea Only     Current Outpatient Medications   Medication Sig Dispense Refill    fluticasone-umeclidin-vilant (TRELEGY ELLIPTA) 100-62.5-25 MCG/ACT AEPB inhaler Inhale 1 puff into the lungs daily 3 each 3    predniSONE (DELTASONE) 20 MG tablet Take 1 tablet by mouth daily for 10 days 10 tablet 0    traZODone (DESYREL) 50 MG tablet TAKE 1 TO 2 TABLETS BY MOUTH AT BEDTIME AS NEEDED FOR SLEEP 90 tablet 0    HYDROcodone-acetaminophen (NORCO) 7.5-325 MG per tablet Take 1 tablet by mouth every 8 hours as needed for Pain for up to 30 days. 90 tablet 0    albuterol sulfate HFA (PROVENTIL HFA) 108 (90 Base) MCG/ACT inhaler Inhale 2 puffs into the lungs every 6 hours as needed for Wheezing or Shortness of Breath 1 each 3    escitalopram (LEXAPRO) 20 MG tablet Take 1 tablet by mouth daily 90 tablet 1    cyclobenzaprine (FLEXERIL) 10 MG tablet Take 1 tablet by mouth 3 times daily as needed for Muscle spasms 90 tablet 0    [facility] ASPIRIN LOW DOSE 81 MG EC tablet TAKE 1 TABLET BY MOUTH EVERY DAY 30 tablet 5    fluticasone (FLONASE) 50 MCG/ACT nasal spray SPRAY 1 SPRAY INTO EACH NOSTRIL EVERY DAY 16 g 2     No current facility-administered medications for this visit.          Social History     Socioeconomic History    Marital status:      Spouse name: Not on file    Number of children:

## 2023-07-29 ASSESSMENT — ENCOUNTER SYMPTOMS
COUGH: 1
ABDOMINAL PAIN: 0
NAUSEA: 0
SHORTNESS OF BREATH: 0
BLOOD IN STOOL: 0
VOMITING: 0
CONSTIPATION: 1

## 2023-08-21 DIAGNOSIS — G89.4 CHRONIC PAIN SYNDROME: ICD-10-CM

## 2023-08-21 RX ORDER — HYDROCODONE BITARTRATE AND ACETAMINOPHEN 7.5; 325 MG/1; MG/1
1 TABLET ORAL EVERY 8 HOURS PRN
Qty: 90 TABLET | Refills: 0 | Status: SHIPPED | OUTPATIENT
Start: 2023-08-21 | End: 2023-09-20

## 2023-08-21 NOTE — TELEPHONE ENCOUNTER
----- Message from Yosef Alejo sent at 8/21/2023  3:38 PM EDT -----  Regarding: Hydrocodone   Contact: [phone]  Need refill and send to [address] [facility] please Thank You Yes

## 2023-09-04 ENCOUNTER — TELEPHONE (OUTPATIENT)
Dept: CASE MANAGEMENT | Age: 63
End: 2023-09-04

## 2023-09-04 NOTE — TELEPHONE ENCOUNTER
First Lung Cancer Screening Recommendation Reminder Letter mailed to patient. Most recent smoking history reviewed and ALA smoking cessation [address] class information mailed to patient.

## 2023-09-27 ENCOUNTER — PATIENT MESSAGE (OUTPATIENT)
Dept: FAMILY MEDICINE CLINIC | Age: 63
End: 2023-09-27

## 2023-09-27 DIAGNOSIS — G89.4 CHRONIC PAIN SYNDROME: ICD-10-CM

## 2023-09-27 RX ORDER — HYDROCODONE BITARTRATE AND ACETAMINOPHEN 7.5; 325 MG/1; MG/1
1 TABLET ORAL EVERY 8 HOURS PRN
Qty: 90 TABLET | Refills: 0 | Status: SHIPPED | OUTPATIENT
Start: 2023-09-27 | End: 2023-10-27

## 2023-09-27 NOTE — TELEPHONE ENCOUNTER
----- Message from Yuliya Arguelles sent at 9/27/2023  2:16 PM EDT -----  Regarding: Hydrocodone   Contact: [phone]  Can I please get my Hydrocodone refilled to [facility] Thanks

## 2023-10-10 RX ORDER — TRAZODONE HYDROCHLORIDE 50 MG/1
TABLET ORAL
Qty: 90 TABLET | Refills: 0 | Status: SHIPPED | OUTPATIENT
Start: 2023-10-10

## 2023-10-30 ENCOUNTER — OFFICE VISIT (OUTPATIENT)
Dept: FAMILY MEDICINE CLINIC | Age: 63
End: 2023-10-30
Payer: COMMERCIAL

## 2023-10-30 VITALS
WEIGHT: 115 LBS | OXYGEN SATURATION: 97 % | BODY MASS INDEX: 21.73 KG/M2 | SYSTOLIC BLOOD PRESSURE: 136 MMHG | HEART RATE: 86 BPM | TEMPERATURE: 98.2 F | DIASTOLIC BLOOD PRESSURE: 74 MMHG | RESPIRATION RATE: 16 BRPM

## 2023-10-30 DIAGNOSIS — Z85.820 HX OF MALIGNANT MELANOMA: ICD-10-CM

## 2023-10-30 DIAGNOSIS — I10 ESSENTIAL HYPERTENSION: ICD-10-CM

## 2023-10-30 DIAGNOSIS — M51.16 LUMBAR DISC DISEASE WITH RADICULOPATHY: ICD-10-CM

## 2023-10-30 DIAGNOSIS — Z87.891 PERSONAL HISTORY OF TOBACCO USE: ICD-10-CM

## 2023-10-30 DIAGNOSIS — G89.4 CHRONIC PAIN SYNDROME: Primary | ICD-10-CM

## 2023-10-30 DIAGNOSIS — I65.22 LEFT CAROTID STENOSIS: ICD-10-CM

## 2023-10-30 PROCEDURE — G0296 VISIT TO DETERM LDCT ELIG: HCPCS | Performed by: INTERNAL MEDICINE

## 2023-10-30 PROCEDURE — 99214 OFFICE O/P EST MOD 30 MIN: CPT | Performed by: INTERNAL MEDICINE

## 2023-10-30 PROCEDURE — 3078F DIAST BP <80 MM HG: CPT | Performed by: INTERNAL MEDICINE

## 2023-10-30 PROCEDURE — 3074F SYST BP LT 130 MM HG: CPT | Performed by: INTERNAL MEDICINE

## 2023-10-30 RX ORDER — HYDROCODONE BITARTRATE AND ACETAMINOPHEN 7.5; 325 MG/1; MG/1
1 TABLET ORAL EVERY 8 HOURS PRN
Qty: 90 TABLET | Refills: 0 | Status: SHIPPED | OUTPATIENT
Start: 2023-10-30 | End: 2023-11-29

## 2023-10-30 ASSESSMENT — PATIENT HEALTH QUESTIONNAIRE - PHQ9
8. MOVING OR SPEAKING SO SLOWLY THAT OTHER PEOPLE COULD HAVE NOTICED. OR THE OPPOSITE, BEING SO FIGETY OR RESTLESS THAT YOU HAVE BEEN MOVING AROUND A LOT MORE THAN USUAL: 0
4. FEELING TIRED OR HAVING LITTLE ENERGY: 0
9. THOUGHTS THAT YOU WOULD BE BETTER OFF DEAD, OR OF HURTING YOURSELF: 0
10. IF YOU CHECKED OFF ANY PROBLEMS, HOW DIFFICULT HAVE THESE PROBLEMS MADE IT FOR YOU TO DO YOUR WORK, TAKE CARE OF THINGS AT HOME, OR GET ALONG WITH OTHER PEOPLE: 0
SUM OF ALL RESPONSES TO PHQ9 QUESTIONS 1 & 2: 0
6. FEELING BAD ABOUT YOURSELF - OR THAT YOU ARE A FAILURE OR HAVE LET YOURSELF OR YOUR FAMILY DOWN: 0
SUM OF ALL RESPONSES TO PHQ QUESTIONS 1-9: 0
SUM OF ALL RESPONSES TO PHQ QUESTIONS 1-9: 0
2. FEELING DOWN, DEPRESSED OR HOPELESS: 0
1. LITTLE INTEREST OR PLEASURE IN DOING THINGS: 0
SUM OF ALL RESPONSES TO PHQ QUESTIONS 1-9: 0
5. POOR APPETITE OR OVEREATING: 0
SUM OF ALL RESPONSES TO PHQ QUESTIONS 1-9: 0
7. TROUBLE CONCENTRATING ON THINGS, SUCH AS READING THE NEWSPAPER OR WATCHING TELEVISION: 0
3. TROUBLE FALLING OR STAYING ASLEEP: 0

## 2023-10-30 ASSESSMENT — COLUMBIA-SUICIDE SEVERITY RATING SCALE - C-SSRS
3. HAVE YOU BEEN THINKING ABOUT HOW YOU MIGHT KILL YOURSELF?: NO
5. HAVE YOU STARTED TO WORK OUT OR WORKED OUT THE DETAILS OF HOW TO KILL YOURSELF? DO YOU INTEND TO CARRY OUT THIS PLAN?: NO
4. HAVE YOU HAD THESE THOUGHTS AND HAD SOME INTENTION OF ACTING ON THEM?: NO
7. DID THIS OCCUR IN THE LAST THREE MONTHS: NO

## 2023-11-04 ASSESSMENT — ENCOUNTER SYMPTOMS
VOMITING: 0
BACK PAIN: 1
COUGH: 0
BLOOD IN STOOL: 0
SHORTNESS OF BREATH: 0
ABDOMINAL PAIN: 0
DIARRHEA: 0
NAUSEA: 0

## 2023-11-10 ENCOUNTER — TELEPHONE (OUTPATIENT)
Dept: CASE MANAGEMENT | Age: 63
End: 2023-11-10

## 2023-12-03 ENCOUNTER — TELEPHONE (OUTPATIENT)
Dept: CASE MANAGEMENT | Age: 63
End: 2023-12-03

## 2023-12-03 NOTE — TELEPHONE ENCOUNTER
Lung Cancer Screening Radiology Recommendation reminder letter mailed to patient, this is patients 3rd & final reminder letter. Patients ordering provider, Dr. Nikhil Hopkins, notified via EMR direct message, verification received.

## 2023-12-05 DIAGNOSIS — M50.90 CERVICAL DISC DISEASE: ICD-10-CM

## 2023-12-05 DIAGNOSIS — G89.4 CHRONIC PAIN SYNDROME: ICD-10-CM

## 2023-12-05 DIAGNOSIS — M51.16 LUMBAR DISC DISEASE WITH RADICULOPATHY: ICD-10-CM

## 2023-12-05 RX ORDER — HYDROCODONE BITARTRATE AND ACETAMINOPHEN 7.5; 325 MG/1; MG/1
1 TABLET ORAL EVERY 8 HOURS PRN
Qty: 90 TABLET | Refills: 0 | Status: SHIPPED | OUTPATIENT
Start: 2023-12-05 | End: 2024-01-04

## 2023-12-05 NOTE — TELEPHONE ENCOUNTER
----- Message from Katie Buerger sent at 12/4/2023  7:30 PM EST -----  Regarding: Hydrocodone   Contact: [phone]  Please refill my Hydrocodone to [address] [facility]  Thank You

## 2023-12-06 RX ORDER — CYCLOBENZAPRINE HCL 10 MG
10 TABLET ORAL 3 TIMES DAILY PRN
Qty: 90 TABLET | Refills: 0 | Status: SHIPPED | OUTPATIENT
Start: 2023-12-06

## 2024-01-09 DIAGNOSIS — G89.4 CHRONIC PAIN SYNDROME: ICD-10-CM

## 2024-01-09 RX ORDER — HYDROCODONE BITARTRATE AND ACETAMINOPHEN 7.5; 325 MG/1; MG/1
1 TABLET ORAL EVERY 8 HOURS PRN
Qty: 90 TABLET | Refills: 0 | Status: SHIPPED | OUTPATIENT
Start: 2024-01-09 | End: 2024-02-08

## 2024-01-09 NOTE — TELEPHONE ENCOUNTER
----- Message from Teri Villanueva sent at 1/9/2024 10:15 AM EST -----  Regarding: Refill Hydrocodone   Contact: [phone]  Need a refill on Hydrocodone sent to [facility]

## 2024-01-30 ENCOUNTER — OFFICE VISIT (OUTPATIENT)
Dept: FAMILY MEDICINE CLINIC | Age: 64
End: 2024-01-30

## 2024-01-30 VITALS
HEIGHT: 61 IN | HEART RATE: 91 BPM | DIASTOLIC BLOOD PRESSURE: 62 MMHG | BODY MASS INDEX: 21.52 KG/M2 | TEMPERATURE: 97.8 F | OXYGEN SATURATION: 99 % | WEIGHT: 114 LBS | SYSTOLIC BLOOD PRESSURE: 106 MMHG

## 2024-01-30 DIAGNOSIS — Z12.11 COLON CANCER SCREENING: ICD-10-CM

## 2024-01-30 DIAGNOSIS — E78.9 BORDERLINE HIGH CHOLESTEROL: ICD-10-CM

## 2024-01-30 DIAGNOSIS — J44.1 CHRONIC OBSTRUCTIVE PULMONARY DISEASE WITH ACUTE EXACERBATION (HCC): ICD-10-CM

## 2024-01-30 DIAGNOSIS — M54.50 CHRONIC LOW BACK PAIN WITHOUT SCIATICA, UNSPECIFIED BACK PAIN LATERALITY: ICD-10-CM

## 2024-01-30 DIAGNOSIS — G89.29 CHRONIC LOW BACK PAIN WITHOUT SCIATICA, UNSPECIFIED BACK PAIN LATERALITY: ICD-10-CM

## 2024-01-30 DIAGNOSIS — M51.36 DDD (DEGENERATIVE DISC DISEASE), LUMBAR: ICD-10-CM

## 2024-01-30 DIAGNOSIS — F11.20 OPIOID DEPENDENCE WITH CURRENT USE (HCC): Chronic | ICD-10-CM

## 2024-01-30 DIAGNOSIS — Z11.59 ENCOUNTER FOR HEPATITIS C SCREENING TEST FOR LOW RISK PATIENT: ICD-10-CM

## 2024-01-30 DIAGNOSIS — N30.01 ACUTE CYSTITIS WITH HEMATURIA: ICD-10-CM

## 2024-01-30 DIAGNOSIS — F17.200 SMOKER: ICD-10-CM

## 2024-01-30 DIAGNOSIS — R31.29 OTHER MICROSCOPIC HEMATURIA: ICD-10-CM

## 2024-01-30 DIAGNOSIS — K59.03 DRUG-INDUCED CONSTIPATION: Primary | ICD-10-CM

## 2024-01-30 DIAGNOSIS — I10 ESSENTIAL HYPERTENSION: ICD-10-CM

## 2024-01-30 DIAGNOSIS — R10.32 LLQ PAIN: ICD-10-CM

## 2024-01-30 DIAGNOSIS — G47.00 INSOMNIA, UNSPECIFIED TYPE: ICD-10-CM

## 2024-01-30 DIAGNOSIS — G89.4 CHRONIC PAIN SYNDROME: ICD-10-CM

## 2024-01-30 LAB
BILIRUBIN, POC: NEGATIVE
BLOOD URINE, POC: NORMAL
CLARITY, POC: CLEAR
COLOR, POC: YELLOW
GLUCOSE URINE, POC: NEGATIVE
KETONES, POC: NEGATIVE
LEUKOCYTE EST, POC: NORMAL
NITRITE, POC: NEGATIVE
PH, POC: 6.5
PROTEIN, POC: NEGATIVE
SPECIFIC GRAVITY, POC: 1.02
UROBILINOGEN, POC: NORMAL

## 2024-01-30 RX ORDER — HYDROCODONE BITARTRATE AND ACETAMINOPHEN 7.5; 325 MG/1; MG/1
1 TABLET ORAL EVERY 8 HOURS PRN
Qty: 90 TABLET | Refills: 0 | Status: SHIPPED | OUTPATIENT
Start: 2024-01-30 | End: 2024-02-29

## 2024-01-30 RX ORDER — TRAZODONE HYDROCHLORIDE 50 MG/1
TABLET ORAL
Qty: 90 TABLET | Refills: 0 | Status: SHIPPED | OUTPATIENT
Start: 2024-01-30

## 2024-01-30 ASSESSMENT — PATIENT HEALTH QUESTIONNAIRE - PHQ9
5. POOR APPETITE OR OVEREATING: 0
7. TROUBLE CONCENTRATING ON THINGS, SUCH AS READING THE NEWSPAPER OR WATCHING TELEVISION: 0
1. LITTLE INTEREST OR PLEASURE IN DOING THINGS: 0
SUM OF ALL RESPONSES TO PHQ QUESTIONS 1-9: 0
3. TROUBLE FALLING OR STAYING ASLEEP: 0
SUM OF ALL RESPONSES TO PHQ QUESTIONS 1-9: 0
8. MOVING OR SPEAKING SO SLOWLY THAT OTHER PEOPLE COULD HAVE NOTICED. OR THE OPPOSITE, BEING SO FIGETY OR RESTLESS THAT YOU HAVE BEEN MOVING AROUND A LOT MORE THAN USUAL: 0
4. FEELING TIRED OR HAVING LITTLE ENERGY: 0
6. FEELING BAD ABOUT YOURSELF - OR THAT YOU ARE A FAILURE OR HAVE LET YOURSELF OR YOUR FAMILY DOWN: 0
SUM OF ALL RESPONSES TO PHQ QUESTIONS 1-9: 0
SUM OF ALL RESPONSES TO PHQ9 QUESTIONS 1 & 2: 0
9. THOUGHTS THAT YOU WOULD BE BETTER OFF DEAD, OR OF HURTING YOURSELF: 0
2. FEELING DOWN, DEPRESSED OR HOPELESS: 0
SUM OF ALL RESPONSES TO PHQ QUESTIONS 1-9: 0
10. IF YOU CHECKED OFF ANY PROBLEMS, HOW DIFFICULT HAVE THESE PROBLEMS MADE IT FOR YOU TO DO YOUR WORK, TAKE CARE OF THINGS AT HOME, OR GET ALONG WITH OTHER PEOPLE: 0

## 2024-01-30 NOTE — PROGRESS NOTES
Respiratory:  Negative for cough and shortness of breath.    Cardiovascular:  Negative for chest pain, palpitations and leg swelling.   Gastrointestinal:  Positive for abdominal distention, abdominal pain, constipation and nausea. Negative for blood in stool, diarrhea and vomiting.   Genitourinary:  Negative for difficulty urinating and dysuria.   Musculoskeletal:  Positive for back pain. Negative for gait problem.   Neurological:  Negative for dizziness, weakness, light-headedness, numbness and headaches.        Right sciatica        OBJECTIVE:  /62 (Site: Left Upper Arm, Position: Sitting, Cuff Size: Small Adult)   Pulse 91   Temp 97.8 °F (36.6 °C)   Ht 1.549 m (5' 1\")   Wt 51.7 kg (114 lb)   LMP  (LMP Unknown)   SpO2 99%   BMI 21.54 kg/m²      Body mass index is 21.54 kg/m².     Physical Exam  Constitutional:       General: She is not in acute distress.     Appearance: Normal appearance. She is well-developed.   HENT:      Head: Normocephalic and atraumatic.   Eyes:      Conjunctiva/sclera: Conjunctivae normal.   Neck:      Thyroid: No thyromegaly.      Vascular: No JVD.   Cardiovascular:      Rate and Rhythm: Normal rate and regular rhythm.      Heart sounds: Normal heart sounds. No murmur heard.     No gallop.   Pulmonary:      Effort: Pulmonary effort is normal.      Breath sounds: Normal breath sounds.   Abdominal:      General: There is distension.      Palpations: Abdomen is soft. There is no mass.      Tenderness: There is abdominal tenderness (but > in LLQ). There is no guarding or rebound.   Musculoskeletal:         General: Tenderness (low back) present. No swelling.      Cervical back: Neck supple. Muscular tenderness present.   Lymphadenopathy:      Cervical: No cervical adenopathy.   Skin:     General: Skin is warm and dry.   Neurological:      General: No focal deficit present.      Mental Status: She is alert.      Gait: Gait normal.         ASSESSMENT/PLAN:    Teri was seen

## 2024-01-31 LAB — BACTERIA UR CULT: NORMAL

## 2024-02-03 ASSESSMENT — ENCOUNTER SYMPTOMS
COUGH: 0
BLOOD IN STOOL: 0
SHORTNESS OF BREATH: 0
CONSTIPATION: 1
ABDOMINAL DISTENTION: 1
NAUSEA: 1
DIARRHEA: 0
ABDOMINAL PAIN: 1
VOMITING: 0
BACK PAIN: 1

## 2024-02-07 DIAGNOSIS — M50.90 CERVICAL DISC DISEASE: ICD-10-CM

## 2024-02-07 DIAGNOSIS — M51.16 LUMBAR DISC DISEASE WITH RADICULOPATHY: ICD-10-CM

## 2024-02-07 RX ORDER — CYCLOBENZAPRINE HCL 10 MG
TABLET ORAL
Qty: 90 TABLET | Refills: 3 | Status: SHIPPED | OUTPATIENT
Start: 2024-02-07

## 2024-02-20 RX ORDER — ESCITALOPRAM OXALATE 20 MG/1
20 TABLET ORAL DAILY
Qty: 90 TABLET | Refills: 1 | Status: SHIPPED | OUTPATIENT
Start: 2024-02-20

## 2024-02-22 DIAGNOSIS — G47.00 INSOMNIA, UNSPECIFIED TYPE: ICD-10-CM

## 2024-02-22 RX ORDER — TRAZODONE HYDROCHLORIDE 50 MG/1
TABLET ORAL
Qty: 180 TABLET | Refills: 0 | Status: SHIPPED | OUTPATIENT
Start: 2024-02-22

## 2024-03-12 DIAGNOSIS — G89.4 CHRONIC PAIN SYNDROME: ICD-10-CM

## 2024-03-12 DIAGNOSIS — M51.36 DDD (DEGENERATIVE DISC DISEASE), LUMBAR: ICD-10-CM

## 2024-03-12 RX ORDER — HYDROCODONE BITARTRATE AND ACETAMINOPHEN 7.5; 325 MG/1; MG/1
1 TABLET ORAL EVERY 8 HOURS PRN
Qty: 135 TABLET | Refills: 0 | Status: SHIPPED | OUTPATIENT
Start: 2024-03-14 | End: 2024-04-28

## 2024-03-12 NOTE — TELEPHONE ENCOUNTER
----- Message from Teri Villanueva sent at 3/12/2024  9:50 AM EDT -----  Regarding: Refill Hydrocodone   Contact: [phone]  Please refill Hydrocodone to [facility] Thank You

## 2024-04-29 DIAGNOSIS — G89.4 CHRONIC PAIN SYNDROME: Primary | ICD-10-CM

## 2024-04-29 RX ORDER — HYDROCODONE BITARTRATE AND ACETAMINOPHEN 7.5; 325 MG/1; MG/1
1 TABLET ORAL EVERY 6 HOURS PRN
COMMUNITY
End: 2024-04-29 | Stop reason: SDUPTHER

## 2024-04-29 RX ORDER — HYDROCODONE BITARTRATE AND ACETAMINOPHEN 7.5; 325 MG/1; MG/1
1 TABLET ORAL EVERY 8 HOURS PRN
Qty: 90 TABLET | Refills: 0 | Status: SHIPPED | OUTPATIENT
Start: 2024-04-29 | End: 2024-05-29

## 2024-05-15 ENCOUNTER — OFFICE VISIT (OUTPATIENT)
Dept: FAMILY MEDICINE CLINIC | Age: 64
End: 2024-05-15

## 2024-05-15 VITALS
WEIGHT: 116 LBS | SYSTOLIC BLOOD PRESSURE: 102 MMHG | HEART RATE: 82 BPM | DIASTOLIC BLOOD PRESSURE: 66 MMHG | HEIGHT: 61 IN | OXYGEN SATURATION: 98 % | BODY MASS INDEX: 21.9 KG/M2 | TEMPERATURE: 98.3 F

## 2024-05-15 DIAGNOSIS — M25.561 CHRONIC PAIN OF RIGHT KNEE: ICD-10-CM

## 2024-05-15 DIAGNOSIS — M51.36 DDD (DEGENERATIVE DISC DISEASE), LUMBAR: ICD-10-CM

## 2024-05-15 DIAGNOSIS — R06.02 SHORTNESS OF BREATH: Primary | ICD-10-CM

## 2024-05-15 DIAGNOSIS — G89.29 CHRONIC PAIN OF RIGHT KNEE: ICD-10-CM

## 2024-05-15 DIAGNOSIS — I65.23 BILATERAL CAROTID ARTERY STENOSIS: ICD-10-CM

## 2024-05-15 DIAGNOSIS — C43.71 MALIGNANT MELANOMA OF RIGHT LOWER EXTREMITY INCLUDING HIP (HCC): ICD-10-CM

## 2024-05-15 DIAGNOSIS — G89.29 CHRONIC RIGHT-SIDED LOW BACK PAIN WITH RIGHT-SIDED SCIATICA: ICD-10-CM

## 2024-05-15 DIAGNOSIS — F33.40 RECURRENT MAJOR DEPRESSIVE DISORDER, IN REMISSION (HCC): ICD-10-CM

## 2024-05-15 DIAGNOSIS — M54.41 CHRONIC RIGHT-SIDED LOW BACK PAIN WITH RIGHT-SIDED SCIATICA: ICD-10-CM

## 2024-05-15 RX ORDER — REGADENOSON 0.08 MG/ML
0.4 INJECTION, SOLUTION INTRAVENOUS
OUTPATIENT
Start: 2024-05-15

## 2024-05-15 SDOH — ECONOMIC STABILITY: FOOD INSECURITY: WITHIN THE PAST 12 MONTHS, YOU WORRIED THAT YOUR FOOD WOULD RUN OUT BEFORE YOU GOT MONEY TO BUY MORE.: NEVER TRUE

## 2024-05-15 SDOH — ECONOMIC STABILITY: INCOME INSECURITY: HOW HARD IS IT FOR YOU TO PAY FOR THE VERY BASICS LIKE FOOD, HOUSING, MEDICAL CARE, AND HEATING?: NOT HARD AT ALL

## 2024-05-15 SDOH — ECONOMIC STABILITY: FOOD INSECURITY: WITHIN THE PAST 12 MONTHS, THE FOOD YOU BOUGHT JUST DIDN'T LAST AND YOU DIDN'T HAVE MONEY TO GET MORE.: NEVER TRUE

## 2024-05-15 NOTE — PROGRESS NOTES
SUBJECTIVE:  Teri Villanueva is a 63 y.o. female being evaluated for:    Chief Complaint   Patient presents with    Medication Check     Pt here today for 3 month Pain med check        HPI   Pain in low back and down her right leg  leg is feeling wobbly on the right   Right knee is killing her   Sob with exertion cough am smokers cough Inhaler does help her breathing on trilegy CT screening for next naomi later this month   70 % stenosis left carotid doppler   Chronic constipation and tried opiod induced med and was over 400$  dealing with senkot   Allergies   Allergen Reactions    Avelox [Moxifloxacin] Swelling and Rash    Sulfa Antibiotics Swelling and Rash    Chantix [Varenicline] Other (See Comments)     irritability and worsening depression     Codeine Nausea Only     Current Outpatient Medications   Medication Sig Dispense Refill    HYDROcodone-acetaminophen (NORCO) 7.5-325 MG per tablet Take 1 tablet by mouth every 8 hours as needed for Pain for up to 30 days. Max Daily Amount: 3 tablets 90 tablet 0    traZODone (DESYREL) 50 MG tablet TAKE 1 TO 2 TABLETS BY MOUTH AT BEDTIME AS NEEDED FOR SLEEP 180 tablet 0    escitalopram (LEXAPRO) 20 MG tablet TAKE 1 TABLET BY MOUTH EVERY DAY 90 tablet 1    cyclobenzaprine (FLEXERIL) 10 MG tablet TAKE 1 TABLET BY MOUTH THREE TIMES A DAY AS NEEDED FOR MUSCLE SPASM 90 tablet 3    fluticasone-umeclidin-vilant (TRELEGY ELLIPTA) 100-62.5-25 MCG/ACT AEPB inhaler Inhale 1 puff into the lungs daily 3 each 3    albuterol sulfate HFA (PROVENTIL HFA) 108 (90 Base) MCG/ACT inhaler Inhale 2 puffs into the lungs every 6 hours as needed for Wheezing or Shortness of Breath 1 each 3    [facility] ASPIRIN LOW DOSE 81 MG EC tablet TAKE 1 TABLET BY MOUTH EVERY DAY 30 tablet 5     No current facility-administered medications for this visit.         Social History     Socioeconomic History    Marital status:      Spouse name: Not on file    Number of children: Not on file    Years of education:

## 2024-05-20 ENCOUNTER — HOSPITAL ENCOUNTER (OUTPATIENT)
Dept: CT IMAGING | Age: 64
Discharge: HOME | End: 2024-05-20
Attending: INTERNAL MEDICINE
Payer: COMMERCIAL

## 2024-05-20 DIAGNOSIS — Z87.891 PERSONAL HISTORY OF TOBACCO USE: ICD-10-CM

## 2024-05-20 PROCEDURE — 71271 CT THORAX LUNG CANCER SCR C-: CPT

## 2024-05-21 ENCOUNTER — PATIENT MESSAGE (OUTPATIENT)
Dept: FAMILY MEDICINE CLINIC | Age: 64
End: 2024-05-21

## 2024-05-21 DIAGNOSIS — R91.1 PULMONARY NODULE: Primary | ICD-10-CM

## 2024-05-21 DIAGNOSIS — R91.1 PULMONARY NODULE SEEN ON IMAGING STUDY: ICD-10-CM

## 2024-05-21 DIAGNOSIS — R91.1 PULMONARY NODULE 1 CM OR GREATER IN DIAMETER: Primary | ICD-10-CM

## 2024-05-21 NOTE — TELEPHONE ENCOUNTER
From: Teri Villanueva  To: Dr. Edie Jewell  Sent: 5/21/2024 9:32 AM EDT  Subject: Test Results     You left me a message to return your call that’s a joke I’ve called several times on break with no answer can you give me a direct number to your office so I can get my results

## 2024-05-24 DIAGNOSIS — G47.00 INSOMNIA, UNSPECIFIED TYPE: ICD-10-CM

## 2024-05-24 RX ORDER — TRAZODONE HYDROCHLORIDE 50 MG/1
TABLET ORAL
Qty: 180 TABLET | Refills: 0 | Status: SHIPPED | OUTPATIENT
Start: 2024-05-24

## 2024-05-28 ENCOUNTER — TELEPHONE (OUTPATIENT)
Dept: PULMONOLOGY | Age: 64
End: 2024-05-28

## 2024-05-29 DIAGNOSIS — G89.4 CHRONIC PAIN SYNDROME: ICD-10-CM

## 2024-05-29 RX ORDER — HYDROCODONE BITARTRATE AND ACETAMINOPHEN 7.5; 325 MG/1; MG/1
1 TABLET ORAL EVERY 8 HOURS PRN
Qty: 90 TABLET | Refills: 0 | Status: SHIPPED | OUTPATIENT
Start: 2024-05-29 | End: 2024-06-28

## 2024-05-29 NOTE — TELEPHONE ENCOUNTER
----- Message from Teri Villanueva sent at 5/29/2024 10:38 AM EDT -----  Regarding: Hydrocodone   Contact: [phone]  Need refill for hydrocodone sent to [facility]

## 2024-05-31 ENCOUNTER — OFFICE VISIT (OUTPATIENT)
Dept: PULMONOLOGY | Age: 64
End: 2024-05-31
Payer: COMMERCIAL

## 2024-05-31 VITALS
DIASTOLIC BLOOD PRESSURE: 88 MMHG | WEIGHT: 113.4 LBS | RESPIRATION RATE: 18 BRPM | HEIGHT: 60 IN | TEMPERATURE: 97.6 F | BODY MASS INDEX: 22.26 KG/M2 | OXYGEN SATURATION: 95 % | SYSTOLIC BLOOD PRESSURE: 114 MMHG | HEART RATE: 99 BPM

## 2024-05-31 DIAGNOSIS — J43.2 CENTRILOBULAR EMPHYSEMA (HCC): Primary | ICD-10-CM

## 2024-05-31 DIAGNOSIS — R91.1 PULMONARY NODULE SEEN ON IMAGING STUDY: ICD-10-CM

## 2024-05-31 DIAGNOSIS — F17.200 SMOKER: ICD-10-CM

## 2024-05-31 PROCEDURE — 3074F SYST BP LT 130 MM HG: CPT | Performed by: INTERNAL MEDICINE

## 2024-05-31 PROCEDURE — 99204 OFFICE O/P NEW MOD 45 MIN: CPT | Performed by: INTERNAL MEDICINE

## 2024-05-31 PROCEDURE — 3079F DIAST BP 80-89 MM HG: CPT | Performed by: INTERNAL MEDICINE

## 2024-05-31 RX ORDER — IPRATROPIUM BROMIDE AND ALBUTEROL SULFATE 2.5; .5 MG/3ML; MG/3ML
1 SOLUTION RESPIRATORY (INHALATION) EVERY 4 HOURS
Qty: 360 ML | Refills: 11 | Status: SHIPPED | OUTPATIENT
Start: 2024-05-31

## 2024-05-31 RX ORDER — NEBULIZER ACCESSORIES
1 KIT MISCELLANEOUS DAILY
Qty: 1 KIT | Refills: 0 | Status: SHIPPED | OUTPATIENT
Start: 2024-05-31

## 2024-05-31 NOTE — PROGRESS NOTES
R91.1-Solitary pulmonary nodule-ICD-10-CM  Z85.820-Personal history of malignant melanoma of skin-ICD-10-CM.    COMPARISON:  January 9, 2020.    PROCEDURE COMMENTS: Multi-detector CT of the chest with multiplanar  reconstructions per protocol.  No contrast given.  Automatic exposure  control  utilized for dose reduction.    FINDINGS:  There is no recurrence of the diminished 1 mm nodule in the  right  upper lobe. There are no worrisome lung nodules identified. There is  lingular  atelectasis present versus rounded atelectasis versus nodule, maximally  measuring 10 x 18 mm. This area of density is more pronounced than the  January 2020 study, and shows progressive increased conspicuity when comparing to  October 7, 2019 but was also present on August 8, 2017.    The tracheobronchial tree is patent. No hilar or mediastinal adenopathy.  No  pleural effusion. Upper abdomen shows a probable calculus of the right  kidney, 1  or 2 mm size.    Coronary artery calcification: Mild.    IMPRESSION:  1. No recurrent right upper lobe nodule.  2. Increased density in the inferior lateral tip of the lingula, most  consistent  with rounded atelectasis or conventional atelectasis. This area showed  less  extensive but similar findings in 2020 exam but progression from 2019, but  the  area was also more conspicuous and abnormal in 2017. Benign etiology is  favored  given the 4 year history of waxing and waning densities in this area.  3. No new lung nodules.      -    Note: Radiology results need to be interpreted within a comprehensive  clinical  context.  If you have questions about the radiology report, please contact  the  office of the ordering clinician.      CTPA: No results found for this or any previous visit.      CXR PA/LAT: No results found for this or any previous visit.      CXR portable: No results found for this or any previous visit.          Total labs reviewed 3+    Category 2 Data points:    Radiology Review:

## 2024-05-31 NOTE — ASSESSMENT & PLAN NOTE
Currently Trelegy with increased shortness of breath.  With cough and some mucus plugging noted on CT, will start nebulizer via full facemask to help with sinus congestion and also chest congestion.

## 2024-05-31 NOTE — ASSESSMENT & PLAN NOTE
Multiple pulmonary nodules with the largest in the left upper lobe.  This is likely secondary to her viral infection.  Reassurance was given.  Recommended follow-up CT with her annual lung cancer screening CAT scan.

## 2024-05-31 NOTE — ASSESSMENT & PLAN NOTE
Continues to smoke.  Peak of 1 pack/day.  Currently at half pack per day.  Plans on weaning off.  Discussed other methods of smoking cessation

## 2024-06-17 DIAGNOSIS — M50.90 CERVICAL DISC DISEASE: ICD-10-CM

## 2024-06-17 DIAGNOSIS — M51.16 LUMBAR DISC DISEASE WITH RADICULOPATHY: ICD-10-CM

## 2024-06-17 RX ORDER — CYCLOBENZAPRINE HCL 10 MG
TABLET ORAL
Qty: 90 TABLET | Refills: 0 | Status: SHIPPED | OUTPATIENT
Start: 2024-06-17

## 2024-06-20 RX ORDER — ESCITALOPRAM OXALATE 20 MG/1
20 TABLET ORAL DAILY
Qty: 90 TABLET | Refills: 1 | Status: SHIPPED | OUTPATIENT
Start: 2024-06-20

## 2024-07-01 ENCOUNTER — TELEPHONE (OUTPATIENT)
Dept: FAMILY MEDICINE CLINIC | Age: 64
End: 2024-07-01

## 2024-07-01 DIAGNOSIS — G89.4 CHRONIC PAIN SYNDROME: ICD-10-CM

## 2024-07-01 RX ORDER — HYDROCODONE BITARTRATE AND ACETAMINOPHEN 10; 325 MG/1; MG/1
1 TABLET ORAL 3 TIMES DAILY PRN
Qty: 90 TABLET | Refills: 0 | Status: SHIPPED | OUTPATIENT
Start: 2024-07-01 | End: 2024-07-31

## 2024-07-01 NOTE — TELEPHONE ENCOUNTER
----- Message from Teri Villanueva sent at 7/1/2024 10:26 AM EDT -----  Regarding: Hydrocodone   Contact: [phone]  Need refill hydrocodone please

## 2024-07-31 DIAGNOSIS — G89.4 CHRONIC PAIN SYNDROME: ICD-10-CM

## 2024-07-31 RX ORDER — HYDROCODONE BITARTRATE AND ACETAMINOPHEN 10; 325 MG/1; MG/1
1 TABLET ORAL 3 TIMES DAILY PRN
Qty: 90 TABLET | Refills: 0 | Status: SHIPPED | OUTPATIENT
Start: 2024-07-31 | End: 2024-08-30

## 2024-07-31 NOTE — TELEPHONE ENCOUNTER
----- Message from Teri Villanueva sent at 7/31/2024  1:38 PM EDT -----  Regarding: Hydrocodone   Contact: [phone]  Need refill for hydrocodone please Thank You

## 2024-08-01 DIAGNOSIS — G89.4 CHRONIC PAIN SYNDROME: ICD-10-CM

## 2024-08-01 DIAGNOSIS — J44.1 CHRONIC OBSTRUCTIVE PULMONARY DISEASE WITH ACUTE EXACERBATION (HCC): ICD-10-CM

## 2024-08-01 RX ORDER — FLUTICASONE FUROATE, UMECLIDINIUM BROMIDE AND VILANTEROL TRIFENATATE 100; 62.5; 25 UG/1; UG/1; UG/1
1 POWDER RESPIRATORY (INHALATION) DAILY
Qty: 3 EACH | Refills: 3 | Status: SHIPPED | OUTPATIENT
Start: 2024-08-01

## 2024-08-01 RX ORDER — HYDROCODONE BITARTRATE AND ACETAMINOPHEN 10; 325 MG/1; MG/1
1 TABLET ORAL 3 TIMES DAILY PRN
Qty: 90 TABLET | Refills: 0 | OUTPATIENT
Start: 2024-08-01 | End: 2024-08-31

## 2024-08-19 ENCOUNTER — OFFICE VISIT (OUTPATIENT)
Dept: FAMILY MEDICINE CLINIC | Age: 64
End: 2024-08-19
Payer: COMMERCIAL

## 2024-08-19 VITALS
HEIGHT: 60 IN | BODY MASS INDEX: 22.54 KG/M2 | SYSTOLIC BLOOD PRESSURE: 118 MMHG | WEIGHT: 114.8 LBS | HEART RATE: 88 BPM | OXYGEN SATURATION: 96 % | TEMPERATURE: 97.7 F | DIASTOLIC BLOOD PRESSURE: 70 MMHG

## 2024-08-19 DIAGNOSIS — M51.36 DDD (DEGENERATIVE DISC DISEASE), LUMBAR: Primary | ICD-10-CM

## 2024-08-19 DIAGNOSIS — R05.3 CHRONIC COUGH: ICD-10-CM

## 2024-08-19 DIAGNOSIS — F33.40 RECURRENT MAJOR DEPRESSIVE DISORDER, IN REMISSION (HCC): ICD-10-CM

## 2024-08-19 DIAGNOSIS — R91.1 PULMONARY NODULE: ICD-10-CM

## 2024-08-19 DIAGNOSIS — I10 ESSENTIAL HYPERTENSION: ICD-10-CM

## 2024-08-19 DIAGNOSIS — J44.1 CHRONIC OBSTRUCTIVE PULMONARY DISEASE WITH ACUTE EXACERBATION (HCC): ICD-10-CM

## 2024-08-19 DIAGNOSIS — I77.1 SUBCLAVIAN ARTERY STENOSIS (HCC): ICD-10-CM

## 2024-08-19 PROCEDURE — 99214 OFFICE O/P EST MOD 30 MIN: CPT | Performed by: INTERNAL MEDICINE

## 2024-08-19 PROCEDURE — 3074F SYST BP LT 130 MM HG: CPT | Performed by: INTERNAL MEDICINE

## 2024-08-19 PROCEDURE — 3078F DIAST BP <80 MM HG: CPT | Performed by: INTERNAL MEDICINE

## 2024-08-19 RX ORDER — DOXYCYCLINE HYCLATE 100 MG
100 TABLET ORAL 2 TIMES DAILY
Qty: 20 TABLET | Refills: 0 | Status: SHIPPED | OUTPATIENT
Start: 2024-08-19 | End: 2024-08-29

## 2024-08-19 RX ORDER — PREDNISONE 20 MG/1
20 TABLET ORAL DAILY
Qty: 10 TABLET | Refills: 0 | Status: SHIPPED | OUTPATIENT
Start: 2024-08-19 | End: 2024-08-29

## 2024-08-19 NOTE — PROGRESS NOTES
SUBJECTIVE:  Teri Villanueva is a 63 y.o. female being evaluated for:    Chief Complaint   Patient presents with    Medication Management     Refill on pain medication       HPI   Seeing Dr Hunter for her knee put on a brace  Nothing else done  Given exercises  Is retiring from [employer] next month as can no longer do it  Pain across low back and down right leg more often   Happens coby if sitting for a while like asleep No trauma  Chronic high dose Vicodin helps Injections in back a long time ago and it did help then Last mri 2 years ago with scoliosis and neural formain narrowing right L4/5 and Left L5/S1  Wanting to see if gets better with reitiement    Chronic cough  Coughing up yellow sputum  worse in the am and at bed time   Using trelegy in the am and at night using albuterol     Depression is doing fine     Frontal headaches Not blowing nose or anything   vision is fine  Chronic neck pain No light or sound sensitivity   no n/vomiting with it does not feel like her migraines in the past     Allergies   Allergen Reactions    Avelox [Moxifloxacin] Swelling and Rash    Sulfa Antibiotics Swelling and Rash    Chantix [Varenicline] Other (See Comments)     irritability and worsening depression     Codeine Nausea Only     Current Outpatient Medications   Medication Sig Dispense Refill    predniSONE (DELTASONE) 20 MG tablet Take 1 tablet by mouth daily for 10 days 10 tablet 0    doxycycline hyclate (VIBRA-TABS) 100 MG tablet Take 1 tablet by mouth 2 times daily for 10 days 20 tablet 0    Budeson-Glycopyrrol-Formoterol 160-9-4.8 MCG/ACT AERO Inhale 2 puffs into the lungs in the morning and at bedtime Gargle and rinse after use 1 each 5    HYDROcodone-acetaminophen (NORCO)  MG per tablet Take 1 tablet by mouth 3 times daily as needed for Pain for up to 30 days. 90 tablet 0    escitalopram (LEXAPRO) 20 MG tablet TAKE 1 TABLET BY MOUTH EVERY DAY 90 tablet 1    cyclobenzaprine (FLEXERIL) 10 MG tablet TAKE 1 TABLET BY

## 2024-08-23 ASSESSMENT — ENCOUNTER SYMPTOMS
SHORTNESS OF BREATH: 1
BACK PAIN: 1
VOMITING: 0
CONSTIPATION: 1
NAUSEA: 0
COUGH: 1
WHEEZING: 1
RHINORRHEA: 0
ABDOMINAL PAIN: 0

## 2024-08-31 DIAGNOSIS — G47.00 INSOMNIA, UNSPECIFIED TYPE: ICD-10-CM

## 2024-09-03 ENCOUNTER — PATIENT MESSAGE (OUTPATIENT)
Dept: FAMILY MEDICINE CLINIC | Age: 64
End: 2024-09-03

## 2024-09-03 ENCOUNTER — TELEPHONE (OUTPATIENT)
Dept: FAMILY MEDICINE CLINIC | Age: 64
End: 2024-09-03

## 2024-09-03 DIAGNOSIS — G89.4 CHRONIC PAIN SYNDROME: ICD-10-CM

## 2024-09-03 RX ORDER — TRAZODONE HYDROCHLORIDE 50 MG/1
TABLET, FILM COATED ORAL
Qty: 180 TABLET | Refills: 0 | Status: SHIPPED | OUTPATIENT
Start: 2024-09-03

## 2024-09-03 RX ORDER — HYDROCODONE BITARTRATE AND ACETAMINOPHEN 10; 325 MG/1; MG/1
1 TABLET ORAL 3 TIMES DAILY PRN
Qty: 90 TABLET | Refills: 0 | OUTPATIENT
Start: 2024-09-03 | End: 2024-10-03

## 2024-09-03 RX ORDER — HYDROCODONE BITARTRATE AND ACETAMINOPHEN 10; 325 MG/1; MG/1
1 TABLET ORAL 3 TIMES DAILY PRN
Qty: 90 TABLET | Refills: 0 | Status: SHIPPED | OUTPATIENT
Start: 2024-09-03 | End: 2024-10-03

## 2024-09-03 NOTE — TELEPHONE ENCOUNTER
Need a refill on hydrocodone please send to [facility] Thank you      Last OV: 8-  Next OV: 11-

## 2024-10-07 DIAGNOSIS — G89.4 CHRONIC PAIN SYNDROME: ICD-10-CM

## 2024-10-07 RX ORDER — HYDROCODONE BITARTRATE AND ACETAMINOPHEN 10; 325 MG/1; MG/1
1 TABLET ORAL 3 TIMES DAILY PRN
Qty: 90 TABLET | Refills: 0 | Status: SHIPPED | OUTPATIENT
Start: 2024-10-07 | End: 2024-11-06

## 2024-10-07 NOTE — TELEPHONE ENCOUNTER
Teri PLAZA O Rich \"Stefany\"  P [facility] Staff (supporting Edie Jewell MD)Just now (9:41 AM)   Need a refill on my Hydrocodone to [facility] in [place]

## 2024-11-02 ENCOUNTER — TELEPHONE (OUTPATIENT)
Dept: CASE MANAGEMENT | Age: 64
End: 2024-11-02

## 2024-11-07 DIAGNOSIS — G89.4 CHRONIC PAIN SYNDROME: ICD-10-CM

## 2024-11-07 RX ORDER — HYDROCODONE BITARTRATE AND ACETAMINOPHEN 10; 325 MG/1; MG/1
1 TABLET ORAL 3 TIMES DAILY PRN
Qty: 90 TABLET | Refills: 0 | Status: SHIPPED | OUTPATIENT
Start: 2024-11-07 | End: 2024-12-07

## 2024-11-25 ENCOUNTER — OFFICE VISIT (OUTPATIENT)
Dept: FAMILY MEDICINE CLINIC | Age: 64
End: 2024-11-25

## 2024-11-25 VITALS
DIASTOLIC BLOOD PRESSURE: 76 MMHG | TEMPERATURE: 97.9 F | OXYGEN SATURATION: 98 % | BODY MASS INDEX: 22.65 KG/M2 | RESPIRATION RATE: 16 BRPM | SYSTOLIC BLOOD PRESSURE: 118 MMHG | WEIGHT: 116 LBS | HEART RATE: 88 BPM

## 2024-11-25 DIAGNOSIS — K59.09 CHRONIC CONSTIPATION: ICD-10-CM

## 2024-11-25 DIAGNOSIS — G89.4 CHRONIC PAIN SYNDROME: Primary | ICD-10-CM

## 2024-11-25 DIAGNOSIS — M51.362 DEGENERATION OF INTERVERTEBRAL DISC OF LUMBAR REGION WITH DISCOGENIC BACK PAIN AND LOWER EXTREMITY PAIN: ICD-10-CM

## 2024-11-25 DIAGNOSIS — J44.1 CHRONIC OBSTRUCTIVE PULMONARY DISEASE WITH ACUTE EXACERBATION (HCC): ICD-10-CM
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
## 2024-11-30 ASSESSMENT — ENCOUNTER SYMPTOMS
WHEEZING: 1
SHORTNESS OF BREATH: 0
NAUSEA: 0
BACK PAIN: 1
VOMITING: 0
CONSTIPATION: 1
ABDOMINAL PAIN: 0
COUGH: 1

## 2024-12-17 ENCOUNTER — PATIENT MESSAGE (OUTPATIENT)
Dept: FAMILY MEDICINE CLINIC | Age: 64
End: 2024-12-17

## 2024-12-17 DIAGNOSIS — G89.4 CHRONIC PAIN SYNDROME: ICD-10-CM

## 2024-12-17 RX ORDER — HYDROCODONE BITARTRATE AND ACETAMINOPHEN 10; 325 MG/1; MG/1
1 TABLET ORAL 3 TIMES DAILY PRN
Qty: 90 TABLET | Refills: 0 | Status: SHIPPED | OUTPATIENT
Start: 2024-12-17 | End: 2025-01-16

## 2024-12-26 ENCOUNTER — PATIENT MESSAGE (OUTPATIENT)
Dept: FAMILY MEDICINE CLINIC | Age: 64
End: 2024-12-26

## 2024-12-26 ENCOUNTER — TELEPHONE (OUTPATIENT)
Dept: FAMILY MEDICINE CLINIC | Age: 64
End: 2024-12-26

## 2024-12-26 RX ORDER — FLUTICASONE FUROATE, UMECLIDINIUM BROMIDE AND VILANTEROL TRIFENATATE 100; 62.5; 25 UG/1; UG/1; UG/1
1 POWDER RESPIRATORY (INHALATION) DAILY
Qty: 1 EACH | Refills: 5 | Status: SHIPPED | OUTPATIENT
Start: 2024-12-26

## 2024-12-26 RX ORDER — FLUTICASONE FUROATE, UMECLIDINIUM BROMIDE AND VILANTEROL TRIFENATATE 100; 62.5; 25 UG/1; UG/1; UG/1
1 POWDER RESPIRATORY (INHALATION) DAILY
Qty: 1 EACH | Refills: 5 | Status: SHIPPED | OUTPATIENT
Start: 2024-12-26 | End: 2024-12-26

## 2025-01-21 DIAGNOSIS — G89.4 CHRONIC PAIN SYNDROME: ICD-10-CM

## 2025-01-21 RX ORDER — HYDROCODONE BITARTRATE AND ACETAMINOPHEN 10; 325 MG/1; MG/1
1 TABLET ORAL 3 TIMES DAILY PRN
Qty: 90 TABLET | Refills: 0 | Status: SHIPPED | OUTPATIENT
Start: 2025-01-21 | End: 2025-02-20

## 2025-01-21 NOTE — TELEPHONE ENCOUNTER
I need my refill for Hydrocodone sent to [facility] in [place]     Last OV: 11- acute care   Next OV: 3-4-2025

## 2025-01-31 ENCOUNTER — TELEPHONE (OUTPATIENT)
Dept: FAMILY MEDICINE CLINIC | Age: 65
End: 2025-01-31

## 2025-02-24 DIAGNOSIS — G89.4 CHRONIC PAIN SYNDROME: ICD-10-CM

## 2025-02-24 RX ORDER — HYDROCODONE BITARTRATE AND ACETAMINOPHEN 10; 325 MG/1; MG/1
1 TABLET ORAL 3 TIMES DAILY PRN
Qty: 90 TABLET | Refills: 0 | Status: SHIPPED | OUTPATIENT
Start: 2025-02-24 | End: 2025-03-26

## 2025-02-24 NOTE — TELEPHONE ENCOUNTER
I need my refill on Hydrocodone sent to [facility] in [place]     Last OV: 11- acute care   Next OV: 3-4-2025

## 2025-03-05 ENCOUNTER — OFFICE VISIT (OUTPATIENT)
Dept: FAMILY MEDICINE CLINIC | Age: 65
End: 2025-03-05

## 2025-03-05 VITALS
DIASTOLIC BLOOD PRESSURE: 78 MMHG | OXYGEN SATURATION: 96 % | SYSTOLIC BLOOD PRESSURE: 126 MMHG | BODY MASS INDEX: 21.52 KG/M2 | TEMPERATURE: 98.2 F | HEART RATE: 94 BPM | WEIGHT: 114 LBS | RESPIRATION RATE: 16 BRPM | HEIGHT: 61 IN

## 2025-03-05 DIAGNOSIS — J44.9 CHRONIC OBSTRUCTIVE PULMONARY DISEASE, UNSPECIFIED COPD TYPE (HCC): ICD-10-CM

## 2025-03-05 DIAGNOSIS — R31.9 HEMATURIA, UNSPECIFIED TYPE: ICD-10-CM

## 2025-03-05 DIAGNOSIS — R35.0 URINARY FREQUENCY: Primary | ICD-10-CM

## 2025-03-05 DIAGNOSIS — F11.20 OPIOID DEPENDENCE WITH CURRENT USE (HCC): ICD-10-CM

## 2025-03-05 DIAGNOSIS — C43.71 MALIGNANT MELANOMA OF RIGHT LOWER EXTREMITY INCLUDING HIP (HCC): ICD-10-CM

## 2025-03-05 DIAGNOSIS — Z51.81 MEDICATION MONITORING ENCOUNTER: ICD-10-CM

## 2025-03-05 LAB
BILIRUBIN, POC: NORMAL
BLOOD URINE, POC: NORMAL
CLARITY, POC: CLEAR
COLOR, POC: NORMAL
GLUCOSE URINE, POC: NORMAL MG/DL
KETONES, POC: NORMAL MG/DL
LEUKOCYTE EST, POC: NORMAL
NITRITE, POC: NORMAL
PH, POC: 7
PROTEIN, POC: NORMAL MG/DL
SPECIFIC GRAVITY, POC: 1.01
UROBILINOGEN, POC: 0.2 MG/DL

## 2025-03-05 SDOH — ECONOMIC STABILITY: FOOD INSECURITY: WITHIN THE PAST 12 MONTHS, YOU WORRIED THAT YOUR FOOD WOULD RUN OUT BEFORE YOU GOT MONEY TO BUY MORE.: NEVER TRUE

## 2025-03-05 SDOH — ECONOMIC STABILITY: FOOD INSECURITY: WITHIN THE PAST 12 MONTHS, THE FOOD YOU BOUGHT JUST DIDN'T LAST AND YOU DIDN'T HAVE MONEY TO GET MORE.: NEVER TRUE

## 2025-03-05 ASSESSMENT — PATIENT HEALTH QUESTIONNAIRE - PHQ9
1. LITTLE INTEREST OR PLEASURE IN DOING THINGS: NOT AT ALL
3. TROUBLE FALLING OR STAYING ASLEEP: NOT AT ALL
SUM OF ALL RESPONSES TO PHQ QUESTIONS 1-9: 0
6. FEELING BAD ABOUT YOURSELF - OR THAT YOU ARE A FAILURE OR HAVE LET YOURSELF OR YOUR FAMILY DOWN: NOT AT ALL
9. THOUGHTS THAT YOU WOULD BE BETTER OFF DEAD, OR OF HURTING YOURSELF: NOT AT ALL
7. TROUBLE CONCENTRATING ON THINGS, SUCH AS READING THE NEWSPAPER OR WATCHING TELEVISION: NOT AT ALL
10. IF YOU CHECKED OFF ANY PROBLEMS, HOW DIFFICULT HAVE THESE PROBLEMS MADE IT FOR YOU TO DO YOUR WORK, TAKE CARE OF THINGS AT HOME, OR GET ALONG WITH OTHER PEOPLE: NOT DIFFICULT AT ALL
SUM OF ALL RESPONSES TO PHQ QUESTIONS 1-9: 0
8. MOVING OR SPEAKING SO SLOWLY THAT OTHER PEOPLE COULD HAVE NOTICED. OR THE OPPOSITE, BEING SO FIGETY OR RESTLESS THAT YOU HAVE BEEN MOVING AROUND A LOT MORE THAN USUAL: NOT AT ALL
5. POOR APPETITE OR OVEREATING: NOT AT ALL
2. FEELING DOWN, DEPRESSED OR HOPELESS: NOT AT ALL
4. FEELING TIRED OR HAVING LITTLE ENERGY: NOT AT ALL
SUM OF ALL RESPONSES TO PHQ QUESTIONS 1-9: 0
SUM OF ALL RESPONSES TO PHQ QUESTIONS 1-9: 0

## 2025-03-05 NOTE — PROGRESS NOTES
education: Not on file    Highest education level: Not on file   Occupational History    Not on file   Tobacco Use    Smoking status: Every Day     Current packs/day: 0.75     Average packs/day: 0.8 packs/day for 40.2 years (30.1 ttl pk-yrs)     Types: Cigarettes     Start date: 1/1/1985     Passive exposure: Current    Smokeless tobacco: Never   Vaping Use    Vaping status: Never Used   Substance and Sexual Activity    Alcohol use: No    Drug use: Never    Sexual activity: Yes     Partners: Male   Other Topics Concern    Not on file   Social History Narrative    Not on file     Social Drivers of Health     Financial Resource Strain: Low Risk  (5/15/2024)    Overall Financial Resource Strain (CARDIA)     Difficulty of Paying Living Expenses: Not hard at all   Food Insecurity: No Food Insecurity (3/5/2025)    Hunger Vital Sign     Worried About Running Out of Food in the Last Year: Never true     Ran Out of Food in the Last Year: Never true   Transportation Needs: No Transportation Needs (3/5/2025)    PRAPARE - Transportation     Lack of Transportation (Medical): No     Lack of Transportation (Non-Medical): No   Physical Activity: Not on file   Stress: Not on file   Social Connections: Not on file   Intimate Partner Violence: Unknown (1/22/2024)    [facility]    Interpersonal Safety     Feel physically or emotionally unsafe where currently live: Not on file     Harm by anyone: Not on file     Emotionally Harmed: Not on file   Housing Stability: Low Risk  (3/5/2025)    Housing Stability Vital Sign     Unable to Pay for Housing in the Last Year: No     Number of Times Moved in the Last Year: 0     Homeless in the Last Year: No       MEDICATIONS  Hydrocodone, Lexapro, albuterol, Trelegy, Movantik.     Current Outpatient Medications on File Prior to Visit   Medication Sig Dispense Refill    HYDROcodone-acetaminophen (NORCO)  MG

## 2025-03-06 LAB
AMPHETAMINES UR QL SCN>1000 NG/ML: NORMAL
BARBITURATES UR QL SCN>200 NG/ML: NORMAL
BENZODIAZ UR QL SCN>200 NG/ML: NORMAL
CANNABINOIDS UR QL SCN>50 NG/ML: NORMAL
COCAINE UR QL SCN: NORMAL
DRUG SCREEN COMMENT UR-IMP: NORMAL
FENTANYL SCREEN, URINE: NORMAL
METHADONE UR QL SCN>300 NG/ML: NORMAL
OPIATES UR QL SCN>300 NG/ML: NORMAL
OXYCODONE UR QL SCN: NORMAL
PCP UR QL SCN>25 NG/ML: NORMAL
PH UR STRIP: 7 [PH]

## 2025-03-10 ENCOUNTER — RESULTS FOLLOW-UP (OUTPATIENT)
Dept: FAMILY MEDICINE CLINIC | Age: 65
End: 2025-03-10

## 2025-05-12 DIAGNOSIS — G47.00 INSOMNIA, UNSPECIFIED TYPE: ICD-10-CM

## 2025-05-12 RX ORDER — ESCITALOPRAM OXALATE 20 MG/1
20 TABLET ORAL DAILY
Qty: 90 TABLET | Refills: 0 | Status: SHIPPED | OUTPATIENT
Start: 2025-05-12

## 2025-05-12 RX ORDER — TRAZODONE HYDROCHLORIDE 50 MG/1
TABLET ORAL
Qty: 180 TABLET | Refills: 0 | Status: SHIPPED | OUTPATIENT
Start: 2025-05-12

## 2025-06-23 RX ORDER — FLUTICASONE FUROATE, UMECLIDINIUM BROMIDE AND VILANTEROL TRIFENATATE 100; 62.5; 25 UG/1; UG/1; UG/1
POWDER RESPIRATORY (INHALATION)
Qty: 60 EACH | Refills: 0 | Status: SHIPPED | OUTPATIENT
Start: 2025-06-23

## 2025-07-23 RX ORDER — FLUTICASONE FUROATE, UMECLIDINIUM BROMIDE AND VILANTEROL TRIFENATATE 100; 62.5; 25 UG/1; UG/1; UG/1
1 POWDER RESPIRATORY (INHALATION) DAILY
Qty: 60 EACH | Refills: 0 | Status: SHIPPED | OUTPATIENT
Start: 2025-07-23

## 2025-08-08 RX ORDER — ESCITALOPRAM OXALATE 20 MG/1
20 TABLET ORAL DAILY
Qty: 90 TABLET | Refills: 0 | Status: SHIPPED | OUTPATIENT
Start: 2025-08-08

## 2025-08-25 RX ORDER — FLUTICASONE FUROATE, UMECLIDINIUM BROMIDE AND VILANTEROL TRIFENATATE 100; 62.5; 25 UG/1; UG/1; UG/1
1 POWDER RESPIRATORY (INHALATION) DAILY
Qty: 60 EACH | Refills: 0 | Status: SHIPPED | OUTPATIENT
Start: 2025-08-25